# Patient Record
Sex: MALE | Race: WHITE | NOT HISPANIC OR LATINO | Employment: OTHER | URBAN - METROPOLITAN AREA
[De-identification: names, ages, dates, MRNs, and addresses within clinical notes are randomized per-mention and may not be internally consistent; named-entity substitution may affect disease eponyms.]

---

## 2018-01-17 ENCOUNTER — OFFICE VISIT (OUTPATIENT)
Dept: FAMILY MEDICINE CLINIC | Facility: CLINIC | Age: 83
End: 2018-01-17

## 2018-01-17 VITALS
RESPIRATION RATE: 14 BRPM | OXYGEN SATURATION: 99 % | HEART RATE: 78 BPM | TEMPERATURE: 98.3 F | WEIGHT: 168.2 LBS | SYSTOLIC BLOOD PRESSURE: 130 MMHG | BODY MASS INDEX: 25.49 KG/M2 | HEIGHT: 68 IN | DIASTOLIC BLOOD PRESSURE: 78 MMHG

## 2018-01-17 DIAGNOSIS — I48.21 PERMANENT ATRIAL FIBRILLATION (HCC): Primary | ICD-10-CM

## 2018-01-17 DIAGNOSIS — F51.01 PRIMARY INSOMNIA: ICD-10-CM

## 2018-01-17 DIAGNOSIS — I10 HYPERTENSION, ESSENTIAL: ICD-10-CM

## 2018-01-17 DIAGNOSIS — E78.01 FAMILIAL HYPERCHOLESTEROLEMIA: ICD-10-CM

## 2018-01-17 PROCEDURE — 99204 OFFICE O/P NEW MOD 45 MIN: CPT | Performed by: FAMILY MEDICINE

## 2018-01-17 RX ORDER — ATORVASTATIN CALCIUM 20 MG/1
20 TABLET, FILM COATED ORAL DAILY
COMMUNITY
End: 2018-05-17 | Stop reason: SDUPTHER

## 2018-01-17 RX ORDER — RAMIPRIL 10 MG/1
10 CAPSULE ORAL DAILY
COMMUNITY
End: 2018-03-02 | Stop reason: SDUPTHER

## 2018-01-17 RX ORDER — CELECOXIB 200 MG/1
200 CAPSULE ORAL DAILY
COMMUNITY
End: 2018-03-14 | Stop reason: SDUPTHER

## 2018-01-17 RX ORDER — ZOLPIDEM TARTRATE 5 MG/1
2.5 TABLET ORAL NIGHTLY PRN
COMMUNITY
End: 2018-05-17 | Stop reason: SDUPTHER

## 2018-03-02 RX ORDER — RAMIPRIL 10 MG/1
10 CAPSULE ORAL DAILY
Qty: 90 CAPSULE | Refills: 3 | Status: SHIPPED | OUTPATIENT
Start: 2018-03-02 | End: 2019-02-26 | Stop reason: SDUPTHER

## 2018-03-13 ENCOUNTER — TELEPHONE (OUTPATIENT)
Dept: FAMILY MEDICINE CLINIC | Facility: CLINIC | Age: 83
End: 2018-03-13

## 2018-03-13 NOTE — TELEPHONE ENCOUNTER
Patient needs a refill on Celecoxib 200mg take one cap by mouth daily sent to [facility] at [facility].

## 2018-03-14 RX ORDER — CELECOXIB 200 MG/1
200 CAPSULE ORAL DAILY
Qty: 90 CAPSULE | Refills: 3 | Status: SHIPPED | OUTPATIENT
Start: 2018-03-14 | End: 2019-03-07 | Stop reason: SDUPTHER

## 2018-05-16 NOTE — TELEPHONE ENCOUNTER
Patient needs a RX for Atorvastatin 40mg take one tablet by mouth daily sent to [facility] in [place]     Also patient needs a RX for Zolpidem 5g take one tablet by mouth at bedtime sent to [facility] pharmacy.

## 2018-05-17 RX ORDER — ATORVASTATIN CALCIUM 20 MG/1
20 TABLET, FILM COATED ORAL DAILY
Qty: 90 TABLET | Refills: 3 | Status: SHIPPED | OUTPATIENT
Start: 2018-05-17 | End: 2019-05-20 | Stop reason: SDUPTHER

## 2018-05-17 RX ORDER — ZOLPIDEM TARTRATE 5 MG/1
5 TABLET ORAL NIGHTLY PRN
Qty: 30 TABLET | Refills: 0 | OUTPATIENT
Start: 2018-05-17 | End: 2018-07-10 | Stop reason: SDUPTHER

## 2018-07-06 RX ORDER — ZOLPIDEM TARTRATE 5 MG/1
TABLET ORAL
Qty: 30 TABLET | Refills: 0 | OUTPATIENT
Start: 2018-07-06

## 2018-07-10 ENCOUNTER — OFFICE VISIT (OUTPATIENT)
Dept: FAMILY MEDICINE CLINIC | Facility: CLINIC | Age: 83
End: 2018-07-10

## 2018-07-10 VITALS
DIASTOLIC BLOOD PRESSURE: 70 MMHG | HEIGHT: 68 IN | HEART RATE: 87 BPM | BODY MASS INDEX: 25.76 KG/M2 | SYSTOLIC BLOOD PRESSURE: 122 MMHG | RESPIRATION RATE: 14 BRPM | WEIGHT: 170 LBS | TEMPERATURE: 98.3 F | OXYGEN SATURATION: 98 %

## 2018-07-10 DIAGNOSIS — R53.83 FATIGUE, UNSPECIFIED TYPE: ICD-10-CM

## 2018-07-10 DIAGNOSIS — F51.01 PRIMARY INSOMNIA: Primary | ICD-10-CM

## 2018-07-10 PROCEDURE — 99214 OFFICE O/P EST MOD 30 MIN: CPT | Performed by: FAMILY MEDICINE

## 2018-07-10 RX ORDER — ZOLPIDEM TARTRATE 5 MG/1
5 TABLET ORAL NIGHTLY PRN
Qty: 90 TABLET | Refills: 0 | Status: SHIPPED | OUTPATIENT
Start: 2018-07-10 | End: 2018-12-27 | Stop reason: SDUPTHER

## 2018-07-10 NOTE — PROGRESS NOTES
Jasson Gonzalez is a 83 y.o. male.     Chief Complaint   Patient presents with   • Insomnia     Patient needs a refill on ambien for insomnia.        HPI     Patient presents office today to follow-up on insomnia as well as to discuss problem that is new to me.  Patient states that he's been taking and tolerating his Ambien 5 mg tablets with no issues.  Tolerating well.  No adverse side effects noted.  Patient does report that over the last 6 months or so he's felt like he is becoming more and more fatigued with physical activity.  He denies any chest pain or shortness of breath.  No lightheadedness or dizziness.  Patient states when he stands for long periods of time he feels like he gets weak.  States that he gets weakness in his low back in his hips and he has to sit down.  No known injury.    The following portions of the patient's history were reviewed and updated as appropriate: allergies, current medications, past family history, past medical history, past social history, past surgical history and problem list.    Review of Systems   Psychiatric/Behavioral: Positive for sleep disturbance.   All other systems reviewed and are negative.      Objective  Vitals:    07/10/18 0912   BP: 122/70   Pulse: 87   Resp: 14   Temp: 98.3 °F (36.8 °C)   SpO2: 98%       Physical Exam   Constitutional: He is oriented to person, place, and time. He appears well-developed and well-nourished. No distress.   HENT:   Head: Normocephalic and atraumatic.   Right Ear: External ear normal.   Left Ear: External ear normal.   Nose: Nose normal.   Mouth/Throat: Oropharynx is clear and moist.   Eyes: Conjunctivae and EOM are normal. Pupils are equal, round, and reactive to light. Right eye exhibits no discharge. Left eye exhibits no discharge. No scleral icterus.   Cardiovascular: Normal rate, regular rhythm and normal heart sounds.    Pulmonary/Chest: Effort normal and breath sounds normal. No respiratory distress. He has no  wheezes. He has no rales.   Abdominal: Soft. Bowel sounds are normal. He exhibits no distension. There is no tenderness.   Neurological: He is alert and oriented to person, place, and time.   Skin: Skin is warm and dry. He is not diaphoretic.   Nursing note and vitals reviewed.        Current Outpatient Prescriptions:   •  apixaban (ELIQUIS) 5 MG tablet tablet, Take 1 tablet by mouth Every 12 (Twelve) Hours., Disp: 180 tablet, Rfl: 3  •  celecoxib (CeleBREX) 200 MG capsule, Take 1 capsule by mouth Daily., Disp: 90 capsule, Rfl: 3  •  ramipril (ALTACE) 10 MG capsule, Take 1 capsule by mouth Daily., Disp: 90 capsule, Rfl: 3  •  zolpidem (AMBIEN) 5 MG tablet, Take 1 tablet by mouth At Night As Needed for Sleep., Disp: 90 tablet, Rfl: 0  •  atorvastatin (LIPITOR) 20 MG tablet, Take 1 tablet by mouth Daily., Disp: 90 tablet, Rfl: 3    Procedures    Lab Results (most recent)     None              Jasson was seen today for insomnia.    Diagnoses and all orders for this visit:    Primary insomnia  -     zolpidem (AMBIEN) 5 MG tablet; Take 1 tablet by mouth At Night As Needed for Sleep.    Fatigue, unspecified type  -     CBC Auto Differential  -     Comprehensive Metabolic Panel  -     Thyroid Panel With TSH      3 month supply of Ambien given to the patient.  We'll get blood work as above to evaluate the underlying cause of the patient's fatigue.  Discussed the need to try and improve conditioning with physical exercise.  Patient voiced understanding and will in increase the amount of time he spends on a stationary bike.    Return in about 3 months (around 10/10/2018) for Recheck.      Benjamin Yuen MD

## 2018-07-11 LAB
ALBUMIN SERPL-MCNC: 4.5 G/DL (ref 3.5–5.2)
ALBUMIN/GLOB SERPL: 2.3 G/DL
ALP SERPL-CCNC: 70 U/L (ref 39–117)
ALT SERPL-CCNC: 24 U/L (ref 1–41)
AST SERPL-CCNC: 23 U/L (ref 1–40)
BASOPHILS # BLD AUTO: 0.03 10*3/MM3 (ref 0–0.2)
BASOPHILS NFR BLD AUTO: 0.4 % (ref 0–1.5)
BILIRUB SERPL-MCNC: 0.6 MG/DL (ref 0.1–1.2)
BUN SERPL-MCNC: 22 MG/DL (ref 8–23)
BUN/CREAT SERPL: 18.3 (ref 7–25)
CALCIUM SERPL-MCNC: 9.4 MG/DL (ref 8.6–10.5)
CHLORIDE SERPL-SCNC: 103 MMOL/L (ref 98–107)
CO2 SERPL-SCNC: 22.7 MMOL/L (ref 22–29)
CREAT SERPL-MCNC: 1.2 MG/DL (ref 0.76–1.27)
EOSINOPHIL # BLD AUTO: 0.3 10*3/MM3 (ref 0–0.7)
EOSINOPHIL NFR BLD AUTO: 3.7 % (ref 0.3–6.2)
ERYTHROCYTE [DISTWIDTH] IN BLOOD BY AUTOMATED COUNT: 13.6 % (ref 11.5–14.5)
FT4I SERPL CALC-MCNC: 1.9 (ref 1.2–4.9)
GLOBULIN SER CALC-MCNC: 2 GM/DL
GLUCOSE SERPL-MCNC: 119 MG/DL (ref 65–99)
HCT VFR BLD AUTO: 43.1 % (ref 40.4–52.2)
HGB BLD-MCNC: 14.4 G/DL (ref 13.7–17.6)
IMM GRANULOCYTES # BLD: 0.02 10*3/MM3 (ref 0–0.03)
IMM GRANULOCYTES NFR BLD: 0.2 % (ref 0–0.5)
LYMPHOCYTES # BLD AUTO: 1.32 10*3/MM3 (ref 0.9–4.8)
LYMPHOCYTES NFR BLD AUTO: 16.5 % (ref 19.6–45.3)
MCH RBC QN AUTO: 31.8 PG (ref 27–32.7)
MCHC RBC AUTO-ENTMCNC: 33.4 G/DL (ref 32.6–36.4)
MCV RBC AUTO: 95.1 FL (ref 79.8–96.2)
MONOCYTES # BLD AUTO: 0.65 10*3/MM3 (ref 0.2–1.2)
MONOCYTES NFR BLD AUTO: 8.1 % (ref 5–12)
NEUTROPHILS # BLD AUTO: 5.72 10*3/MM3 (ref 1.9–8.1)
NEUTROPHILS NFR BLD AUTO: 71.3 % (ref 42.7–76)
PLATELET # BLD AUTO: 191 10*3/MM3 (ref 140–500)
POTASSIUM SERPL-SCNC: 4.6 MMOL/L (ref 3.5–5.2)
PROT SERPL-MCNC: 6.5 G/DL (ref 6–8.5)
RBC # BLD AUTO: 4.53 10*6/MM3 (ref 4.6–6)
SODIUM SERPL-SCNC: 142 MMOL/L (ref 136–145)
T3RU NFR SERPL: 28 % (ref 24–39)
T4 SERPL-MCNC: 6.8 UG/DL (ref 4.5–12)
TSH SERPL DL<=0.005 MIU/L-ACNC: 3.2 UIU/ML (ref 0.45–4.5)
WBC # BLD AUTO: 8.02 10*3/MM3 (ref 4.5–10.7)

## 2018-12-21 DIAGNOSIS — F51.01 PRIMARY INSOMNIA: ICD-10-CM

## 2018-12-21 RX ORDER — ZOLPIDEM TARTRATE 5 MG/1
TABLET ORAL
Qty: 90 TABLET | Refills: 0 | OUTPATIENT
Start: 2018-12-21

## 2018-12-26 ENCOUNTER — TELEPHONE (OUTPATIENT)
Dept: FAMILY MEDICINE CLINIC | Facility: CLINIC | Age: 83
End: 2018-12-26

## 2018-12-26 DIAGNOSIS — F51.01 PRIMARY INSOMNIA: ICD-10-CM

## 2018-12-26 NOTE — TELEPHONE ENCOUNTER
Pt is requesting refill for zolpidem (AMBIEN) 5 MG He made appt for 1st available on 1/10  [facility]

## 2018-12-27 RX ORDER — ZOLPIDEM TARTRATE 5 MG/1
5 TABLET ORAL NIGHTLY PRN
Qty: 90 TABLET | Refills: 0 | Status: SHIPPED | OUTPATIENT
Start: 2018-12-27 | End: 2019-06-18 | Stop reason: SDUPTHER

## 2019-01-10 ENCOUNTER — OFFICE VISIT (OUTPATIENT)
Dept: FAMILY MEDICINE CLINIC | Facility: CLINIC | Age: 84
End: 2019-01-10

## 2019-01-10 VITALS
TEMPERATURE: 98.3 F | BODY MASS INDEX: 25.91 KG/M2 | WEIGHT: 171 LBS | SYSTOLIC BLOOD PRESSURE: 132 MMHG | DIASTOLIC BLOOD PRESSURE: 78 MMHG | HEART RATE: 78 BPM | RESPIRATION RATE: 14 BRPM | HEIGHT: 68 IN | OXYGEN SATURATION: 98 %

## 2019-01-10 DIAGNOSIS — R29.898 WEAKNESS OF BOTH LOWER EXTREMITIES: Primary | ICD-10-CM

## 2019-01-10 DIAGNOSIS — F51.01 PRIMARY INSOMNIA: ICD-10-CM

## 2019-01-10 PROCEDURE — 99214 OFFICE O/P EST MOD 30 MIN: CPT | Performed by: FAMILY MEDICINE

## 2019-01-10 NOTE — PROGRESS NOTES
Jasson Gonzalez is a 83 y.o. male.     Chief Complaint   Patient presents with   • Insomnia     Patient needs refill on ambien.   • Extremity Weakness     Patient is having hard time staying standing for more than 5-6 mints.       HPI     Patient presents to the office today to discuss a couple of issues.  Patient suffers from insomnia.  Currently taking and tolerating Ambien 5 mg nightly with no adverse side effects.  Patient ran out of this medication was given a refill contingent upon the visit today.  No adverse side effects noted.    Patient states that for the last 6-8 months he's noticed a steady decline in the strength that he has in his bilateral lower extremities.  He states that he's able to walk okay without issue but if he is asked to stand still he feels like his hips get tired and they aren't going to give out.  He just sits down.  States that her last about 5 or 6 minutes standing before he has to sit down.  No known injury.  No numbness or tingling.    The following portions of the patient's history were reviewed and updated as appropriate: allergies, current medications, past family history, past medical history, past social history, past surgical history and problem list.    Review of Systems   Musculoskeletal: Positive for gait problem and joint swelling.   All other systems reviewed and are negative.      Objective  Vitals:    01/10/19 1530   BP: 132/78   Pulse: 78   Resp: 14   Temp: 98.3 °F (36.8 °C)   SpO2: 98%       Physical Exam   Constitutional: He is oriented to person, place, and time. He appears well-developed and well-nourished. No distress.   HENT:   Head: Normocephalic and atraumatic.   Right Ear: External ear normal.   Left Ear: External ear normal.   Nose: Nose normal.   Mouth/Throat: Oropharynx is clear and moist.   Eyes: Conjunctivae and EOM are normal. Pupils are equal, round, and reactive to light. Right eye exhibits no discharge. Left eye exhibits no discharge. No  scleral icterus.   Cardiovascular: Normal rate, regular rhythm and normal heart sounds.   Pulmonary/Chest: Effort normal and breath sounds normal. No respiratory distress. He has no wheezes. He has no rales.   Abdominal: Soft. Bowel sounds are normal. He exhibits no distension. There is no tenderness.   Neurological: He is alert and oriented to person, place, and time.   Skin: Skin is warm and dry. He is not diaphoretic.   Nursing note and vitals reviewed.        Current Outpatient Medications:   •  apixaban (ELIQUIS) 5 MG tablet tablet, Take 1 tablet by mouth Every 12 (Twelve) Hours., Disp: 180 tablet, Rfl: 3  •  atorvastatin (LIPITOR) 20 MG tablet, Take 1 tablet by mouth Daily., Disp: 90 tablet, Rfl: 3  •  celecoxib (CeleBREX) 200 MG capsule, Take 1 capsule by mouth Daily., Disp: 90 capsule, Rfl: 3  •  ramipril (ALTACE) 10 MG capsule, Take 1 capsule by mouth Daily., Disp: 90 capsule, Rfl: 3  •  zolpidem (AMBIEN) 5 MG tablet, Take 1 tablet by mouth At Night As Needed for Sleep., Disp: 90 tablet, Rfl: 0  Current outpatient and discharge medications have been reconciled for the patient.  Reviewed by: Benjamin Yuen MD      Procedures    Lab Results (most recent)     None                  Jasson was seen today for insomnia and extremity weakness.    Diagnoses and all orders for this visit:    Weakness of both lower extremities    Primary insomnia    Refill given previously for Ambien 5 mg.  Discussed the continued use as well as the potential adverse side effects.    Physical exam somewhat reassuring of bilateral lower extremities.  We'll try physical conditioning with a stationary bike that he has at his home 10 minutes a day for one month.  If no improvement will consider physical therapy or further investigation through imaging.      Return in about 4 weeks (around 2/7/2019) for Recheck.      Benjamin Yuen MD

## 2019-01-19 DIAGNOSIS — I48.21 PERMANENT ATRIAL FIBRILLATION (HCC): ICD-10-CM

## 2019-01-21 RX ORDER — APIXABAN 5 MG/1
TABLET, FILM COATED ORAL
Qty: 180 TABLET | Refills: 2 | Status: SHIPPED | OUTPATIENT
Start: 2019-01-21 | End: 2019-11-11 | Stop reason: SDUPTHER

## 2019-02-26 RX ORDER — RAMIPRIL 10 MG/1
CAPSULE ORAL
Qty: 90 CAPSULE | Refills: 2 | Status: SHIPPED | OUTPATIENT
Start: 2019-02-26 | End: 2019-12-10 | Stop reason: SDUPTHER

## 2019-03-07 RX ORDER — CELECOXIB 200 MG/1
CAPSULE ORAL
Qty: 90 CAPSULE | Refills: 2 | Status: SHIPPED | OUTPATIENT
Start: 2019-03-07 | End: 2019-12-12 | Stop reason: SDUPTHER

## 2019-04-25 ENCOUNTER — OFFICE VISIT (OUTPATIENT)
Dept: FAMILY MEDICINE CLINIC | Facility: CLINIC | Age: 84
End: 2019-04-25

## 2019-04-25 VITALS
WEIGHT: 164 LBS | HEIGHT: 68 IN | OXYGEN SATURATION: 98 % | TEMPERATURE: 98.8 F | HEART RATE: 75 BPM | RESPIRATION RATE: 14 BRPM | DIASTOLIC BLOOD PRESSURE: 66 MMHG | SYSTOLIC BLOOD PRESSURE: 128 MMHG | BODY MASS INDEX: 24.86 KG/M2

## 2019-04-25 DIAGNOSIS — T14.8XXA OPEN WOUND: ICD-10-CM

## 2019-04-25 DIAGNOSIS — L03.114 CELLULITIS OF LEFT UPPER EXTREMITY: Primary | ICD-10-CM

## 2019-04-25 PROCEDURE — 99213 OFFICE O/P EST LOW 20 MIN: CPT | Performed by: NURSE PRACTITIONER

## 2019-04-25 RX ORDER — CEPHALEXIN 500 MG/1
500 CAPSULE ORAL 3 TIMES DAILY
Qty: 21 CAPSULE | Refills: 0 | Status: SHIPPED | OUTPATIENT
Start: 2019-04-25 | End: 2019-09-19

## 2019-04-25 NOTE — PROGRESS NOTES
"Subjective   Jasson Gonzalez is a 84 y.o. male.     History of Present Illness   Patient presented to the office today with concerns over a wound on his left elbow from working on his tractor over the weekend.  The area is red and swollen he has not been using any medication on it.  The following portions of the patient's history were reviewed and updated as appropriate: allergies, current medications, past social history and problem list.    Review of Systems   Skin: Positive for wound.   All other systems reviewed and are negative.      Objective   /66   Pulse 75   Temp 98.8 °F (37.1 °C) (Oral)   Resp 14   Ht 172.7 cm (68\")   Wt 74.4 kg (164 lb)   SpO2 98%   BMI 24.94 kg/m²   Physical Exam   Constitutional: He is oriented to person, place, and time. Vital signs are normal. He appears well-developed and well-nourished. No distress.   HENT:   Head: Normocephalic.   Cardiovascular: Normal rate, regular rhythm and normal heart sounds.   Pulmonary/Chest: Effort normal and breath sounds normal.   Neurological: He is alert and oriented to person, place, and time. Gait normal.   Skin:   Left elbow swollen with a bursitis-like fluid there is an open wound on the very tip of his elbow with slight scabbing area is red and warm to the touch   Psychiatric: He has a normal mood and affect. His behavior is normal. Judgment and thought content normal.   Vitals reviewed.      Assessment/Plan      Diagnosis Plan   1. Cellulitis of left upper extremity  cephalexin (KEFLEX) 500 MG capsule   2. Open wound  cephalexin (KEFLEX) 500 MG capsule     Take antibiotic as directed if you get a fever or gets worse with antibiotic return to the office if there is no improvement with antibiotic return to the office ice the area for the swelling  Nir Pino, KIRSTEN  4/25/2019  "

## 2019-05-20 RX ORDER — ATORVASTATIN CALCIUM 20 MG/1
TABLET, FILM COATED ORAL
Qty: 90 TABLET | Refills: 2 | Status: SHIPPED | OUTPATIENT
Start: 2019-05-20 | End: 2020-02-19

## 2019-06-17 DIAGNOSIS — F51.01 PRIMARY INSOMNIA: ICD-10-CM

## 2019-06-17 RX ORDER — ZOLPIDEM TARTRATE 5 MG/1
TABLET ORAL
Qty: 90 TABLET | Refills: 0 | OUTPATIENT
Start: 2019-06-17

## 2019-06-18 DIAGNOSIS — F51.01 PRIMARY INSOMNIA: ICD-10-CM

## 2019-06-18 RX ORDER — ZOLPIDEM TARTRATE 5 MG/1
5 TABLET ORAL NIGHTLY PRN
Qty: 10 TABLET | Refills: 0 | Status: SHIPPED | OUTPATIENT
Start: 2019-06-18 | End: 2019-06-25 | Stop reason: SDUPTHER

## 2019-06-18 NOTE — TELEPHONE ENCOUNTER
Pt made an Ambien review appointment for next Tuesday 6/25. Pt is curious if a short supply can be sent to the [facility] until he is evaluated?

## 2019-06-25 ENCOUNTER — OFFICE VISIT (OUTPATIENT)
Dept: FAMILY MEDICINE CLINIC | Facility: CLINIC | Age: 84
End: 2019-06-25

## 2019-06-25 VITALS
OXYGEN SATURATION: 99 % | BODY MASS INDEX: 24.89 KG/M2 | HEART RATE: 93 BPM | TEMPERATURE: 98 F | HEIGHT: 68 IN | DIASTOLIC BLOOD PRESSURE: 66 MMHG | SYSTOLIC BLOOD PRESSURE: 118 MMHG | WEIGHT: 164.2 LBS

## 2019-06-25 DIAGNOSIS — F51.01 PRIMARY INSOMNIA: ICD-10-CM

## 2019-06-25 PROCEDURE — 99214 OFFICE O/P EST MOD 30 MIN: CPT | Performed by: FAMILY MEDICINE

## 2019-06-25 RX ORDER — ZOLPIDEM TARTRATE 5 MG/1
5 TABLET ORAL NIGHTLY PRN
Qty: 90 TABLET | Refills: 0 | Status: SHIPPED | OUTPATIENT
Start: 2019-06-25 | End: 2020-01-16

## 2019-06-25 NOTE — PROGRESS NOTES
Jasson Gonzalez is a 84 y.o. male.     Chief Complaint   Patient presents with   • Insomnia     pt here to follow up on ambien       HPI     Patient presents the office today for follow-up insomnia.  Patient takes and tolerating Ambien 5 mg on a as needed basis.  No adverse side effects noted.  Controls his symptoms of insomnia very well.    The following portions of the patient's history were reviewed and updated as appropriate: allergies, current medications, past family history, past medical history, past social history, past surgical history and problem list.    Review of Systems   Cardiovascular: Negative for chest pain, palpitations and leg swelling.   Psychiatric/Behavioral: Positive for sleep disturbance.   All other systems reviewed and are negative.      Objective  Vitals:    06/25/19 1316   BP: 118/66   Pulse: 93   Temp: 98 °F (36.7 °C)   SpO2: 99%       Physical Exam   Constitutional: He is oriented to person, place, and time. He appears well-developed and well-nourished. No distress.   HENT:   Head: Normocephalic and atraumatic.   Right Ear: External ear normal.   Left Ear: External ear normal.   Nose: Nose normal.   Mouth/Throat: Oropharynx is clear and moist.   Eyes: Conjunctivae and EOM are normal. Pupils are equal, round, and reactive to light. Right eye exhibits no discharge. Left eye exhibits no discharge. No scleral icterus.   Cardiovascular: Normal rate, regular rhythm and normal heart sounds.   Pulmonary/Chest: Effort normal and breath sounds normal. No respiratory distress. He has no wheezes. He has no rales.   Abdominal: Soft. Bowel sounds are normal. He exhibits no distension. There is no tenderness.   Neurological: He is alert and oriented to person, place, and time.   Skin: Skin is warm and dry. He is not diaphoretic.   Nursing note and vitals reviewed.        Current Outpatient Medications:   •  atorvastatin (LIPITOR) 20 MG tablet, TAKE ONE TABLET BY MOUTH DAILY, Disp: 90 tablet,  Rfl: 2  •  celecoxib (CeleBREX) 200 MG capsule, TAKE ONE CAPSULE BY MOUTH DAILY, Disp: 90 capsule, Rfl: 2  •  cephalexin (KEFLEX) 500 MG capsule, Take 1 capsule by mouth 3 (Three) Times a Day., Disp: 21 capsule, Rfl: 0  •  ELIQUIS 5 MG tablet tablet, TAKE ONE TABLET BY MOUTH EVERY 12 HOURS, Disp: 180 tablet, Rfl: 2  •  ramipril (ALTACE) 10 MG capsule, TAKE ONE CAPSULE BY MOUTH DAILY, Disp: 90 capsule, Rfl: 2  •  zolpidem (AMBIEN) 5 MG tablet, Take 1 tablet by mouth At Night As Needed for Sleep., Disp: 90 tablet, Rfl: 0  Current outpatient and discharge medications have been reconciled for the patient.  Reviewed by: Benjamin Yuen MD      Procedures    Lab Results (most recent)     None                  Jasson was seen today for insomnia.    Diagnoses and all orders for this visit:    Primary insomnia  -     zolpidem (AMBIEN) 5 MG tablet; Take 1 tablet by mouth At Night As Needed for Sleep.    90-day supply given as above.      Return in about 3 months (around 9/25/2019) for Recheck.      Benjamin Yuen MD

## 2019-09-19 ENCOUNTER — OFFICE VISIT (OUTPATIENT)
Dept: FAMILY MEDICINE CLINIC | Facility: CLINIC | Age: 84
End: 2019-09-19

## 2019-09-19 VITALS
SYSTOLIC BLOOD PRESSURE: 126 MMHG | TEMPERATURE: 98.3 F | OXYGEN SATURATION: 98 % | RESPIRATION RATE: 14 BRPM | DIASTOLIC BLOOD PRESSURE: 66 MMHG | HEIGHT: 68 IN | HEART RATE: 63 BPM | BODY MASS INDEX: 24.71 KG/M2 | WEIGHT: 163 LBS

## 2019-09-19 DIAGNOSIS — Z00.00 MEDICARE ANNUAL WELLNESS VISIT, SUBSEQUENT: Primary | ICD-10-CM

## 2019-09-19 PROCEDURE — G0439 PPPS, SUBSEQ VISIT: HCPCS | Performed by: FAMILY MEDICINE

## 2019-09-19 NOTE — PATIENT INSTRUCTIONS
Medicare Wellness  Personal Prevention Plan of Service     Date of Office Visit:  2019  Encounter Provider:  Benjamin Yuen MD  Place of Service:  [facility]  Patient Name: Jasson Gonzalez  :  1935    As part of the Medicare Wellness portion of your visit today, we are providing you with this personalized preventive plan of services (PPPS). This plan is based upon recommendations of the [place] Preventive Services Task Force (USPSTF) and the Advisory Committee on Immunization Practices (ACIP).    This lists the preventive care services that should be considered, and provides dates of when you are due. Items listed as completed are up-to-date and do not require any further intervention.    Health Maintenance   Topic Date Due   • TDAP/TD VACCINES (1 - Tdap) 1955   • PNEUMOCOCCAL VACCINES (65+ LOW/MEDIUM RISK) (1 of 2 - PCV13) 2001   • LIPID PANEL  2018   • INFLUENZA VACCINE  2019   • MEDICARE ANNUAL WELLNESS  2020   • ZOSTER VACCINE  Completed       No orders of the defined types were placed in this encounter.      Return for As needed.

## 2019-09-19 NOTE — PROGRESS NOTES
The ABCs of the Annual Wellness Visit  Subsequent Medicare Wellness Visit    Chief Complaint   Patient presents with   • Medicare Wellness-subsequent       Subjective   History of Present Illness:  Jasson Gonzalez is a 84 y.o. male who presents for a Subsequent Medicare Wellness Visit.    HEALTH RISK ASSESSMENT    Recent Hospitalizations:  No hospitalization(s) within the last year.    Current Medical Providers:  Patient Care Team:  Benjamin Yuen MD as PCP - General (Family Medicine)  Benjamin Yuen MD as PCP - Claims Attributed    Smoking Status:  Social History     Tobacco Use   Smoking Status Never Smoker       Alcohol Consumption:  Social History     Substance and Sexual Activity   Alcohol Use No       Depression Screen:   PHQ-2/PHQ-9 Depression Screening 9/19/2019   Little interest or pleasure in doing things 0   Feeling down, depressed, or hopeless 0   Trouble falling or staying asleep, or sleeping too much 0   Feeling tired or having little energy 0   Poor appetite or overeating 0   Feeling bad about yourself - or that you are a failure or have let yourself or your family down 0   Trouble concentrating on things, such as reading the newspaper or watching television 0   Moving or speaking so slowly that other people could have noticed. Or the opposite - being so fidgety or restless that you have been moving around a lot more than usual 0   Thoughts that you would be better off dead, or of hurting yourself in some way 0   Total Score 0   If you checked off any problems, how difficult have these problems made it for you to do your work, take care of things at home, or get along with other people? Not difficult at all       Fall Risk Screen:  STEADI Fall Risk Assessment was completed, and patient is at LOW risk for falls.Assessment completed on:9/19/2019    Health Habits and Functional and Cognitive Screening:  Functional & Cognitive Status 9/19/2019   Do you have difficulty preparing food and eating? No    Do you have difficulty bathing yourself, getting dressed or grooming yourself? No   Do you have difficulty using the toilet? No   Do you have difficulty moving around from place to place? No   Do you have trouble with steps or getting out of a bed or a chair? No   Current Diet Well Balanced Diet   Dental Exam Up to date   Eye Exam Up to date   Exercise (times per week) 4 times per week   Do you need help using the phone?  No   Are you deaf or do you have serious difficulty hearing?  No   Do you need help with transportation? No   Do you need help shopping? No   Do you need help preparing meals?  No   Do you need help with housework?  No   Do you need help with laundry? No   Do you need help taking your medications? No   Do you need help managing money? No   Do you ever drive or ride in a car without wearing a seat belt? No   Have you felt unusual stress, anger or loneliness in the last month? No   Who do you live with? Spouse   If you need help, do you have trouble finding someone available to you? No   Have you been bothered in the last four weeks by sexual problems? No   Do you have difficulty concentrating, remembering or making decisions? No         Does the patient have evidence of cognitive impairment? No    Asprin use counseling:Does not need ASA (and currently is not on it)    Age-appropriate Screening Schedule:  Refer to the list below for future screening recommendations based on patient's age, sex and/or medical conditions. Orders for these recommended tests are listed in the plan section. The patient has been provided with a written plan.    Health Maintenance   Topic Date Due   • TDAP/TD VACCINES (1 - Tdap) 03/28/1955   • PNEUMOCOCCAL VACCINES (65+ LOW/MEDIUM RISK) (1 of 2 - PCV13) 03/28/2001   • LIPID PANEL  01/17/2018   • INFLUENZA VACCINE  08/01/2019   • ZOSTER VACCINE  Completed          The following portions of the patient's history were reviewed and updated as appropriate: allergies, current  "medications, past family history, past medical history, past social history, past surgical history and problem list.    Outpatient Medications Prior to Visit   Medication Sig Dispense Refill   • atorvastatin (LIPITOR) 20 MG tablet TAKE ONE TABLET BY MOUTH DAILY 90 tablet 2   • celecoxib (CeleBREX) 200 MG capsule TAKE ONE CAPSULE BY MOUTH DAILY 90 capsule 2   • ELIQUIS 5 MG tablet tablet TAKE ONE TABLET BY MOUTH EVERY 12 HOURS 180 tablet 2   • ramipril (ALTACE) 10 MG capsule TAKE ONE CAPSULE BY MOUTH DAILY 90 capsule 2   • zolpidem (AMBIEN) 5 MG tablet Take 1 tablet by mouth At Night As Needed for Sleep. 90 tablet 0   • cephalexin (KEFLEX) 500 MG capsule Take 1 capsule by mouth 3 (Three) Times a Day. 21 capsule 0     No facility-administered medications prior to visit.        Patient Active Problem List   Diagnosis   • Cellulitis of left upper extremity   • Open wound       Advanced Care Planning:  Patient has an advance directive - a copy has been provided and is visible in patient header    Review of Systems   Constitutional: Negative for activity change.   HENT: Negative for congestion.    Eyes: Negative for discharge.   Respiratory: Negative for apnea.    Cardiovascular: Negative for chest pain.   Gastrointestinal: Negative for abdominal distention.   Musculoskeletal: Negative for arthralgias and back pain.   All other systems reviewed and are negative.      Compared to one year ago, the patient feels his physical health is the same.  Compared to one year ago, the patient feels his mental health is the same.    Reviewed chart for potential of high risk medication in the elderly: yes  Reviewed chart for potential of harmful drug interactions in the elderly:yes    Objective         Vitals:    09/19/19 1009   BP: 126/66   Pulse: 63   Resp: 14   Temp: 98.3 °F (36.8 °C)   TempSrc: Oral   SpO2: 98%   Weight: 73.9 kg (163 lb)   Height: 172.7 cm (68\")   PainSc: 0-No pain       Body mass index is 24.78 kg/m².  Discussed " the patient's BMI with him. The BMI is in the acceptable range.    Physical Exam          Assessment/Plan   Medicare Risks and Personalized Health Plan  CMS Preventative Services Quick Reference  Depression/Dysphoria  Immunizations Discussed/Encouraged (specific immunizations; Gets vaccines from pharmacy. )    The above risks/problems have been discussed with the patient.  Pertinent information has been shared with the patient in the After Visit Summary.  Follow up plans and orders are seen below in the Assessment/Plan Section.    Diagnoses and all orders for this visit:    1. Medicare annual wellness visit, subsequent (Primary)      Follow Up:  Return for As needed.     An After Visit Summary and PPPS were given to the patient.

## 2019-11-11 DIAGNOSIS — I48.21 PERMANENT ATRIAL FIBRILLATION (HCC): ICD-10-CM

## 2019-11-11 RX ORDER — APIXABAN 5 MG/1
TABLET, FILM COATED ORAL
Qty: 180 TABLET | Refills: 1 | Status: SHIPPED | OUTPATIENT
Start: 2019-11-11 | End: 2020-06-04

## 2019-12-10 RX ORDER — RAMIPRIL 10 MG/1
10 CAPSULE ORAL DAILY
Qty: 90 CAPSULE | Refills: 3 | Status: SHIPPED | OUTPATIENT
Start: 2019-12-10 | End: 2020-07-13 | Stop reason: SDUPTHER

## 2019-12-12 RX ORDER — CELECOXIB 200 MG/1
CAPSULE ORAL
Qty: 90 CAPSULE | Refills: 1 | Status: SHIPPED | OUTPATIENT
Start: 2019-12-12 | End: 2020-06-23

## 2020-01-13 DIAGNOSIS — F51.01 PRIMARY INSOMNIA: ICD-10-CM

## 2020-01-16 RX ORDER — ZOLPIDEM TARTRATE 5 MG/1
TABLET ORAL
Qty: 90 TABLET | Refills: 0 | Status: SHIPPED | OUTPATIENT
Start: 2020-01-16 | End: 2020-07-08

## 2020-02-19 RX ORDER — ATORVASTATIN CALCIUM 20 MG/1
TABLET, FILM COATED ORAL
Qty: 90 TABLET | Refills: 1 | Status: SHIPPED | OUTPATIENT
Start: 2020-02-19 | End: 2020-07-13 | Stop reason: SDUPTHER

## 2020-06-04 DIAGNOSIS — I48.21 PERMANENT ATRIAL FIBRILLATION (HCC): ICD-10-CM

## 2020-06-04 RX ORDER — APIXABAN 5 MG/1
TABLET, FILM COATED ORAL
Qty: 180 TABLET | Refills: 0 | Status: SHIPPED | OUTPATIENT
Start: 2020-06-04 | End: 2020-07-13 | Stop reason: SDUPTHER

## 2020-06-23 RX ORDER — CELECOXIB 200 MG/1
CAPSULE ORAL
Qty: 90 CAPSULE | Refills: 0 | Status: SHIPPED | OUTPATIENT
Start: 2020-06-23 | End: 2020-11-16

## 2020-07-06 DIAGNOSIS — F51.01 PRIMARY INSOMNIA: ICD-10-CM

## 2020-07-07 NOTE — TELEPHONE ENCOUNTER
Pt called to check on status of this. I made appt for him as he hasn't been seen since 9/2019. His appt is Mon but states he will be out of medication and needs it sent in before then. Please advise

## 2020-07-08 RX ORDER — ZOLPIDEM TARTRATE 5 MG/1
TABLET ORAL
Qty: 90 TABLET | Refills: 0 | Status: SHIPPED | OUTPATIENT
Start: 2020-07-08 | End: 2020-07-13 | Stop reason: SDUPTHER

## 2020-07-13 ENCOUNTER — OFFICE VISIT (OUTPATIENT)
Dept: FAMILY MEDICINE CLINIC | Facility: CLINIC | Age: 85
End: 2020-07-13

## 2020-07-13 VITALS
WEIGHT: 165 LBS | HEART RATE: 99 BPM | OXYGEN SATURATION: 100 % | TEMPERATURE: 98.2 F | HEIGHT: 68 IN | DIASTOLIC BLOOD PRESSURE: 74 MMHG | BODY MASS INDEX: 25.01 KG/M2 | SYSTOLIC BLOOD PRESSURE: 138 MMHG

## 2020-07-13 DIAGNOSIS — E78.5 HYPERLIPIDEMIA, UNSPECIFIED HYPERLIPIDEMIA TYPE: Primary | ICD-10-CM

## 2020-07-13 DIAGNOSIS — F51.01 PRIMARY INSOMNIA: ICD-10-CM

## 2020-07-13 DIAGNOSIS — I10 HYPERTENSION, ESSENTIAL: ICD-10-CM

## 2020-07-13 DIAGNOSIS — I48.21 PERMANENT ATRIAL FIBRILLATION (HCC): ICD-10-CM

## 2020-07-13 PROCEDURE — 99214 OFFICE O/P EST MOD 30 MIN: CPT | Performed by: FAMILY MEDICINE

## 2020-07-13 RX ORDER — RAMIPRIL 10 MG/1
10 CAPSULE ORAL DAILY
Qty: 90 CAPSULE | Refills: 3 | Status: SHIPPED | OUTPATIENT
Start: 2020-07-13 | End: 2020-11-16 | Stop reason: SDUPTHER

## 2020-07-13 RX ORDER — ATORVASTATIN CALCIUM 20 MG/1
20 TABLET, FILM COATED ORAL DAILY
Qty: 90 TABLET | Refills: 3 | Status: SHIPPED | OUTPATIENT
Start: 2020-07-13 | End: 2020-11-16 | Stop reason: SDUPTHER

## 2020-07-13 RX ORDER — ZOLPIDEM TARTRATE 5 MG/1
5 TABLET ORAL NIGHTLY PRN
Qty: 90 TABLET | Refills: 0 | Status: SHIPPED | OUTPATIENT
Start: 2020-07-13 | End: 2021-03-03 | Stop reason: SDUPTHER

## 2020-07-13 NOTE — PROGRESS NOTES
Jasson Gonzalez is a 85 y.o. male.     Chief Complaint   Patient presents with   • Insomnia     pt is here for ambien review        HPI     Patient presents the office today to discuss a number of issues.  Patient suffers from insomnia, atrial fibrillation, hyperlipidemia, and hypertension.  Currently taking Eliquis, atorvastatin, ramipril, and Ambien.  In need of refills.    The following portions of the patient's history were reviewed and updated as appropriate: allergies, current medications, past family history, past medical history, past social history, past surgical history and problem list.    Review of Systems   Constitutional: Negative.    HENT: Negative.    Eyes: Negative.    Respiratory: Negative.    Cardiovascular: Negative.  Negative for chest pain, palpitations and leg swelling.   Gastrointestinal: Negative.    Endocrine: Negative.    Genitourinary: Negative.    Musculoskeletal: Positive for gait problem.   Skin: Negative.    Allergic/Immunologic: Negative.    Hematological: Negative.    Psychiatric/Behavioral: Positive for sleep disturbance.   All other systems reviewed and are negative.    I have reviewed the ROS as documented by the MA. Benjamin Yuen MD    Objective  Vitals:    07/13/20 1533   BP: 138/74   Pulse: 99   Temp: 98.2 °F (36.8 °C)   SpO2: 100%     Body mass index is 25.09 kg/m².    Physical Exam   Constitutional: He is oriented to person, place, and time. He appears well-developed and well-nourished. No distress.   Neurological: He is alert and oriented to person, place, and time.   Skin: He is not diaphoretic.   Psychiatric: He has a normal mood and affect. His behavior is normal.   Nursing note and vitals reviewed.        Current Outpatient Medications:   •  apixaban (Eliquis) 5 MG tablet tablet, Take 1 tablet by mouth Every 12 (Twelve) Hours., Disp: 180 tablet, Rfl: 3  •  atorvastatin (LIPITOR) 20 MG tablet, Take 1 tablet by mouth Daily., Disp: 90 tablet, Rfl: 3  •  celecoxib  (CeleBREX) 200 MG capsule, TAKE ONE CAPSULE BY MOUTH DAILY, Disp: 90 capsule, Rfl: 0  •  ramipril (ALTACE) 10 MG capsule, Take 1 capsule by mouth Daily., Disp: 90 capsule, Rfl: 3  •  zolpidem (AMBIEN) 5 MG tablet, Take 1 tablet by mouth At Night As Needed for Sleep., Disp: 90 tablet, Rfl: 0  Current outpatient and discharge medications have been reconciled for the patient.  Reviewed by: Benjamin Yuen MD      Procedures    Lab Results (most recent)     None                  Jasson was seen today for insomnia.    Diagnoses and all orders for this visit:    Hyperlipidemia, unspecified hyperlipidemia type  -     atorvastatin (LIPITOR) 20 MG tablet; Take 1 tablet by mouth Daily.    Primary insomnia  -     zolpidem (AMBIEN) 5 MG tablet; Take 1 tablet by mouth At Night As Needed for Sleep.    Permanent atrial fibrillation (CMS/HCC)  -     apixaban (Eliquis) 5 MG tablet tablet; Take 1 tablet by mouth Every 12 (Twelve) Hours.    Hypertension, essential  -     ramipril (ALTACE) 10 MG capsule; Take 1 capsule by mouth Daily.    Refills as above.      Return for As needed.      Benjamin Yuen MD

## 2020-11-16 ENCOUNTER — OFFICE VISIT (OUTPATIENT)
Dept: FAMILY MEDICINE CLINIC | Facility: CLINIC | Age: 85
End: 2020-11-16

## 2020-11-16 VITALS
HEIGHT: 70 IN | WEIGHT: 163 LBS | DIASTOLIC BLOOD PRESSURE: 72 MMHG | BODY MASS INDEX: 23.34 KG/M2 | TEMPERATURE: 98.1 F | OXYGEN SATURATION: 98 % | HEART RATE: 96 BPM | SYSTOLIC BLOOD PRESSURE: 126 MMHG

## 2020-11-16 DIAGNOSIS — E55.9 HYPOVITAMINOSIS D: ICD-10-CM

## 2020-11-16 DIAGNOSIS — E78.5 HYPERLIPIDEMIA, UNSPECIFIED HYPERLIPIDEMIA TYPE: ICD-10-CM

## 2020-11-16 DIAGNOSIS — I10 HYPERTENSION, ESSENTIAL: ICD-10-CM

## 2020-11-16 DIAGNOSIS — I48.21 PERMANENT ATRIAL FIBRILLATION (HCC): Primary | ICD-10-CM

## 2020-11-16 PROCEDURE — 99214 OFFICE O/P EST MOD 30 MIN: CPT | Performed by: FAMILY MEDICINE

## 2020-11-16 PROCEDURE — 93000 ELECTROCARDIOGRAM COMPLETE: CPT | Performed by: FAMILY MEDICINE

## 2020-11-16 RX ORDER — ATENOLOL 25 MG/1
25 TABLET ORAL DAILY
Qty: 90 TABLET | Refills: 1 | Status: SHIPPED | OUTPATIENT
Start: 2020-11-16 | End: 2021-03-03 | Stop reason: SDUPTHER

## 2020-11-16 RX ORDER — CELECOXIB 200 MG/1
200 CAPSULE ORAL DAILY
Qty: 90 CAPSULE | Refills: 1 | Status: CANCELLED | OUTPATIENT
Start: 2020-11-16

## 2020-11-16 RX ORDER — ATORVASTATIN CALCIUM 20 MG/1
20 TABLET, FILM COATED ORAL DAILY
Qty: 90 TABLET | Refills: 1 | Status: SHIPPED | OUTPATIENT
Start: 2020-11-16 | End: 2021-03-03 | Stop reason: SDUPTHER

## 2020-11-16 RX ORDER — RAMIPRIL 10 MG/1
10 CAPSULE ORAL DAILY
Qty: 90 CAPSULE | Refills: 1 | Status: SHIPPED | OUTPATIENT
Start: 2020-11-16 | End: 2021-03-03 | Stop reason: SDUPTHER

## 2020-11-16 NOTE — PROGRESS NOTES
Jasson Gonzalez is a 85 y.o. male.     Chief Complaint   Patient presents with   • Establish Care     new pt establishing with dr roberts    • Muscle Pain     needs a refill on rossi;ebrex pt is been taking this forver but doenst have ant pain    • Hyperlipidemia     no complains    • Insomnia     follow up no complains        HPI     Pt is a pleasant 85 y.o. YO male here for hyperlipidemia that is well controlled on atorvastatin 20 mg, arthritis that is well controlled on Celebrex 200 mg daily, atrial fibrillation that is well controlled with rate, not needing a beta-blocker, on Eliquis 5 mg twice daily without adverse effect, hypertension well controlled on ramipril.  Patient without bleeding despite being on Celebrex and Eliquis.  He has been on this for years.  Also with insomnia that is well controlled on Ambien 5 mg nightly as needed.      The following portions of the patient's history were reviewed and updated as appropriate: allergies, current medications, past family history, past medical history, past social history, past surgical history and problem list.    Review of Systems   Constitutional: Negative for chills, fatigue, fever and unexpected weight change.   HENT: Negative for ear pain, hearing loss, sinus pressure, sore throat and tinnitus.    Eyes: Negative for pain, discharge and redness.   Respiratory: Negative for cough, shortness of breath and wheezing.    Cardiovascular: Negative for chest pain, palpitations and leg swelling.   Gastrointestinal: Negative for abdominal pain, constipation, diarrhea and nausea.   Endocrine: Negative for cold intolerance and heat intolerance.   Genitourinary: Negative for difficulty urinating, flank pain and urgency.   Musculoskeletal: Negative for back pain, joint swelling and myalgias.   Skin: Negative for rash and wound.   Allergic/Immunologic: Negative for environmental allergies and food allergies.   Neurological: Negative for dizziness, seizures, numbness  and headaches.   Hematological: Negative for adenopathy. Does not bruise/bleed easily.   Psychiatric/Behavioral: Negative for decreased concentration, dysphoric mood and sleep disturbance. The patient is not nervous/anxious.    All other systems reviewed and are negative.      Objective  Vitals:    11/16/20 1336   BP: 126/72   Pulse: 96   Temp: 98.1 °F (36.7 °C)   SpO2: 98%        Physical Exam  Vitals signs and nursing note reviewed.   Constitutional:       General: He is not in acute distress.     Appearance: He is well-developed.   HENT:      Head: Normocephalic.      Nose: Nose normal.   Cardiovascular:      Rate and Rhythm: Normal rate and regular rhythm.      Heart sounds: Normal heart sounds. No murmur.   Pulmonary:      Effort: Pulmonary effort is normal. No respiratory distress.      Breath sounds: Normal breath sounds.   Musculoskeletal: Normal range of motion.   Skin:     General: Skin is warm and dry.      Findings: No rash.   Neurological:      Mental Status: He is alert and oriented to person, place, and time.   Psychiatric:         Behavior: Behavior normal.         Thought Content: Thought content normal.         Judgment: Judgment normal.           Current Outpatient Medications:   •  apixaban (Eliquis) 5 MG tablet tablet, Take 1 tablet by mouth Every 12 (Twelve) Hours., Disp: 180 tablet, Rfl: 1  •  atorvastatin (LIPITOR) 20 MG tablet, Take 1 tablet by mouth Daily., Disp: 90 tablet, Rfl: 1  •  ramipril (ALTACE) 10 MG capsule, Take 1 capsule by mouth Daily., Disp: 90 capsule, Rfl: 1  •  zolpidem (AMBIEN) 5 MG tablet, Take 1 tablet by mouth At Night As Needed for Sleep., Disp: 90 tablet, Rfl: 0  •  atenolol (Tenormin) 25 MG tablet, Take 1 tablet by mouth Daily., Disp: 90 tablet, Rfl: 1      ECG 12 Lead    Date/Time: 11/16/2020 5:04 PM  Performed by: Holly Kincaid MD  Authorized by: Holly Kincaid MD   Comparison: not compared with previous ECG   Previous ECG: no previous ECG available  Rhythm: atrial  fibrillation  Rate: normal  Conduction: conduction normal  ST Segments: ST segments normal  T Waves: T waves normal  QRS axis: normal  Other: no other findings  Other findings: left ventricular hypertrophy    Clinical impression: abnormal EKG  Comments: Atrial fibrillation with widened amplitude in the lateral leads.  RSR in V1.            Lab Results (most recent)     None              Diagnoses and all orders for this visit:    1. Permanent atrial fibrillation (CMS/HCC) (Primary)  -     atenolol (Tenormin) 25 MG tablet; Take 1 tablet by mouth Daily.  Dispense: 90 tablet; Refill: 1  -     apixaban (Eliquis) 5 MG tablet tablet; Take 1 tablet by mouth Every 12 (Twelve) Hours.  Dispense: 180 tablet; Refill: 1  -     Comprehensive Metabolic Panel  -     Vitamin D 25 Hydroxy  -     Lipid Panel  -     CBC Auto Differential  -     TSH  -     Adult Transthoracic Echo Complete W/ Cont if Necessary Per Protocol; Future  -     ECG 12 Lead    2. Hyperlipidemia, unspecified hyperlipidemia type  -     atorvastatin (LIPITOR) 20 MG tablet; Take 1 tablet by mouth Daily.  Dispense: 90 tablet; Refill: 1  -     Comprehensive Metabolic Panel  -     Vitamin D 25 Hydroxy  -     Lipid Panel  -     CBC Auto Differential  -     TSH    3. Hypertension, essential  -     ramipril (ALTACE) 10 MG capsule; Take 1 capsule by mouth Daily.  Dispense: 90 capsule; Refill: 1  -     Comprehensive Metabolic Panel  -     Vitamin D 25 Hydroxy  -     Lipid Panel  -     CBC Auto Differential  -     TSH    4. Hypovitaminosis D  -     Vitamin D 25 Hydroxy    Other orders  -     Cancel: celecoxib (CeleBREX) 200 MG capsule; Take 1 capsule by mouth Daily.  Dispense: 90 capsule; Refill: 1      Pleasant 85-year-old male here as a new patient to me.  We spent extensive time talking through his history.  He does have a longstanding history of persistent atrial fibrillation.  From what I can gather he has not been on a beta-blocker despite having heart rates in the  high 90s to low 100s.  He is otherwise asymptomatic.  Start atenolol as above, continue with statin and ACE inhibitor.  EKG performed showing increased amplitude in the lateral leads.  He has not had an EKG on file previously.  Echo ordered for further evaluation.  I recommended that he follow-up with cardiology, the patient declined at this time.  Will discuss further once echo results have returned.    Also patient with history of osteoarthritis but he has been off Celebrex for 3 weeks now with no change to his pain and he actually is feeling quite good.  As there is no increased bleeding risk on Eliquis, plan to stop Celebrex.  Discontinued.  If echo is otherwise normal, follow-up in 3 months to follow-up heart rate and atrial fibrillation.      Return in about 3 months (around 2/16/2021), or if symptoms worsen or fail to improve, for Recheck atrial fibrillation .      Holly Kincaid MD    Mask and protective eyewear worn by myself and medical assistant during entire patient encounter.

## 2020-11-17 ENCOUNTER — TELEPHONE (OUTPATIENT)
Dept: FAMILY MEDICINE CLINIC | Facility: CLINIC | Age: 85
End: 2020-11-17

## 2020-11-17 NOTE — TELEPHONE ENCOUNTER
I spoke with daughter explained about  Note from yesterday she understood dr roberts changes on medications and wanted to let us know about pt having issues with memory loss is getting worse and not sure if anything medication can help for this, also wanted to let us know about zolpidem refill I explained the protocols to follow up and he will need apt for this but with pt memory issues dr roberts probably consider another treatment for insomnia ,she states he had tried a few meds already without success he already brake 5mg in half he will do 0.25 if need it the patient had echo done just right before become dr evans pt I will try to request this records cardiologist dr peter agrawal

## 2020-11-17 NOTE — TELEPHONE ENCOUNTER
Thank you, that is very helpful.  He said he had never seen a cardiologist before but declined evaluation.  I also explained to him that he does need an appointment for initial prescriptions for controlled substances and would recommend other medications as Ambien can make dementia worse.  I do think it would be helpful to get a memory assessment.  Please recommend the patient make an appointment, I think it would be helpful to have a family member present.  Thank you

## 2020-11-17 NOTE — TELEPHONE ENCOUNTER
PATIENTS DAUGHTER NITA IS CALLING IN SHE STATES THAT PATIENT WAS IN OFFICE YESTERDAY AND HE IS HAVING TROUBLE REMEMBERING ALL THAT HAPPENED IN APPT.     SHE HAS SOME QUESTIONS ABOUT MEDICATION AND WHAT WAS DISCUSSED ABOUT BP AND THINGS.    SHE IS ON THE  VERBAL      PLEASE ADVISE    CALLBACK NUMBER IS:  [phone]

## 2020-11-18 NOTE — TELEPHONE ENCOUNTER
Left message to Tia pt daughter    74 yo F, poor historian, Hx of COPD BIBEMS after found down in her apartment for approximately 6 hours. Pt cannot recall events leading up to fall. Will check labs including cardiac enzymes, EKG, UA, CXR, XR pelvis, CT head/c-spine. will swab for COVID-19. will reassess. g

## 2020-12-18 ENCOUNTER — HOSPITAL ENCOUNTER (OUTPATIENT)
Dept: CARDIOLOGY | Facility: HOSPITAL | Age: 85
Discharge: HOME | End: 2020-12-18

## 2020-12-18 ENCOUNTER — LAB (OUTPATIENT)
Dept: LAB | Facility: HOSPITAL | Age: 85
End: 2020-12-18

## 2020-12-18 VITALS
SYSTOLIC BLOOD PRESSURE: 136 MMHG | HEIGHT: 70 IN | BODY MASS INDEX: 23.04 KG/M2 | WEIGHT: 160.94 LBS | DIASTOLIC BLOOD PRESSURE: 69 MMHG

## 2020-12-18 DIAGNOSIS — I48.21 PERMANENT ATRIAL FIBRILLATION (HCC): ICD-10-CM

## 2020-12-18 LAB
25(OH)D3 SERPL-MCNC: 38.3 NG/ML (ref 30–100)
ALBUMIN SERPL-MCNC: 4.1 G/DL (ref 3.5–5.2)
ALBUMIN/GLOB SERPL: 1.6 G/DL
ALP SERPL-CCNC: 100 U/L (ref 39–117)
ALT SERPL W P-5'-P-CCNC: 24 U/L (ref 1–41)
ANION GAP SERPL CALCULATED.3IONS-SCNC: 8.5 MMOL/L (ref 5–15)
AORTIC DIMENSIONLESS INDEX: 0.7 (DI)
AST SERPL-CCNC: 24 U/L (ref 1–40)
BASOPHILS # BLD AUTO: 0.03 10*3/MM3 (ref 0–0.2)
BASOPHILS NFR BLD AUTO: 0.3 % (ref 0–1.5)
BH CV ECHO MEAS - ACS: 2.1 CM
BH CV ECHO MEAS - AO MAX PG (FULL): 1.2 MMHG
BH CV ECHO MEAS - AO MAX PG: 4.1 MMHG
BH CV ECHO MEAS - AO MEAN PG (FULL): 1 MMHG
BH CV ECHO MEAS - AO MEAN PG: 2 MMHG
BH CV ECHO MEAS - AO ROOT AREA (BSA CORRECTED): 1.9
BH CV ECHO MEAS - AO ROOT AREA: 10.2 CM^2
BH CV ECHO MEAS - AO ROOT DIAM: 3.6 CM
BH CV ECHO MEAS - AO V2 MAX: 101 CM/SEC
BH CV ECHO MEAS - AO V2 MEAN: 70.8 CM/SEC
BH CV ECHO MEAS - AO V2 VTI: 20.4 CM
BH CV ECHO MEAS - ASC AORTA: 3.5 CM
BH CV ECHO MEAS - AVA(I,A): 1.7 CM^2
BH CV ECHO MEAS - AVA(I,D): 1.7 CM^2
BH CV ECHO MEAS - AVA(V,A): 1.9 CM^2
BH CV ECHO MEAS - AVA(V,D): 1.9 CM^2
BH CV ECHO MEAS - BSA(HAYCOCK): 1.9 M^2
BH CV ECHO MEAS - BSA: 1.9 M^2
BH CV ECHO MEAS - BZI_BMI: 23.3 KILOGRAMS/M^2
BH CV ECHO MEAS - BZI_METRIC_HEIGHT: 177 CM
BH CV ECHO MEAS - BZI_METRIC_WEIGHT: 73 KG
BH CV ECHO MEAS - EDV(CUBED): 68.9 ML
BH CV ECHO MEAS - EDV(MOD-SP2): 57 ML
BH CV ECHO MEAS - EDV(MOD-SP4): 73 ML
BH CV ECHO MEAS - EDV(TEICH): 74.2 ML
BH CV ECHO MEAS - EF(CUBED): 60.8 %
BH CV ECHO MEAS - EF(MOD-BP): 51.7 %
BH CV ECHO MEAS - EF(MOD-SP2): 50.9 %
BH CV ECHO MEAS - EF(MOD-SP4): 53.4 %
BH CV ECHO MEAS - EF(TEICH): 52.8 %
BH CV ECHO MEAS - ESV(CUBED): 27 ML
BH CV ECHO MEAS - ESV(MOD-SP2): 28 ML
BH CV ECHO MEAS - ESV(MOD-SP4): 34 ML
BH CV ECHO MEAS - ESV(TEICH): 35 ML
BH CV ECHO MEAS - FS: 26.8 %
BH CV ECHO MEAS - IVS/LVPW: 1.3
BH CV ECHO MEAS - IVSD: 0.8 CM
BH CV ECHO MEAS - LV DIASTOLIC VOL/BSA (35-75): 38.5 ML/M^2
BH CV ECHO MEAS - LV IVRT: 0.1 SEC
BH CV ECHO MEAS - LV MASS(C)D: 81.7 GRAMS
BH CV ECHO MEAS - LV MASS(C)DI: 43.1 GRAMS/M^2
BH CV ECHO MEAS - LV MAX PG: 2.9 MMHG
BH CV ECHO MEAS - LV MEAN PG: 1 MMHG
BH CV ECHO MEAS - LV SYSTOLIC VOL/BSA (12-30): 17.9 ML/M^2
BH CV ECHO MEAS - LV V1 MAX: 85 CM/SEC
BH CV ECHO MEAS - LV V1 MEAN: 54.5 CM/SEC
BH CV ECHO MEAS - LV V1 VTI: 15.2 CM
BH CV ECHO MEAS - LVIDD: 4.1 CM
BH CV ECHO MEAS - LVIDS: 3 CM
BH CV ECHO MEAS - LVLD AP2: 7.6 CM
BH CV ECHO MEAS - LVLD AP4: 7.1 CM
BH CV ECHO MEAS - LVLS AP2: 6.9 CM
BH CV ECHO MEAS - LVLS AP4: 6.3 CM
BH CV ECHO MEAS - LVOT AREA (M): 2.3 CM^2
BH CV ECHO MEAS - LVOT AREA: 2.3 CM^2
BH CV ECHO MEAS - LVOT DIAM: 1.7 CM
BH CV ECHO MEAS - LVPWD: 0.6 CM
BH CV ECHO MEAS - MR MAX PG: 78.9 MMHG
BH CV ECHO MEAS - MR MAX VEL: 444 CM/SEC
BH CV ECHO MEAS - MR MEAN PG: 56 MMHG
BH CV ECHO MEAS - MR MEAN VEL: 359 CM/SEC
BH CV ECHO MEAS - MR VTI: 140 CM
BH CV ECHO MEAS - MV DEC SLOPE: 716 CM/SEC^2
BH CV ECHO MEAS - MV E MAX VEL: 91.9 CM/SEC
BH CV ECHO MEAS - MV MAX PG: 3.3 MMHG
BH CV ECHO MEAS - MV MEAN PG: 1 MMHG
BH CV ECHO MEAS - MV P1/2T MAX VEL: 96 CM/SEC
BH CV ECHO MEAS - MV P1/2T: 39.3 MSEC
BH CV ECHO MEAS - MV V2 MAX: 90.5 CM/SEC
BH CV ECHO MEAS - MV V2 MEAN: 46.3 CM/SEC
BH CV ECHO MEAS - MV V2 VTI: 20.5 CM
BH CV ECHO MEAS - MVA P1/2T LCG: 2.3 CM^2
BH CV ECHO MEAS - MVA(P1/2T): 5.6 CM^2
BH CV ECHO MEAS - MVA(VTI): 1.7 CM^2
BH CV ECHO MEAS - PA ACC TIME: 0.12 SEC
BH CV ECHO MEAS - PA MAX PG (FULL): 5.4 MMHG
BH CV ECHO MEAS - PA MAX PG: 6.3 MMHG
BH CV ECHO MEAS - PA PR(ACCEL): 25.5 MMHG
BH CV ECHO MEAS - PA V2 MAX: 125 CM/SEC
BH CV ECHO MEAS - PVA(V,A): 1.3 CM^2
BH CV ECHO MEAS - PVA(V,D): 1.3 CM^2
BH CV ECHO MEAS - QP/QS: 1
BH CV ECHO MEAS - RAP SYSTOLE: 3 MMHG
BH CV ECHO MEAS - RV MAX PG: 0.84 MMHG
BH CV ECHO MEAS - RV MEAN PG: 1 MMHG
BH CV ECHO MEAS - RV V1 MAX: 45.7 CM/SEC
BH CV ECHO MEAS - RV V1 MEAN: 33.9 CM/SEC
BH CV ECHO MEAS - RV V1 VTI: 10.4 CM
BH CV ECHO MEAS - RVOT AREA: 3.5 CM^2
BH CV ECHO MEAS - RVOT DIAM: 2.1 CM
BH CV ECHO MEAS - RVSP: 33 MMHG
BH CV ECHO MEAS - SI(AO): 109.5 ML/M^2
BH CV ECHO MEAS - SI(CUBED): 22.1 ML/M^2
BH CV ECHO MEAS - SI(LVOT): 18.2 ML/M^2
BH CV ECHO MEAS - SI(MOD-SP2): 15.3 ML/M^2
BH CV ECHO MEAS - SI(MOD-SP4): 20.6 ML/M^2
BH CV ECHO MEAS - SI(TEICH): 20.7 ML/M^2
BH CV ECHO MEAS - SV(AO): 207.6 ML
BH CV ECHO MEAS - SV(CUBED): 41.9 ML
BH CV ECHO MEAS - SV(LVOT): 34.5 ML
BH CV ECHO MEAS - SV(MOD-SP2): 29 ML
BH CV ECHO MEAS - SV(MOD-SP4): 39 ML
BH CV ECHO MEAS - SV(RVOT): 36 ML
BH CV ECHO MEAS - SV(TEICH): 39.2 ML
BH CV ECHO MEAS - TR MAX PG: 30 MMHG
BH CV ECHO MEAS - TR MAX VEL: 273 CM/SEC
BH CV XLRA - RV BASE: 3.6 CM
BH CV XLRA - RV LENGTH: 5.5 CM
BH CV XLRA - TDI S': 13.1 CM/SEC
BILIRUB SERPL-MCNC: 0.6 MG/DL (ref 0–1.2)
BUN SERPL-MCNC: 17 MG/DL (ref 8–23)
BUN/CREAT SERPL: 17.7 (ref 7–25)
CALCIUM SPEC-SCNC: 9.2 MG/DL (ref 8.6–10.5)
CHLORIDE SERPL-SCNC: 105 MMOL/L (ref 98–107)
CHOLEST SERPL-MCNC: 111 MG/DL (ref 0–200)
CO2 SERPL-SCNC: 26.5 MMOL/L (ref 22–29)
CREAT SERPL-MCNC: 0.96 MG/DL (ref 0.76–1.27)
DEPRECATED RDW RBC AUTO: 43.4 FL (ref 37–54)
EOSINOPHIL # BLD AUTO: 0.24 10*3/MM3 (ref 0–0.4)
EOSINOPHIL NFR BLD AUTO: 2.8 % (ref 0.3–6.2)
ERYTHROCYTE [DISTWIDTH] IN BLOOD BY AUTOMATED COUNT: 13 % (ref 12.3–15.4)
GFR SERPL CREATININE-BSD FRML MDRD: 75 ML/MIN/1.73
GLOBULIN UR ELPH-MCNC: 2.6 GM/DL
GLUCOSE SERPL-MCNC: 89 MG/DL (ref 65–99)
HCT VFR BLD AUTO: 38 % (ref 37.5–51)
HDLC SERPL-MCNC: 32 MG/DL (ref 40–60)
HGB BLD-MCNC: 12.7 G/DL (ref 13–17.7)
IMM GRANULOCYTES # BLD AUTO: 0.03 10*3/MM3 (ref 0–0.05)
IMM GRANULOCYTES NFR BLD AUTO: 0.3 % (ref 0–0.5)
IVRT: 98 MSEC
LDLC SERPL CALC-MCNC: 59 MG/DL (ref 0–100)
LDLC/HDLC SERPL: 1.81 {RATIO}
LEFT ATRIUM VOLUME INDEX: 39.6 ML/M2
LYMPHOCYTES # BLD AUTO: 0.92 10*3/MM3 (ref 0.7–3.1)
LYMPHOCYTES NFR BLD AUTO: 10.6 % (ref 19.6–45.3)
MAXIMAL PREDICTED HEART RATE: 136 BPM
MCH RBC QN AUTO: 30.8 PG (ref 26.6–33)
MCHC RBC AUTO-ENTMCNC: 33.4 G/DL (ref 31.5–35.7)
MCV RBC AUTO: 92 FL (ref 79–97)
MONOCYTES # BLD AUTO: 0.65 10*3/MM3 (ref 0.1–0.9)
MONOCYTES NFR BLD AUTO: 7.5 % (ref 5–12)
NEUTROPHILS NFR BLD AUTO: 6.85 10*3/MM3 (ref 1.7–7)
NEUTROPHILS NFR BLD AUTO: 78.5 % (ref 42.7–76)
NRBC BLD AUTO-RTO: 0 /100 WBC (ref 0–0.2)
PLATELET # BLD AUTO: 250 10*3/MM3 (ref 140–450)
PMV BLD AUTO: 9.7 FL (ref 6–12)
POTASSIUM SERPL-SCNC: 4.3 MMOL/L (ref 3.5–5.2)
PROT SERPL-MCNC: 6.7 G/DL (ref 6–8.5)
RBC # BLD AUTO: 4.13 10*6/MM3 (ref 4.14–5.8)
SODIUM SERPL-SCNC: 140 MMOL/L (ref 136–145)
STRESS TARGET HR: 116 BPM
TRIGL SERPL-MCNC: 105 MG/DL (ref 0–150)
TSH SERPL DL<=0.05 MIU/L-ACNC: 3.66 UIU/ML (ref 0.27–4.2)
VLDLC SERPL-MCNC: 20 MG/DL (ref 5–40)
WBC # BLD AUTO: 8.72 10*3/MM3 (ref 3.4–10.8)

## 2020-12-18 PROCEDURE — 80053 COMPREHEN METABOLIC PANEL: CPT | Performed by: FAMILY MEDICINE

## 2020-12-18 PROCEDURE — 82306 VITAMIN D 25 HYDROXY: CPT | Performed by: FAMILY MEDICINE

## 2020-12-18 PROCEDURE — 85025 COMPLETE CBC W/AUTO DIFF WBC: CPT | Performed by: FAMILY MEDICINE

## 2020-12-18 PROCEDURE — 93306 TTE W/DOPPLER COMPLETE: CPT

## 2020-12-18 PROCEDURE — 80061 LIPID PANEL: CPT | Performed by: FAMILY MEDICINE

## 2020-12-18 PROCEDURE — 93306 TTE W/DOPPLER COMPLETE: CPT | Performed by: INTERNAL MEDICINE

## 2020-12-18 PROCEDURE — 36415 COLL VENOUS BLD VENIPUNCTURE: CPT | Performed by: FAMILY MEDICINE

## 2020-12-18 PROCEDURE — 84443 ASSAY THYROID STIM HORMONE: CPT | Performed by: FAMILY MEDICINE

## 2020-12-24 NOTE — PROGRESS NOTES
Please call patient back with results.  The echocardiogram has resulted as overall normal function of the heart but inconsistent atrial fibrillation.  I do think it would be helpful to have regular follow-up with your cardiologist.  I was looking through your chart and was not completely sure who you have been seeing?  Bety, do you mind getting more information about this?    Thank you

## 2021-03-03 ENCOUNTER — OFFICE VISIT (OUTPATIENT)
Dept: FAMILY MEDICINE CLINIC | Facility: CLINIC | Age: 86
End: 2021-03-03

## 2021-03-03 VITALS
SYSTOLIC BLOOD PRESSURE: 116 MMHG | BODY MASS INDEX: 22.62 KG/M2 | TEMPERATURE: 97 F | OXYGEN SATURATION: 98 % | HEIGHT: 70 IN | WEIGHT: 158 LBS | DIASTOLIC BLOOD PRESSURE: 74 MMHG | HEART RATE: 62 BPM

## 2021-03-03 DIAGNOSIS — I10 HYPERTENSION, ESSENTIAL: ICD-10-CM

## 2021-03-03 DIAGNOSIS — E78.5 HYPERLIPIDEMIA, UNSPECIFIED HYPERLIPIDEMIA TYPE: ICD-10-CM

## 2021-03-03 DIAGNOSIS — I48.21 PERMANENT ATRIAL FIBRILLATION (HCC): ICD-10-CM

## 2021-03-03 DIAGNOSIS — F51.01 PRIMARY INSOMNIA: ICD-10-CM

## 2021-03-03 PROCEDURE — 99214 OFFICE O/P EST MOD 30 MIN: CPT | Performed by: FAMILY MEDICINE

## 2021-03-03 RX ORDER — ZOLPIDEM TARTRATE 5 MG/1
5 TABLET ORAL NIGHTLY PRN
Qty: 90 TABLET | Refills: 0 | Status: SHIPPED | OUTPATIENT
Start: 2021-03-03 | End: 2021-07-01 | Stop reason: SDUPTHER

## 2021-03-03 RX ORDER — RAMIPRIL 10 MG/1
10 CAPSULE ORAL DAILY
Qty: 90 CAPSULE | Refills: 1 | Status: SHIPPED | OUTPATIENT
Start: 2021-03-03 | End: 2021-09-29

## 2021-03-03 RX ORDER — ATORVASTATIN CALCIUM 20 MG/1
20 TABLET, FILM COATED ORAL DAILY
Qty: 90 TABLET | Refills: 1 | Status: SHIPPED | OUTPATIENT
Start: 2021-03-03 | End: 2021-05-21

## 2021-03-03 RX ORDER — ATENOLOL 25 MG/1
25 TABLET ORAL DAILY
Qty: 90 TABLET | Refills: 1 | Status: SHIPPED | OUTPATIENT
Start: 2021-03-03 | End: 2021-05-21

## 2021-03-03 NOTE — PROGRESS NOTES
"Chief Complaint  Hypertension (no complains), Atrial Fibrillation (follow up  no complains ), and Insomnia (follow up pt would like refill on his zolpidem )    Subjective          Jasson Gonzalez presents to [facility]  History of Present Illness  Patient here for follow-up hypertension that is well controlled, atrial fibrillation that is doing better with addition of beta-blocker.  Rate controlled now.  Echocardiogram performed November 2020 showing an EF of 56 to 60% with normal left ventricular systolic function.  He does have some mild diastolic dysfunction, mild mitral regurgitation, mild tricuspid regurgitation but overall reassuring.       Objective   Vital Signs:   /74   Pulse 62   Temp 97 °F (36.1 °C)   Ht 177.8 cm (70\")   Wt 71.7 kg (158 lb)   SpO2 98%   BMI 22.67 kg/m²     Physical Exam  Vitals signs and nursing note reviewed.   Constitutional:       General: He is not in acute distress.     Appearance: He is well-developed.   HENT:      Head: Normocephalic.      Nose: Nose normal.   Cardiovascular:      Rate and Rhythm: Normal rate and regular rhythm.      Heart sounds: Normal heart sounds. No murmur.   Pulmonary:      Effort: Pulmonary effort is normal. No respiratory distress.      Breath sounds: Normal breath sounds.   Musculoskeletal: Normal range of motion.   Skin:     General: Skin is warm and dry.      Findings: No rash.   Neurological:      Mental Status: He is alert and oriented to person, place, and time.   Psychiatric:         Behavior: Behavior normal.         Thought Content: Thought content normal.         Judgment: Judgment normal.        Result Review :                 Assessment and Plan    Diagnoses and all orders for this visit:    1. Hyperlipidemia, unspecified hyperlipidemia type  -     atorvastatin (LIPITOR) 20 MG tablet; Take 1 tablet by mouth Daily.  Dispense: 90 tablet; Refill: 1    2. Hypertension, essential  -     ramipril (ALTACE) " 10 MG capsule; Take 1 capsule by mouth Daily.  Dispense: 90 capsule; Refill: 1    3. Permanent atrial fibrillation (CMS/HCC)  -     atenolol (Tenormin) 25 MG tablet; Take 1 tablet by mouth Daily.  Dispense: 90 tablet; Refill: 1  -     apixaban (Eliquis) 5 MG tablet tablet; Take 1 tablet by mouth Every 12 (Twelve) Hours.  Dispense: 180 tablet; Refill: 1    4. Primary insomnia  -     zolpidem (AMBIEN) 5 MG tablet; Take 1 tablet by mouth At Night As Needed for Sleep.  Dispense: 90 tablet; Refill: 0    Pleasant 85-year-old male here to follow-up atrial fibrillation, now well controlled.  Heart rate has improved on atenolol.  Blood pressure also well controlled.  He is having no adverse effects, good energy.  No adverse effects of bleeding with Eliquis.  no chest pain palpitations or shortness of breath.  Nat recent echo results with patient.  Hyperlipidemia well-controlled on atorvastatin.  We had a discussion about seeing cardiology.  I do think it would be helpful but he is wanting to treat with medications but would not want to have any interventions or procedures.  As he is feeling good he would prefer not to see a cardiologist.  He has had many stress tests, echoes and other cardiac interventions in the past.    Hyperlipidemia well-controlled as above.    Insomnia well controlled on Ambien.  He has been on the same dose for years now.  No adverse effects.  He does feel like it improves his quality of life significantly.  Tutu up-to-date, signed controlled substance abuse agreement and contract.  Transferred this medication to me from his last PCP.  UDS performed.      Follow Up   Return in about 3 months (around 6/3/2021), or if symptoms worsen or fail to improve, for Recheck Insomnia.  Patient was given instructions and counseling regarding his condition or for health maintenance advice. Please see specific information pulled into the AVS if appropriate. Medical assistant and I wore mask and eyewear protection  during entire encounter.  Patient wore mask.    Holly Kincaid MD

## 2021-05-20 DIAGNOSIS — I48.21 PERMANENT ATRIAL FIBRILLATION (HCC): ICD-10-CM

## 2021-05-20 DIAGNOSIS — E78.5 HYPERLIPIDEMIA, UNSPECIFIED HYPERLIPIDEMIA TYPE: ICD-10-CM

## 2021-05-21 RX ORDER — ATENOLOL 25 MG/1
TABLET ORAL
Qty: 90 TABLET | Refills: 1 | Status: SHIPPED | OUTPATIENT
Start: 2021-05-21 | End: 2021-11-19

## 2021-05-21 RX ORDER — ATORVASTATIN CALCIUM 20 MG/1
TABLET, FILM COATED ORAL
Qty: 90 TABLET | Refills: 1 | Status: SHIPPED | OUTPATIENT
Start: 2021-05-21 | End: 2022-01-12

## 2021-07-01 ENCOUNTER — TELEPHONE (OUTPATIENT)
Dept: FAMILY MEDICINE CLINIC | Facility: CLINIC | Age: 86
End: 2021-07-01

## 2021-07-01 DIAGNOSIS — F51.01 PRIMARY INSOMNIA: ICD-10-CM

## 2021-07-01 RX ORDER — ZOLPIDEM TARTRATE 5 MG/1
5 TABLET ORAL NIGHTLY PRN
Qty: 30 TABLET | Refills: 0 | Status: SHIPPED | OUTPATIENT
Start: 2021-07-01 | End: 2021-09-02 | Stop reason: SDUPTHER

## 2021-07-01 NOTE — TELEPHONE ENCOUNTER
Caller: Christiane Matthew    Relationship: Emergency Contact    Best call back number: [phone]     Medication needed:   Requested Prescriptions     Pending Prescriptions Disp Refills   • zolpidem (AMBIEN) 5 MG tablet 90 tablet 0     Sig: Take 1 tablet by mouth At Night As Needed for Sleep.       When do you need the refill by: ASAP    What additional details did the patient provide when requesting the medication: ONLY HAS ONE DAY. SAID THIS IS THIRD REQUEST    Does the patient have less than a 3 day supply:  [x] Yes  [] No    What is the patient's preferred pharmacy: [id number] - [place], [place] - [address] - [phone]  - [phone] FX

## 2021-08-29 DIAGNOSIS — F51.01 PRIMARY INSOMNIA: ICD-10-CM

## 2021-08-30 RX ORDER — ZOLPIDEM TARTRATE 5 MG/1
TABLET ORAL
Qty: 30 TABLET | Refills: 0 | OUTPATIENT
Start: 2021-08-30

## 2021-09-02 RX ORDER — ZOLPIDEM TARTRATE 5 MG/1
5 TABLET ORAL NIGHTLY PRN
Qty: 30 TABLET | Refills: 0 | Status: SHIPPED | OUTPATIENT
Start: 2021-09-02 | End: 2022-11-17

## 2021-09-20 ENCOUNTER — TELEPHONE (OUTPATIENT)
Dept: FAMILY MEDICINE CLINIC | Facility: CLINIC | Age: 86
End: 2021-09-20

## 2021-09-20 NOTE — TELEPHONE ENCOUNTER
PATIENT DAUGHTER NITA HAIR REQUESTED TO GO OVER SOME THINGS AT PATIENT APPT TOMORROW:    PATIENT HAS BEEN WALKING HORRIBLY, PATIENTS DAUGHTER WANTS TO SEE IF ITS HIS HIPS OR KNEES .  PATIENT FELL SEVERAL TIMES BUT SOME ONE HAS BEEN THERE TO CATCH HIM BEFORE PATIENT GETS HURT.    POSSIBLE PHYSICAL THERAPY TO HELP WITH THIS OR AN ANTI INFLAMMATORY.     PATIENT ALSO HAVING MEMORY ISSUES, AND REQUESTING IF THERE IS ANYTHING TO HELP WITH THIS.    PATIENT IS TAKING A 1/4 OF A 5 MG TABLET OF AMBIEN AT BEDTIME AND IS WORKING FINE.      PATIENTS WIFE WILL BE ACCOMPANYING PATIENT IN TOMORROW.  PATIENTS DAUGHTER MOE STATED IF DR YANG NEEDS TO TALK WITH HER TO PLEASE REACH OUT.

## 2021-09-20 NOTE — TELEPHONE ENCOUNTER
Please let her know that I did receive her message, we will follow things up at the follow-up appointment.

## 2021-09-21 ENCOUNTER — OFFICE VISIT (OUTPATIENT)
Dept: FAMILY MEDICINE CLINIC | Facility: CLINIC | Age: 86
End: 2021-09-21

## 2021-09-21 VITALS
HEIGHT: 70 IN | TEMPERATURE: 97.6 F | SYSTOLIC BLOOD PRESSURE: 128 MMHG | DIASTOLIC BLOOD PRESSURE: 72 MMHG | BODY MASS INDEX: 21.62 KG/M2 | OXYGEN SATURATION: 99 % | HEART RATE: 81 BPM | WEIGHT: 151 LBS

## 2021-09-21 DIAGNOSIS — I10 HYPERTENSION, ESSENTIAL: ICD-10-CM

## 2021-09-21 DIAGNOSIS — I48.21 PERMANENT ATRIAL FIBRILLATION (HCC): Primary | ICD-10-CM

## 2021-09-21 DIAGNOSIS — R29.6 MULTIPLE FALLS: ICD-10-CM

## 2021-09-21 DIAGNOSIS — R41.3 MEMORY LOSS: ICD-10-CM

## 2021-09-21 DIAGNOSIS — E78.41 ELEVATED LIPOPROTEIN(A): ICD-10-CM

## 2021-09-21 DIAGNOSIS — F03.90 DEMENTIA WITHOUT BEHAVIORAL DISTURBANCE, UNSPECIFIED DEMENTIA TYPE: ICD-10-CM

## 2021-09-21 PROCEDURE — 99214 OFFICE O/P EST MOD 30 MIN: CPT | Performed by: FAMILY MEDICINE

## 2021-09-21 NOTE — PROGRESS NOTES
"Chief Complaint  Insomnia (follow up no complains )    Subjective          Jasson Gonzalez presents to [facility]  History of Present Illness  Pleasant 86-year-old male here to follow-up insomnia, dementia, hypertension and following.    HTN well controlled     Falling at home, 2 falls this week. He has been getting progressively weaker, especially with standing from a seated position. Thankfully he has been around others and has not injured himself during any of these falls.    Patient with memory loss that is progressively worsening. Some widened gait as well. No acute causes for changes to memory but has been ongoing for some time now.    Insomnia well controlled on Ambien, he does use it rarely, he reports he takes 1 tablet every few days. Per family report it does not seem that the Ambien has been related to the falls but I will discuss this with his daughter Tia tomorrow. There may be other options to discuss for insomnia that might be safe for him to have a lower fall risk. She is a pharmacist and will discuss other medication to start in the past. He and his wife are not completely sure right now.    Objective   Vital Signs:   /72   Pulse 81   Temp 97.6 °F (36.4 °C)   Ht 177.8 cm (70\")   Wt 68.5 kg (151 lb)   SpO2 99%   BMI 21.67 kg/m²     Physical Exam  Vitals and nursing note reviewed.   Constitutional:       General: He is not in acute distress.     Appearance: He is well-developed.   HENT:      Head: Normocephalic.      Nose: Nose normal.   Cardiovascular:      Rate and Rhythm: Normal rate and regular rhythm.      Heart sounds: Normal heart sounds. No murmur heard.     Pulmonary:      Effort: Pulmonary effort is normal. No respiratory distress.      Breath sounds: Normal breath sounds.   Musculoskeletal:         General: Normal range of motion.   Skin:     General: Skin is warm and dry.      Findings: No rash.   Neurological:      Mental Status: He is alert " and oriented to person, place, and time.   Psychiatric:         Behavior: Behavior normal.         Thought Content: Thought content normal.         Judgment: Judgment normal.        Result Review :                 Assessment and Plan    Diagnoses and all orders for this visit:    1. Permanent atrial fibrillation (CMS/HCC) (Primary)    2. Elevated lipoprotein(a)  -     Lipid Panel    3. Hypertension, essential  -     Comprehensive Metabolic Panel  -     CBC & Differential  -     TSH Rfx On Abnormal To Free T4    4. Memory loss  -     MRI Brain Without Contrast; Future  -     TSH Rfx On Abnormal To Free T4  -     Ambulatory Referral to Physical Therapy Evaluate and treat    5. Dementia without behavioral disturbance, unspecified dementia type (CMS/HCC)  -     MRI Brain Without Contrast; Future  -     Comprehensive Metabolic Panel  -     Vitamin B12  -     Folate    6. Multiple falls  -     Ambulatory Referral to Physical Therapy Evaluate and treat    Atrial fibrillation and hypertension well-controlled, due for labs as above. Performed today.    Patient with insomnia, okay to continue with Ambien for now, I would like to consider the medications like mirtazapine as this may help with some of the decreased appetite which could be related to some of the muscle loss as well. I will discuss this with his daughter tomorrow. I am not sure which medications he tried in the past.    Dementia that is worsening. Patient did not remember 2 falls that were earlier this week and could not recall events from short-term memory. MMSE performed today that was 28/30 but has a high baseline from a cognitive standpoint. He failed a clock drawing, scanned to chart. MRI for further evaluation as above.    Patient with overall increased weakness and falling. I think this is a combination of decreased mobility and nutrition. I do think physical therapy is helpful and ordered as above, however he will need to maintain good protein intake in  order to rebuild muscle. Discussed adding a protein supplement to his daily routine.      Follow Up   Return in about 4 weeks (around 10/19/2021), or if symptoms worsen or fail to improve, for Recheck MRI brain/ dementia .  Patient was given instructions and counseling regarding his condition or for health maintenance advice. Please see specific information pulled into the AVS if appropriate. Medical assistant and I wore mask and eyewear protection during entire encounter.  Patient wore mask.    Holly Kincaid MD

## 2021-09-22 LAB
ALBUMIN SERPL-MCNC: 4.3 G/DL (ref 3.5–5.2)
ALBUMIN/GLOB SERPL: 2.4 G/DL
ALP SERPL-CCNC: 90 U/L (ref 39–117)
ALT SERPL-CCNC: 18 U/L (ref 1–41)
AST SERPL-CCNC: 25 U/L (ref 1–40)
BASOPHILS # BLD AUTO: 0.04 10*3/MM3 (ref 0–0.2)
BASOPHILS NFR BLD AUTO: 0.4 % (ref 0–1.5)
BILIRUB SERPL-MCNC: 0.4 MG/DL (ref 0–1.2)
BUN SERPL-MCNC: 19 MG/DL (ref 8–23)
BUN/CREAT SERPL: 17.6 (ref 7–25)
CALCIUM SERPL-MCNC: 9.1 MG/DL (ref 8.6–10.5)
CHLORIDE SERPL-SCNC: 102 MMOL/L (ref 98–107)
CHOLEST SERPL-MCNC: 118 MG/DL (ref 0–200)
CO2 SERPL-SCNC: 25.5 MMOL/L (ref 22–29)
CREAT SERPL-MCNC: 1.08 MG/DL (ref 0.76–1.27)
EOSINOPHIL # BLD AUTO: 0.18 10*3/MM3 (ref 0–0.4)
EOSINOPHIL NFR BLD AUTO: 1.8 % (ref 0.3–6.2)
ERYTHROCYTE [DISTWIDTH] IN BLOOD BY AUTOMATED COUNT: 12.6 % (ref 12.3–15.4)
FOLATE SERPL-MCNC: >20 NG/ML (ref 4.78–24.2)
GLOBULIN SER CALC-MCNC: 1.8 GM/DL
GLUCOSE SERPL-MCNC: 103 MG/DL (ref 65–99)
HCT VFR BLD AUTO: 37.4 % (ref 37.5–51)
HDLC SERPL-MCNC: 29 MG/DL (ref 40–60)
HGB BLD-MCNC: 12.7 G/DL (ref 13–17.7)
IMM GRANULOCYTES # BLD AUTO: 0.04 10*3/MM3 (ref 0–0.05)
IMM GRANULOCYTES NFR BLD AUTO: 0.4 % (ref 0–0.5)
LDLC SERPL CALC-MCNC: 61 MG/DL (ref 0–100)
LYMPHOCYTES # BLD AUTO: 1.22 10*3/MM3 (ref 0.7–3.1)
LYMPHOCYTES NFR BLD AUTO: 11.9 % (ref 19.6–45.3)
MCH RBC QN AUTO: 31.6 PG (ref 26.6–33)
MCHC RBC AUTO-ENTMCNC: 34 G/DL (ref 31.5–35.7)
MCV RBC AUTO: 93 FL (ref 79–97)
MONOCYTES # BLD AUTO: 0.85 10*3/MM3 (ref 0.1–0.9)
MONOCYTES NFR BLD AUTO: 8.3 % (ref 5–12)
NEUTROPHILS # BLD AUTO: 7.9 10*3/MM3 (ref 1.7–7)
NEUTROPHILS NFR BLD AUTO: 77.2 % (ref 42.7–76)
NRBC BLD AUTO-RTO: 0 /100 WBC (ref 0–0.2)
PLATELET # BLD AUTO: 222 10*3/MM3 (ref 140–450)
POTASSIUM SERPL-SCNC: 4.4 MMOL/L (ref 3.5–5.2)
PROT SERPL-MCNC: 6.1 G/DL (ref 6–8.5)
RBC # BLD AUTO: 4.02 10*6/MM3 (ref 4.14–5.8)
SODIUM SERPL-SCNC: 139 MMOL/L (ref 136–145)
TRIGL SERPL-MCNC: 161 MG/DL (ref 0–150)
TSH SERPL DL<=0.005 MIU/L-ACNC: 2.45 UIU/ML (ref 0.27–4.2)
VIT B12 SERPL-MCNC: 429 PG/ML (ref 211–946)
VLDLC SERPL CALC-MCNC: 28 MG/DL (ref 5–40)
WBC # BLD AUTO: 10.23 10*3/MM3 (ref 3.4–10.8)

## 2021-09-26 DIAGNOSIS — I48.21 PERMANENT ATRIAL FIBRILLATION (HCC): ICD-10-CM

## 2021-09-27 RX ORDER — APIXABAN 5 MG/1
TABLET, FILM COATED ORAL
Qty: 180 TABLET | Refills: 1 | Status: SHIPPED | OUTPATIENT
Start: 2021-09-27 | End: 2022-05-02

## 2021-09-27 NOTE — TELEPHONE ENCOUNTER
Rx Refill Note  Requested Prescriptions     Pending Prescriptions Disp Refills   • Eliquis 5 MG tablet tablet [Pharmacy Med Name: ELIQUIS 5 MG TABLET] 180 tablet 1     Sig: TAKE ONE TABLET BY MOUTH EVERY 12 HOURS      Last office visit with prescribing clinician: 9/21/2021      Next office visit with prescribing clinician: 10/19/2021            Christine Dos Santos MA  09/27/21, 15:44 EDT

## 2021-09-28 DIAGNOSIS — I10 HYPERTENSION, ESSENTIAL: ICD-10-CM

## 2021-09-29 RX ORDER — RAMIPRIL 10 MG/1
CAPSULE ORAL
Qty: 90 CAPSULE | Refills: 1 | Status: SHIPPED | OUTPATIENT
Start: 2021-09-29 | End: 2022-02-23

## 2021-10-19 ENCOUNTER — OFFICE VISIT (OUTPATIENT)
Dept: FAMILY MEDICINE CLINIC | Facility: CLINIC | Age: 86
End: 2021-10-19

## 2021-10-19 VITALS
DIASTOLIC BLOOD PRESSURE: 64 MMHG | OXYGEN SATURATION: 98 % | TEMPERATURE: 97.8 F | WEIGHT: 150 LBS | HEART RATE: 74 BPM | HEIGHT: 70 IN | SYSTOLIC BLOOD PRESSURE: 126 MMHG | BODY MASS INDEX: 21.47 KG/M2

## 2021-10-19 DIAGNOSIS — G30.1 LATE ONSET ALZHEIMER'S DEMENTIA WITHOUT BEHAVIORAL DISTURBANCE (HCC): Primary | ICD-10-CM

## 2021-10-19 DIAGNOSIS — F02.80 LATE ONSET ALZHEIMER'S DEMENTIA WITHOUT BEHAVIORAL DISTURBANCE (HCC): Primary | ICD-10-CM

## 2021-10-19 PROCEDURE — 99213 OFFICE O/P EST LOW 20 MIN: CPT | Performed by: FAMILY MEDICINE

## 2021-10-19 RX ORDER — DONEPEZIL HYDROCHLORIDE 5 MG/1
5 TABLET, FILM COATED ORAL NIGHTLY
Qty: 30 TABLET | Refills: 2 | Status: SHIPPED | OUTPATIENT
Start: 2021-10-19 | End: 2021-12-28 | Stop reason: SDUPTHER

## 2021-10-19 RX ORDER — DONEPEZIL HYDROCHLORIDE 5 MG/1
5 TABLET, FILM COATED ORAL NIGHTLY
Qty: 30 TABLET | Refills: 2 | Status: SHIPPED | OUTPATIENT
Start: 2021-10-19 | End: 2021-10-19 | Stop reason: SDUPTHER

## 2021-10-19 NOTE — PROGRESS NOTES
"Chief Complaint  Insomnia (follow up on ambient  doing well no complains )    Subjective          Jasson Gonzalez presents to [facility] PRIMARY [facility]  History of Present Illness  Pleasant 86-year-old male here to follow-up insomnia which is doing well on Ambien.    Of note, he has been having worsening dementia.  He is having more short-term recall deficits, he has done well on an MMSE (28/30) performed in the office but did fail clock drawing.  We performed an MRI at the last appointment to discuss today.  Unfortunately due to some delays with scheduling has not been able to have the test performed and has not started physical therapy yet but plans to do physical therapy starting tomorrow and the MRI later on this month.      Objective   Vital Signs:   /64   Pulse 74   Temp 97.8 °F (36.6 °C)   Ht 177.8 cm (70\")   Wt 68 kg (150 lb)   SpO2 98%   BMI 21.52 kg/m²     Physical Exam  Vitals and nursing note reviewed.   Constitutional:       General: He is not in acute distress.     Appearance: He is well-developed.   HENT:      Head: Normocephalic.      Nose: Nose normal.   Cardiovascular:      Heart sounds: No murmur heard.      Pulmonary:      Effort: Pulmonary effort is normal. No respiratory distress.   Musculoskeletal:         General: Normal range of motion.   Skin:     General: Skin is warm and dry.      Findings: No rash.   Neurological:      Mental Status: He is alert and oriented to person, place, and time.   Psychiatric:         Behavior: Behavior normal.         Thought Content: Thought content normal.         Judgment: Judgment normal.        Result Review :                 Assessment and Plan    Diagnoses and all orders for this visit:    1. 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  Dispense: 30 tablet; Refill: 2  -     donepezil (ARICEPT) 5 MG " tablet; Take 1 tablet by mouth Every Night.  Dispense: 30 tablet; Refill: 2    Pleasant 86-year-old male here to follow-up memory loss.  There has been a dramatic decline in his memory over the last year, I do think some of this is related to the COVID-19 pandemic.  He also has grown in weakness and had more falls.  At the last appointment he was prescribed physical therapy and had an MRI of the brain ordered, unfortunately due to delays with the pandemic neither have been performed.  He is starting therapy tomorrow and an MRI later on this month.  As I do not have either of these to review before making a decision, I think it would best to go ahead and start him on Aricept 5 mg nightly.  Discussed adverse effects of diarrhea.  I am hopeful that he will be able to tolerate the 5 mg dose.  Follow-up with me in 8 weeks to discuss MRI and FU falls after 8 weeks of physical therapy.      Follow Up   Return in about 8 weeks (around 12/14/2021), or if symptoms worsen or fail to improve, for Recheck memory loss and balance issues.  Patient was given instructions and counseling regarding his condition or for health maintenance advice. Please see specific information pulled into the AVS if appropriate. Medical assistant and I wore mask and eyewear protection during entire encounter.  Patient and his wife wore mask.    Holly Kincaid MD

## 2021-10-20 ENCOUNTER — TREATMENT (OUTPATIENT)
Dept: PHYSICAL THERAPY | Facility: CLINIC | Age: 86
End: 2021-10-20

## 2021-10-20 DIAGNOSIS — R29.6 MULTIPLE FALLS: Primary | ICD-10-CM

## 2021-10-20 DIAGNOSIS — R26.89 BALANCE PROBLEM: ICD-10-CM

## 2021-10-20 PROCEDURE — 97110 THERAPEUTIC EXERCISES: CPT | Performed by: PHYSICAL THERAPIST

## 2021-10-20 PROCEDURE — 97162 PT EVAL MOD COMPLEX 30 MIN: CPT | Performed by: PHYSICAL THERAPIST

## 2021-10-20 NOTE — PROGRESS NOTES
Physical Therapy Initial Evaluation and Plan of Care      Patient: Jasson Gonzalez   : 1935  Diagnosis/ICD-10 Code:  Multiple falls [R29.6]  Referring practitioner: Holly Kincaid MD  Date of Initial Visit: 10/20/2021  Today's Date: 10/20/2021  Patient seen for 1 sessions           Subjective Evaluation    History of Present Illness  Mechanism of injury: Patient referred to PT for multiple falls and a balance problem. His most recent fall was about a month ago but no injuries to report (2-3 falls over the past 6 months). He believes the LOB is related to lack of attention. Patient does not rely on any sort of AD. Patient has never participated in physical therapy. Patient does have stairs at home but he does hold onto the railing for safety. Patient reports no specific incident that has affected his balance, just a slow decline in stability over the years    PMH: heart murmur, Previous B TKAs  (L) and  (R)    Patient lives with his wife, used to do yardwork but now only mows lawn with seated powermower      Patient Occupation: Retired Quality of life: fair    Pain  Location: no reports of pain  Aggravating factors: ambulation and stairs  Progression: worsening    Social Support  Lives in: multiple-level home  Lives with: spouse    Patient Goals  Patient goals for therapy: increased strength, improved balance and independence with ADLs/IADLs             Objective          Neurological Testing     Sensation     Lumbar   Left   Intact: light touch    Right   Intact: light touch    Strength/Myotome Testing     Left Hip   Planes of Motion   Flexion: 4+    Right Hip   Planes of Motion   Flexion: 4-    Left Knee   Flexion: 4  Extension: 4    Right Knee   Flexion: 4  Extension: 3-    Left Ankle/Foot   Dorsiflexion: 3    Right Ankle/Foot   Dorsiflexion: 3-    Ambulation     Comments   Wide MARGO, steppage gait due to lack of DF AROM for foot clearance, forward trunk lean, B foot eversion    Functional  Assessment     Comments  SEE ROSE    Relies on hands for sit to stand transfer        See Exercise, Manual, and Modality Logs for complete treatment.       Functional Outcome Score: ROSE = 23; 5xSTS 13.1 sec w/ hands on arm rests    EXERCISES:  -Ankle AROM DF 20x  -Mini Squats 20x  -Heel raises x20  -SLS 20 sec      Assessment & Plan     Assessment  Impairments: abnormal gait, activity intolerance, impaired balance, impaired physical strength, lacks appropriate home exercise program and pain with function  Assessment details: Jasson Gonzalez is a 86 y.o. year-old male referred to physical therapy for multiple falls. He presents with a evolving clinical presentation.  He has comorbidities Hyperlipidemia, Hypertension, and a heart murmur and personal factors of mostly relying on his wife for transportation which may affect his progress in the plan of care.  Signs and symptoms are consistent with physical therapy diagnosis of a balance problem. Patient is appropriate for skilled physical therapy in order to reduce pain and increase ease with daily mobility.   Prognosis: good  Functional Limitations: lifting, walking, uncomfortable because of pain, sitting and standing  Goals  Plan Goals: Short Term Goals for completion in 30 days:   -Patient will report compliance and independence with initial HEP  -Patient will improve ability to complete 6 alternating toe taps to increase LE coordination    LTGs for completion within 90 days:  -Patient will demonstrate sit to stand without use of hands in order to increase functional strength  -Patient will increase ROSE from 23 to 30 to reduce risk of falls  -Patient will improve 5xSTS from 13.1 seconds to </=11 seconds in order to decrease risk of falls and increase functional strength  -Patient will increase LE knee extension strength to 4-/5 or greater to increase LE stability during gait    Plan  Therapy options: will be seen for skilled physical therapy services  Planned  modality interventions: TENS, traction, ultrasound and dry needling  Planned therapy interventions: postural training, stretching, therapeutic activities, transfer training, gait training, functional ROM exercises, home exercise program, strengthening, manual therapy, balance/weight-bearing training, body mechanics training, neuromuscular re-education, ADL retraining and flexibility  Other planned therapy interventions: Aquatic therapy  Frequency: 2x week (36 visits)  Plan details: therapy for core stabilization, LE strength/stability, gait training, balance, and posture        Timed:  Manual Therapy:         mins  12870;  Therapeutic Exercise:    9     mins  65635;     Neuromuscular Salvador:        mins  28837;    Therapeutic Activity:          mins  93449;     Gait Training:           mins  26922;     Ultrasound:          mins  11649;    Electrical Stimulation:         mins  69707 ( );  Iontophoresis         mins 67156  Dry Needling        mins      Untimed:  Electrical Stimulation:         mins  28060 ( );  Mechanical Traction:         mins  07159;     Timed Treatment:  9    mins   Total Treatment:     30   mins    PT SIGNATURE: Ani Mehta PT   DATE TREATMENT INITIATED: 10/20/2021    Initial Certification  Certification Period: 1/18/2022  I certify that the therapy services are furnished while this patient is under my care.  The services outlined above are required by this patient, and will be reviewed every 90 days.     PHYSICIAN: Holly Kincaid MD      DATE:     Please sign and return via fax to [phone] Thank you, [facility] Therapy.

## 2021-10-29 ENCOUNTER — HOSPITAL ENCOUNTER (OUTPATIENT)
Dept: MRI IMAGING | Facility: HOSPITAL | Age: 86
Discharge: HOME | End: 2021-10-29
Admitting: FAMILY MEDICINE

## 2021-10-29 DIAGNOSIS — F03.90 DEMENTIA WITHOUT BEHAVIORAL DISTURBANCE, UNSPECIFIED DEMENTIA TYPE: ICD-10-CM

## 2021-10-29 DIAGNOSIS — R41.3 MEMORY LOSS: ICD-10-CM

## 2021-10-29 PROCEDURE — 70551 MRI BRAIN STEM W/O DYE: CPT

## 2021-11-19 ENCOUNTER — TREATMENT (OUTPATIENT)
Dept: PHYSICAL THERAPY | Facility: CLINIC | Age: 86
End: 2021-11-19

## 2021-11-19 DIAGNOSIS — R29.6 MULTIPLE FALLS: ICD-10-CM

## 2021-11-19 DIAGNOSIS — R26.89 BALANCE PROBLEM: Primary | ICD-10-CM

## 2021-11-19 DIAGNOSIS — I48.21 PERMANENT ATRIAL FIBRILLATION (HCC): ICD-10-CM

## 2021-11-19 PROCEDURE — 97116 GAIT TRAINING THERAPY: CPT | Performed by: PHYSICAL THERAPIST

## 2021-11-19 PROCEDURE — 97530 THERAPEUTIC ACTIVITIES: CPT | Performed by: PHYSICAL THERAPIST

## 2021-11-19 PROCEDURE — 97110 THERAPEUTIC EXERCISES: CPT | Performed by: PHYSICAL THERAPIST

## 2021-11-19 RX ORDER — ATENOLOL 25 MG/1
TABLET ORAL
Qty: 90 TABLET | Refills: 1 | Status: SHIPPED | OUTPATIENT
Start: 2021-11-19 | End: 2022-06-20

## 2021-11-19 NOTE — PROGRESS NOTES
"30-Day / 10-Visit Progress Note         Patient: Jasson Gonzalez   : 1935  Diagnosis/ICD-10 Code:  Balance problem [R26.89]  Referring practitioner: Holly Kincaid MD  Date of Initial Visit: Type: THERAPY  Noted: 10/20/2021  Today's Date: 2021  Patient seen for 2 sessions      Subjective:     Clinical Progress: unchanged  Home Program Compliance: Yes  Treatment has included:  therapeutic exercise, therapeutic activity, gait training and neuro-muscular retraining.    Subjective   Patient reports he feels like his balance is about the same. No changes in the past month, no falls to report.    Objective     Strength/Myotome Testing      Left Hip   Planes of Motion   Flexion: 4+     Right Hip   Planes of Motion   Flexion: 4-     Left Knee   Flexion: 4+  Extension: 4+     Right Knee   Flexion: 4  Extension: 3-     Left Ankle/Foot   Dorsiflexion: 3     Right Ankle/Foot   Dorsiflexion: 3-     Ambulation      Comments   Wide MARGO, steppage gait due to lack of DF AROM for foot clearance, forward trunk lean, B foot eversion     Functional Assessment   -SEE ROSE  -Relies on hands for sit to stand transfer      See Exercise, Manual, and Modality Logs for complete treatment.      Functional Outcome Score: ROSE = 25; 5xSTS 10.4 sec w/ hands on arm rests     EXERCISES:  -Walk track 1 lap: emphasis on arm swing, heel toe gait, and upright posture. Multiple rest breaks in standing and sitting during lap.  -1K Leg Press, 80 lbs b LE x20, 60 lbs single leg x20 each  -6K hip abduction machine, 40 lbs, 2x15  -4\" Step ups on L, 2\" Step ups on R x20 each  -Alt toe taps x20 (4\" height)  -Ankle AROM DF 20x  -Mini Squats 20x  -Heel raises x20  -Sit to stand no hands off elevated mat table x10      Assessment/Plan  Jasson Gonzalez has been seen for 2 physical therapy sessions for balance training due to multiple falls.  Treatment has included therapeutic exercise, therapeutic activity, gait training and neuro-muscular " retraining. Progress to physical therapy goals is slow since today is his first treatment session due to difficulty with scheduling. Since he hasn't had any treatment sessions prior to today there is very little change in his objective measures which is to be expected. He has slightly improved his score on the ROSE and 5xSTS test however he continues to rely heavily on UE support with transfers.  He will benefit from continued skilled physical therapy to address remaining impairments and functional limitations.      Short Term Goals for completion in 30 days:   -Patient will report compliance and independence with initial HEP (MET)  -Patient will improve ability to complete 6 alternating toe taps to increase LE coordination (ONGOING)    LTGs for completion within 90 days:  -Patient will demonstrate sit to stand without use of hands in order to increase functional strength (ONGOING)  -Patient will increase ROSE from 23 to 30 to reduce risk of falls (ONGOING)  -Patient will improve 5xSTS from 13.1 seconds to </=11 seconds in order to decrease risk of falls and increase functional strength (PARTIALLY MET, with hands)  -Patient will increase LE knee extension strength to 4-/5 or greater to increase LE stability during gait (ONGOING)      Recommendations: Continue as planned  Timeframe: 1 month; 2x/week  Prognosis to achieve goals: good    PT Signature: Ani Mehta PT    License Number: [id number]    Electronically signed by Ani Mehta PT, 11/19/21, 8:35 AM EST      Based upon review of the patient's progress and continued therapy plan, it is my medical opinion that Jasson Gonzalez should continue physical therapy treatment at [facility]  [address] [place] [address] [address] [place] [address]  [phone].    Signature: __________________________________  Holly Kincaid MD    Timed:  Manual Therapy:         mins  29749;  Therapeutic Exercise:    20     mins  07360;      Neuromuscular Salvador:    2    mins  62705;    Therapeutic Activity:     8     mins  85863;     Gait Training:      10     mins  52098;     Ultrasound:          mins  93779;    Iontophoresis         mins 82189;  Dry Needling                   mins 20560/20561 (Self-pay)    Untimed:  Electrical Stimulation:         mins  15663 ( );  Traction:         mins  22762;     Timed Treatment:   40   mins   Total Treatment:     40   mins

## 2021-11-22 ENCOUNTER — TREATMENT (OUTPATIENT)
Dept: PHYSICAL THERAPY | Facility: CLINIC | Age: 86
End: 2021-11-22

## 2021-11-22 DIAGNOSIS — R29.6 MULTIPLE FALLS: Primary | ICD-10-CM

## 2021-11-22 DIAGNOSIS — R26.89 BALANCE PROBLEM: ICD-10-CM

## 2021-11-22 PROCEDURE — 97116 GAIT TRAINING THERAPY: CPT | Performed by: PHYSICAL THERAPIST

## 2021-11-22 PROCEDURE — 97110 THERAPEUTIC EXERCISES: CPT | Performed by: PHYSICAL THERAPIST

## 2021-11-22 PROCEDURE — 97530 THERAPEUTIC ACTIVITIES: CPT | Performed by: PHYSICAL THERAPIST

## 2021-11-22 NOTE — PROGRESS NOTES
"Physical Therapy Daily Progress Note    Patient: Jasson Gonzalez   : 1935  Diagnosis/ICD-10 Code:  Multiple falls [R29.6]  Referring practitioner: Holly Kincaid MD  Date of Initial Visit: Type: THERAPY  Noted: 10/20/2021  Today's Date: 2021  Patient seen for 3 sessions           Subjective   Patient reports he wasn't too sore after his first PT session.    Objective       EXERCISES:  -Walk track 2 laps: emphasis on arm swing, heel toe gait, and upright posture. 1 seated rest break with each lap. Use of cane in L UE with education on proper gait sequencing  -1K Leg Press, 80 lbs B LE 2x20, 60 lbs single leg x20 each  -6K hip abduction machine, 40 lbs, 2x15  -4\" Step ups on L, 2\" Step ups on R x20 each  -Alt toe taps x20 (4\" height) *defer  -Ankle AROM DF 20x  -Mini Squats 20x *Defer  -Heel raises x20 *Defer  -Cable side step with 7.5 lbs resistance, x5 each direction, min A for safety.  -Sit to stand no hands off elevated mat table x10    Assessment/Plan  Had patient walk with straight cane today which significantly reduced the extensiveness of his trunk sway and helped him walk with better endurance and posture. Still advancing his legs with steppage gait due to lack of ankle DF. Slightly better quad control on R for leg press today however there is still poor eccentric muscle control with R LE step ups.          Timed:    Manual Therapy:         mins  52954;  Therapeutic Exercise:    20     mins  27316;     Neuromuscular Salvador:        mins  01166;    Therapeutic Activity:    8      mins  19523;     Gait Trainin     mins  30442;     Ultrasound:          mins  34535;    Electrical Stimulation:         mins  80383 ( );  Iontophoresis         mins 33363;  Aquatic Therapy         mins 16995;  Dry Needling                   mins /  (Self-pay)    Untimed:  Electrical Stimulation:         mins  69294 ( );  Traction:         mins  38568;     Timed Treatment:   40   mins "   Total Treatment:     40   mins    Ani Mehta PT  Physical Therapist    [place] License:[id number]

## 2021-12-01 ENCOUNTER — TREATMENT (OUTPATIENT)
Dept: PHYSICAL THERAPY | Facility: CLINIC | Age: 86
End: 2021-12-01

## 2021-12-01 DIAGNOSIS — R29.6 MULTIPLE FALLS: Primary | ICD-10-CM

## 2021-12-01 DIAGNOSIS — R26.89 BALANCE PROBLEM: ICD-10-CM

## 2021-12-01 PROCEDURE — 97116 GAIT TRAINING THERAPY: CPT | Performed by: PHYSICAL THERAPIST

## 2021-12-01 PROCEDURE — 97110 THERAPEUTIC EXERCISES: CPT | Performed by: PHYSICAL THERAPIST

## 2021-12-01 PROCEDURE — 97530 THERAPEUTIC ACTIVITIES: CPT | Performed by: PHYSICAL THERAPIST

## 2021-12-01 NOTE — PROGRESS NOTES
"Physical Therapy Daily Progress Note    Patient: Jasson Gonzalez   : 1935  Diagnosis/ICD-10 Code:  Multiple falls [R29.6]  Referring practitioner: Holly Kincaid MD  Date of Initial Visit: Type: THERAPY  Noted: 10/20/2021  Today's Date: 2021  Patient seen for 4 sessions           Subjective   Patient reports he is using the cane at home and he can tell it helps his balance.    Objective   See Exercise, Manual, and Modality Logs for complete treatment.     EXERCISES:  -Walk track 2 laps: emphasis on arm swing, heel toe gait, and upright posture. 1 seated rest break with each lap. Use of cane in L UE with education on proper gait sequencing  -A Leg Press, 30 lbs B LE 3x15  -6K hip abduction machine, 50 lbs, 2x15  -4\" Step ups on L, 2\" Step ups on R x20 each  -Alt toe taps x20 (4\" height)   -Ankle AROM DF 20x *defer  -Mini Squats 20x   -Heel raises x20   -Cable side step with 7.5 lbs resistance, x5 each direction, min A for safety.  -Sit to stand no hands off elevated mat table x20    Assessment/Plan  Patient continues to ambulate with forward flexed posture while walking track and when side stepping with the cable resistance. Encouraged more upright posture to prevent anterior LOB. Also focused on forward weight shift prior to step ups since patient seems to be doing more of a body weight shift instead of a true step up. Still significant weakness in R quad.         Timed:    Manual Therapy:         mins  60953;  Therapeutic Exercise:    22     mins  09189;     Neuromuscular Salvador:        mins  19244;    Therapeutic Activity:     8     mins  93302;     Gait Training:      10     mins  30083;     Ultrasound:          mins  89506;    Electrical Stimulation:         mins  21427 ( );  Iontophoresis         mins 38493;  Aquatic Therapy         mins 19463;  Dry Needling                   mins /  (Self-pay)    Untimed:  Electrical Stimulation:         mins  36094 ( );  Traction:         " mins  16954;     Timed Treatment:   40   mins   Total Treatment:     40   mins    Ani Mehta PT  Physical Therapist    [place] License:[id number]

## 2021-12-06 ENCOUNTER — TREATMENT (OUTPATIENT)
Dept: PHYSICAL THERAPY | Facility: CLINIC | Age: 86
End: 2021-12-06

## 2021-12-06 DIAGNOSIS — R29.6 MULTIPLE FALLS: ICD-10-CM

## 2021-12-06 DIAGNOSIS — R26.89 BALANCE PROBLEM: Primary | ICD-10-CM

## 2021-12-06 PROCEDURE — 97116 GAIT TRAINING THERAPY: CPT | Performed by: PHYSICAL THERAPIST

## 2021-12-06 PROCEDURE — 97530 THERAPEUTIC ACTIVITIES: CPT | Performed by: PHYSICAL THERAPIST

## 2021-12-06 PROCEDURE — 97110 THERAPEUTIC EXERCISES: CPT | Performed by: PHYSICAL THERAPIST

## 2021-12-06 NOTE — PROGRESS NOTES
"Physical Therapy Daily Progress Note    Patient: Jasson Gonzalez   : 1935  Diagnosis/ICD-10 Code:  Balance problem [R26.89]  Referring practitioner: Holly Kincaid MD  Date of Initial Visit: Type: THERAPY  Noted: 10/20/2021  Today's Date: 2021  Patient seen for 5 sessions           Subjective   Jasson reports he is used to using a cane from when he had knee surgery.    Objective   See Exercise, Manual, and Modality Logs for complete treatment.     EXERCISES:  -Walk track 2 laps: emphasis on arm swing, heel toe gait, and upright posture. 1 seated rest break with each lap. Use of cane in L UE with education on proper gait sequencing  -1K Leg Press, 80 lbs B LE x20, S/L x20   -6K hip abduction machine, 50 lbs, 2x15  -4\" Step ups on L, 2\" Step ups on R x20 each  -Alt toe taps x20 (6\" height)   -Ankle AROM DF 20x   -Mini Squats 20x *defer  -Heel raises x20   -Cable side step with 7.5 lbs resistance, x5 each direction, min A for safety.  -Sit to stand no hands off elevated mat table x20       Assessment/Plan  Jasson continues to deal with fatigue when completing standing exercises or when walking. The cane significantly improves his posture and stamina but it is still limited. Focused on trying to keep toes facing forward during side steps and limiting UE support with sit to stands and toe taps.          Timed:    Manual Therapy:         mins  36009;  Therapeutic Exercise:    22     mins  84622;     Neuromuscular Salvador:        mins  26641;    Therapeutic Activity:     10     mins  34316;     Gait Trainin     mins  17449;     Ultrasound:          mins  07274;    Electrical Stimulation:         mins  56649 ( );  Iontophoresis         mins 35459;  Aquatic Therapy         mins 17153;  Dry Needling                   mins 87398/  (Self-pay)    Untimed:  Electrical Stimulation:         mins  98899 ( );  Traction:         mins  95767;     Timed Treatment:   40   mins   Total Treatment:    "  40   mins    Ani Mehta, PT  Physical Therapist    [place] License:[id number]

## 2021-12-08 ENCOUNTER — TREATMENT (OUTPATIENT)
Dept: PHYSICAL THERAPY | Facility: CLINIC | Age: 86
End: 2021-12-08

## 2021-12-08 DIAGNOSIS — R26.89 BALANCE PROBLEM: Primary | ICD-10-CM

## 2021-12-08 DIAGNOSIS — R29.6 MULTIPLE FALLS: ICD-10-CM

## 2021-12-08 PROCEDURE — 97116 GAIT TRAINING THERAPY: CPT | Performed by: PHYSICAL THERAPIST

## 2021-12-08 PROCEDURE — 97112 NEUROMUSCULAR REEDUCATION: CPT | Performed by: PHYSICAL THERAPIST

## 2021-12-08 PROCEDURE — 97110 THERAPEUTIC EXERCISES: CPT | Performed by: PHYSICAL THERAPIST

## 2021-12-08 NOTE — PROGRESS NOTES
"Physical Therapy Daily Progress Note    Patient: Jasson Gonzalez   : 1935  Diagnosis/ICD-10 Code:  Balance problem [R26.89]  Referring practitioner: Holly Kincaid MD  Date of Initial Visit: Type: THERAPY  Noted: 10/20/2021  Today's Date: 2021  Patient seen for 6 sessions           Subjective   Patient reports he is feeling about the same.    Objective   See Exercise, Manual, and Modality Logs for complete treatment.        EXERCISES:  -Walk track 2 laps: emphasis on arm swing, heel toe gait, and upright posture. 1 seated rest break with each lap. Use of cane in L UE with education on proper gait sequencing  -1K Leg Press, 80 lbs B LE x20, S/L x20   -6K hip abduction machine, 50 lbs, 3x15  -4\" Step ups on L, 2\" Step ups on R x20 each  -Alt toe taps x20 (6\" height) *Defer  -Ankle AROM DF 20x   -Mini Squats 15x  -Walking march with emphasis on SLS along rail, 20 ft x 2  -LAQ x15 on R LE  -Heel raises x20   -Cable side step with 7.5 lbs resistance, x5 each direction, min A for safety.  -Sit to stand no hands off elevated mat table x20 *defer    Assessment/Plan  Patient demonstrates a significant improvement in his R knee quad activation with seated LAQ and leg press. Still a lot of R knee compensation noted with step ups. Patient also was able to complete one full lap on track without rest break indicating increased stamina.         Timed:    Manual Therapy:         mins  93431;  Therapeutic Exercise:    21     mins  14373;     Neuromuscular Salvador:   9     mins  46605;    Therapeutic Activity:          mins  48504;     Gait Training:      10     mins  81538;     Ultrasound:          mins  71277;    Electrical Stimulation:         mins  55112 ( );  Iontophoresis         mins 59554;  Aquatic Therapy         mins 09635;  Dry Needling                   mins /  (Self-pay)    Untimed:  Electrical Stimulation:         mins  52245 ( );  Traction:         mins  20007;     Timed Treatment:   " 40   mins   Total Treatment:     40   mins    Ani Mehta PT  Physical Therapist    [place] License:[id number]

## 2021-12-13 ENCOUNTER — TREATMENT (OUTPATIENT)
Dept: PHYSICAL THERAPY | Facility: CLINIC | Age: 86
End: 2021-12-13

## 2021-12-13 DIAGNOSIS — R26.89 BALANCE PROBLEM: Primary | ICD-10-CM

## 2021-12-13 DIAGNOSIS — R29.6 MULTIPLE FALLS: ICD-10-CM

## 2021-12-13 PROCEDURE — 97110 THERAPEUTIC EXERCISES: CPT | Performed by: PHYSICAL THERAPIST

## 2021-12-13 PROCEDURE — 97530 THERAPEUTIC ACTIVITIES: CPT | Performed by: PHYSICAL THERAPIST

## 2021-12-13 NOTE — PROGRESS NOTES
"Physical Therapy Daily Progress Note    Patient: Jasson Gonzalez   : 1935  Diagnosis/ICD-10 Code:  Balance problem [R26.89]  Referring practitioner: Holly Kincaid MD  Date of Initial Visit: Type: THERAPY  Noted: 10/20/2021  Today's Date: 2021  Patient seen for 7 sessions           Subjective   Patient reports he is feeling okay, lets still are getting tired with walking    Objective   See Exercise, Manual, and Modality Logs for complete treatment.     EXERCISES:  -Walk track 2 laps: emphasis on arm swing, heel toe gait, and upright posture. 1 seated rest break with each lap. Use of cane in L UE with education on proper gait sequencing  -1K Leg Press, 80 lbs B LE x20, S/L x20   -6K hip abduction machine, 50 lbs, 3x15  -4\" Step ups on L, 2\" Step ups on R x20 each  -Alt toe taps x20 (6\" height) *Defer  -Ankle AROM DF 20x   -Mini Squats 15x  -Walking march with emphasis on SLS along rail, 20 ft x 2  -LAQ x15 on R LE  -Heel raises x20   -Cable side step with 7.5 lbs resistance, x5 each direction, min A for safety.  -Sit to stand no hands off elevated mat table x20 *defer    Assessment/Plan   Patient continues to require intermittent seated rest breaks throughout the session. He had trouble isolating R quad during LAQ exercise due to excessive activation from hip flexors. Patient also has trouble keeping his foot on the footrests when completing leg press exercise.            Timed:    Manual Therapy:         mins  70667;  Therapeutic Exercise:    20     mins  87635;     Neuromuscular Salvador:        mins  34822;    Therapeutic Activity:     10     mins  61490;     Gait Training:           mins  75295;     Ultrasound:          mins  13232;    Electrical Stimulation:         mins  40877 ( );  Iontophoresis         mins 80557;  Aquatic Therapy         mins 78045;  Dry Needling                   mins /  (Self-pay)    Untimed:  Electrical Stimulation:         mins  20250 ( );  Traction:   "       mins  94424;     Timed Treatment:   30   mins   Total Treatment:     30   mins    Ani Mehta PT  Physical Therapist    [place] License:[id number]

## 2021-12-15 ENCOUNTER — TREATMENT (OUTPATIENT)
Dept: PHYSICAL THERAPY | Facility: CLINIC | Age: 86
End: 2021-12-15

## 2021-12-15 DIAGNOSIS — R26.89 BALANCE PROBLEM: Primary | ICD-10-CM

## 2021-12-15 DIAGNOSIS — R29.6 MULTIPLE FALLS: ICD-10-CM

## 2021-12-15 PROCEDURE — 97530 THERAPEUTIC ACTIVITIES: CPT | Performed by: PHYSICAL THERAPIST

## 2021-12-15 PROCEDURE — 97116 GAIT TRAINING THERAPY: CPT | Performed by: PHYSICAL THERAPIST

## 2021-12-15 PROCEDURE — 97110 THERAPEUTIC EXERCISES: CPT | Performed by: PHYSICAL THERAPIST

## 2021-12-15 NOTE — PROGRESS NOTES
"30-Day / 10-Visit Progress Note         Patient: Jasson Gonzalez   : 1935  Diagnosis/ICD-10 Code:  Balance problem [R26.89]  Referring practitioner: Holly Kincaid MD  Date of Initial Visit: Type: THERAPY  Noted: 10/20/2021  Today's Date: 12/15/2021  Patient seen for 8 sessions      Subjective:     Clinical Progress: improved  Home Program Compliance: Yes  Treatment has included:  therapeutic exercise, therapeutic activity, gait training and neuro-muscular retraining.    Subjective   Patient reports he can tell he is a little more steady from PT and his R knee strength has improved.    Objective       Strength/Myotome Testing      Left Hip   Planes of Motion   Flexion: 4+     Right Hip   Planes of Motion   Flexion: 4-     Left Knee   Flexion: 4+  Extension: 4+     Right Knee   Flexion: 4  Extension: 3     Left Ankle/Foot   Dorsiflexion: 3     Right Ankle/Foot   Dorsiflexion: 3-     Ambulation      Comments   Wide MARGO, steppage gait due to lack of DF AROM for foot clearance, forward trunk lean, B foot eversion     Functional Assessment   -SEE ROSE  -Relies on hands for sit to stand transfer      See Exercise, Manual, and Modality Logs for complete treatment.      Functional Outcome Score: ROSE = 24; 5xSTS 15.6 sec (no hands off mat table)    EXERCISES:  -Walk track 2 laps: emphasis on arm swing, heel toe gait, and upright posture. 1 seated rest break with each lap. Use of cane in L UE with education on proper gait sequencing  -1K Leg Press, 80 lbs B LE x20, S/L x20   -6K hip abduction machine, 50 lbs, 3x15  -4\" Step ups on L, 2\" Step ups on R x20 each  -Alt toe taps x20 (6\" height) *Defer  -Ankle AROM DF 20x   -Mini Squats 15x  -Walking march with emphasis on SLS along rail, 20 ft x 2 *defer  -LAQ x15 on R LE  -Heel raises x20   -Cable side step with 7.5 lbs resistance, x5 each direction, min A for safety.  -Sit to stand no hands off elevated mat table x10    Assessment/Plan    Jasson Gonzalez has been " seen for 8 physical therapy sessions for balance training due to multiple falls.  Treatment has included therapeutic exercise, therapeutic activity, gait training and neuro-muscular retraining. Progress to physical therapy goals is good. Patient demonstrates a slight improvement in his R quad strength and walking tolerance is slowly increasing. He still is much weaker in his R LE compared to the L and he favor his L side significant with transfers. Patient is now walking with a cane which has increased his overall stability. He will benefit from continued skilled physical therapy to address remaining impairments and functional limitations.                 Short Term Goals for completion in 30 days:   -Patient will report compliance and independence with initial HEP (MET)  -Patient will improve ability to complete 6 alternating toe taps to increase LE coordination (ONGOING)    LTGs for completion within 90 days:  -Patient will demonstrate sit to stand without use of hands in order to increase functional strength (ONGOING)  -Patient will increase ROSE from 23 to 30 to reduce risk of falls (ONGOING)  -Patient will improve 5xSTS from 13.1 seconds to </=11 seconds in order to decrease risk of falls and increase functional strength (ONGOING)  -Patient will increase LE knee extension strength to 4-/5 or greater to increase LE stability during gait (ONGOING)       Recommendations: Continue as planned  Timeframe: 1 month; 2x/week  Prognosis to achieve goals: good      PT Signature: Ani Mehta PT    License Number: [id number]    Electronically signed by Ani Mehta, PT, 12/15/21, 11:06 AM EST      Based upon review of the patient's progress and continued therapy plan, it is my medical opinion that Jasson Gonzalez should continue physical therapy treatment at [facility]  [address] [place] [address] [address] [place] [address]  [phone].    Signature:  __________________________________  Holly Kincaid MD    Timed:  Manual Therapy:         mins  05916;  Therapeutic Exercise:    20     mins  94648;     Neuromuscular Salvador:        mins  91776;    Therapeutic Activity:     10     mins  26273;     Gait Training:     10      mins  20633;     Ultrasound:          mins  63467;    Iontophoresis        mins 49429;  Dry Needling                   mins [id number] (Self-pay)    Untimed:  Electrical Stimulation:         mins  73218 ( );  Traction:         mins  41470;     Timed Treatment:   40   mins   Total Treatment:     40   mins

## 2021-12-22 ENCOUNTER — TREATMENT (OUTPATIENT)
Dept: PHYSICAL THERAPY | Facility: CLINIC | Age: 86
End: 2021-12-22

## 2021-12-22 DIAGNOSIS — R29.6 MULTIPLE FALLS: ICD-10-CM

## 2021-12-22 DIAGNOSIS — R26.89 BALANCE PROBLEM: Primary | ICD-10-CM

## 2021-12-22 PROCEDURE — 97530 THERAPEUTIC ACTIVITIES: CPT | Performed by: PHYSICAL THERAPIST

## 2021-12-22 PROCEDURE — 97110 THERAPEUTIC EXERCISES: CPT | Performed by: PHYSICAL THERAPIST

## 2021-12-22 NOTE — PROGRESS NOTES
"Physical Therapy Daily Progress Note    Patient: Jasson Gonzalez   : 1935  Diagnosis/ICD-10 Code:  Balance problem [R26.89]  Referring practitioner: Holly Kincaid MD  Date of Initial Visit: Type: THERAPY  Noted: 10/20/2021  Today's Date: 2021  Patient seen for 9 sessions           Subjective   Patient reports he is feeling less SOB when walking.    Objective     See Exercise, Manual, and Modality Logs for complete treatment.     EXERCISES:  -Walk track 2 laps: emphasis on arm swing, heel toe gait, and upright posture. 1 seated rest break with each lap. Use of cane in L UE with education on proper gait sequencing  -1K Leg Press, 80 lbs B LE x20, S/L x20   -6K hip abduction machine, 50 lbs, 3x15  -4\" Step ups on L, 2\" Step ups on R x20 each  -Alt toe taps x20 (6\" height) *Defer  -Ankle AROM DF 20x   -Mini Squats 15x  -Walking march with emphasis on SLS along rail, 20 ft x 2 *defer  -LAQ x15 on R LE  -Heel raises x20 *Defer  -Cable side step with 7.5 lbs resistance, x5 each direction, min A for safety.  -Sit to stand no hands off elevated mat table x10    Assessment/Plan  Patient is still having trouble isolating his quads with LAQ exercise. He also still tends to compensate with excessive UE support for step ups on R. Working on increasing gait speed with walking while maintaining upright posture.          Timed:    Manual Therapy:         mins  94193;  Therapeutic Exercise:    24     mins  76346;     Neuromuscular Salvador:        mins  90692;    Therapeutic Activity:     14     mins  92422;     Gait Training:           mins  25513;     Ultrasound:          mins  14190;    Electrical Stimulation:         mins  60408 ( );  Iontophoresis         mins 54059;  Aquatic Therapy         mins 83562;  Dry Needling                   mins /  (Self-pay)    Untimed:  Electrical Stimulation:         mins  65290 ( );  Traction:         mins  14741;     Timed Treatment:   38   mins   Total " Treatment:     38   mins    Ani Mehta PT  Physical Therapist    [place] License:[id number]

## 2021-12-27 ENCOUNTER — TREATMENT (OUTPATIENT)
Dept: PHYSICAL THERAPY | Facility: CLINIC | Age: 86
End: 2021-12-27

## 2021-12-27 DIAGNOSIS — R29.6 MULTIPLE FALLS: ICD-10-CM

## 2021-12-27 DIAGNOSIS — R26.89 BALANCE PROBLEM: Primary | ICD-10-CM

## 2021-12-27 PROCEDURE — 97116 GAIT TRAINING THERAPY: CPT | Performed by: PHYSICAL THERAPIST

## 2021-12-27 PROCEDURE — 97110 THERAPEUTIC EXERCISES: CPT | Performed by: PHYSICAL THERAPIST

## 2021-12-27 PROCEDURE — 97112 NEUROMUSCULAR REEDUCATION: CPT | Performed by: PHYSICAL THERAPIST

## 2021-12-27 NOTE — PROGRESS NOTES
"Physical Therapy Daily Progress Note    Patient: Jasson Gonzalez   : 1935  Diagnosis/ICD-10 Code:  Balance problem [R26.89]  Referring practitioner: Holly Kincaid MD  Date of Initial Visit: Type: THERAPY  Noted: 10/20/2021  Today's Date: 2021  Patient seen for 10 sessions           Subjective   Jasson reports he hasn't really been walking much on his own.    Objective   See Exercise, Manual, and Modality Logs for complete treatment.     EXERCISES:  -Walk track 2 laps: emphasis on arm swing, heel toe gait, and upright posture. Use of cane in L UE with education on proper gait sequencing  -1K Leg Press, 80 lbs B LE x20, S/L x20   -6K hip abduction machine, 50 lbs, 3x15  -4\" Step ups on L, 2\" Step ups on R x20 each  -Alt toe taps x20 (6\" height)   -Ankle AROM DF 20x   -Mini Squats 15x  -Walking marches at rail x20  -LAQ x15 on R LE  -Heel raises x20   -Cable side step with 7.5 lbs resistance, x5 each direction, min A for safety.  -Sit to stand no hands off elevated mat table x10    Assessment/Plan  Jasson was able to complete both laps without any rest break today. Recommended he start walking more frequently at home to continue increasing his stamina and endurance. Slightly less hip flexion compensation noted with R LE LAQ today. Jasson also did a better job of performing true side stepping with the cable.          Timed:    Manual Therapy:         mins  74315;  Therapeutic Exercise:    22     mins  31020;     Neuromuscular Salvador:  8      mins  72669;    Therapeutic Activity:          mins  36459;     Gait Training:    10       mins  20027;     Ultrasound:          mins  06987;    Electrical Stimulation:         mins  31163 ( );  Iontophoresis         mins 50453;  Aquatic Therapy         mins 18065;  Dry Needling                   mins /  (Self-pay)    Untimed:  Electrical Stimulation:         mins  08380 ( );  Traction:         mins  42328;     Timed Treatment:   40   mins "   Total Treatment:     40   mins    Ani Mehta PT  Physical Therapist    [place] License:[id number]

## 2021-12-28 ENCOUNTER — OFFICE VISIT (OUTPATIENT)
Dept: FAMILY MEDICINE CLINIC | Facility: CLINIC | Age: 86
End: 2021-12-28

## 2021-12-28 VITALS
BODY MASS INDEX: 21.33 KG/M2 | HEART RATE: 59 BPM | OXYGEN SATURATION: 98 % | DIASTOLIC BLOOD PRESSURE: 66 MMHG | TEMPERATURE: 98 F | SYSTOLIC BLOOD PRESSURE: 114 MMHG | WEIGHT: 149 LBS | HEIGHT: 70 IN

## 2021-12-28 DIAGNOSIS — I63.511 CEREBROVASCULAR ACCIDENT (CVA) DUE TO OCCLUSION OF RIGHT MIDDLE CEREBRAL ARTERY (HCC): ICD-10-CM

## 2021-12-28 DIAGNOSIS — F02.80 LATE ONSET ALZHEIMER'S DEMENTIA WITHOUT BEHAVIORAL DISTURBANCE (HCC): Primary | ICD-10-CM

## 2021-12-28 DIAGNOSIS — G30.1 LATE ONSET ALZHEIMER'S DEMENTIA WITHOUT BEHAVIORAL DISTURBANCE (HCC): Primary | ICD-10-CM

## 2021-12-28 PROBLEM — Z96.1 PSEUDOPHAKIA: Status: ACTIVE | Noted: 2017-05-03

## 2021-12-28 PROBLEM — H26.40 SECONDARY CATARACT OF RIGHT EYE: Status: ACTIVE | Noted: 2017-09-26

## 2021-12-28 PROCEDURE — 99214 OFFICE O/P EST MOD 30 MIN: CPT | Performed by: FAMILY MEDICINE

## 2021-12-28 RX ORDER — DONEPEZIL HYDROCHLORIDE 10 MG/1
10 TABLET, FILM COATED ORAL NIGHTLY
Qty: 90 TABLET | Refills: 1 | Status: SHIPPED | OUTPATIENT
Start: 2021-12-28 | End: 2022-05-02 | Stop reason: SDUPTHER

## 2021-12-28 NOTE — PROGRESS NOTES
"Chief Complaint  Memory Loss (follow  doing wel with new pill ) and Balance Issues (probably will need  more orders for PT )    Subjective          Jasson Gonzalez presents to [facility]  History of Present Illness  Pleasant 86-year-old male here for late onset Alzheimer dementia, we started donepezil 5 mg daily at the last appointment.  He has been doing well with this with no adverse effects.  He has still been having balance issues, difficult to maintain his gait.  More frequent falls.    MRI of the brain performed November 1-20, 2021 showing small vessel disease, moderate findings.  And a late subacute to chronic infarct on the left parietal lobe on the right side.    Objective   Vital Signs:   /66   Pulse 59   Temp 98 °F (36.7 °C)   Ht 177.8 cm (70\")   Wt 67.6 kg (149 lb)   SpO2 98%   BMI 21.38 kg/m²     Physical Exam  Vitals and nursing note reviewed.   Constitutional:       General: He is not in acute distress.     Appearance: He is well-developed.   HENT:      Head: Normocephalic.      Nose: Nose normal.   Cardiovascular:      Rate and Rhythm: Normal rate and regular rhythm.      Heart sounds: Normal heart sounds. No murmur heard.      Pulmonary:      Effort: Pulmonary effort is normal. No respiratory distress.      Breath sounds: Normal breath sounds.   Musculoskeletal:         General: Normal range of motion.   Skin:     General: Skin is warm and dry.      Findings: No rash.   Neurological:      Mental Status: He is alert and oriented to person, place, and time.   Psychiatric:         Behavior: Behavior normal.         Thought Content: Thought content normal.         Judgment: Judgment normal.        Result Review :                 Assessment and Plan    Diagnoses and all orders for this visit:    1. Late onset Alzheimer's dementia without behavioral disturbance (HCC) (Primary)  Comments:  Stable, tolerating donepezil 5 mg without adverse effect.  Increase to " 10 mg.  Follow-up in 6 months.  Continue with nonpharmacologic lifestyle healthy habits  Orders:  -     donepezil (ARICEPT) 10 MG tablet; Take 1 tablet by mouth Every Night.  Dispense: 90 tablet; Refill: 1    2. Cerebrovascular accident (CVA) due to occlusion of right middle cerebral artery (HCC)  Comments:  Seen on recent MRI.  I do think this is contributing to his altered gait.  Continue with PT to decrease falls.  Continue same meds.        Follow Up   Return in about 6 months (around 6/28/2022), or if symptoms worsen or fail to improve, for Annual Exam with fasting labs prior.  Patient was given instructions and counseling regarding his condition or for health maintenance advice. Please see specific information pulled into the AVS if appropriate. Medical assistant and I wore mask and eyewear protection during entire encounter.  Patient wore mask.    Holly Kincaid MD

## 2021-12-29 ENCOUNTER — TREATMENT (OUTPATIENT)
Dept: PHYSICAL THERAPY | Facility: CLINIC | Age: 86
End: 2021-12-29

## 2021-12-29 DIAGNOSIS — R26.89 BALANCE PROBLEM: Primary | ICD-10-CM

## 2021-12-29 DIAGNOSIS — R29.6 MULTIPLE FALLS: ICD-10-CM

## 2021-12-29 PROCEDURE — 97530 THERAPEUTIC ACTIVITIES: CPT | Performed by: PHYSICAL THERAPIST

## 2021-12-29 PROCEDURE — 97110 THERAPEUTIC EXERCISES: CPT | Performed by: PHYSICAL THERAPIST

## 2021-12-29 NOTE — PROGRESS NOTES
"Physical Therapy Daily Progress Note    Patient: Jasson Gonzalez   : 1935  Diagnosis/ICD-10 Code:  Balance problem [R26.89]  Referring practitioner: Holly Kincaid MD  Date of Initial Visit: Type: THERAPY  Noted: 10/20/2021  Today's Date: 2021  Patient seen for 11 sessions           Subjective   Patient reports he saw his MD yesterday and she wants him to continue PT as much as possible.    Objective   See Exercise, Manual, and Modality Logs for complete treatment.     EXERCISES:  -Walk track 2 laps: emphasis on arm swing, heel toe gait, and upright posture. Use of cane in L UE with education on proper gait sequencing  -1K Leg Press, 80 lbs B LE x20, S/L x20   -6K hip abduction machine, 50 lbs, 3x15  -4\" Step ups on L, 2\" Step ups on R x20 each  -Alt toe taps x20 (6\" height)   -Ankle AROM DF 20x   -Mini Squats 15x  -Walking marches at rail x20  -LAQ x15 on R LE  -Heel raises x20   -Cable side step with 7.5 lbs resistance, x5 each direction, min A for safety.  -Sit to stand no hands off elevated mat table x10    Assessment/Plan  Jasson is walking with a faster gait speed but still has some feelings of shortness of breath. He also continues to go into some R knee valgus on the leg press due to R quad weakness. Encouraged patient to start considering gym member for eventual transition to [facility] but will plan to continue PT into February for now.          Timed:    Manual Therapy:         mins  76434;  Therapeutic Exercise:    32     mins  30219;     Neuromuscular Salvador:        mins  08829;    Therapeutic Activity:     8     mins  63472;     Gait Training:           mins  62662;     Ultrasound:          mins  35094;    Electrical Stimulation:         mins  74500 ( );  Iontophoresis         mins 53693;  Aquatic Therapy         mins 58635;  Dry Needling                   mins /  (Self-pay)    Untimed:  Electrical Stimulation:         mins  55092 ( );  Traction:         mins  26236; "     Timed Treatment:   40   mins   Total Treatment:     40   mins    Ani Mehta PT  Physical Therapist    [place] License:[id number]

## 2022-01-12 DIAGNOSIS — E78.5 HYPERLIPIDEMIA, UNSPECIFIED HYPERLIPIDEMIA TYPE: ICD-10-CM

## 2022-01-12 RX ORDER — ATORVASTATIN CALCIUM 20 MG/1
TABLET, FILM COATED ORAL
Qty: 90 TABLET | Refills: 1 | Status: SHIPPED | OUTPATIENT
Start: 2022-01-12 | End: 2022-07-25

## 2022-01-14 ENCOUNTER — TREATMENT (OUTPATIENT)
Dept: PHYSICAL THERAPY | Facility: CLINIC | Age: 87
End: 2022-01-14

## 2022-01-14 DIAGNOSIS — R29.6 MULTIPLE FALLS: ICD-10-CM

## 2022-01-14 DIAGNOSIS — R26.89 BALANCE PROBLEM: Primary | ICD-10-CM

## 2022-01-14 PROCEDURE — 97112 NEUROMUSCULAR REEDUCATION: CPT | Performed by: PHYSICAL THERAPIST

## 2022-01-14 PROCEDURE — 97116 GAIT TRAINING THERAPY: CPT | Performed by: PHYSICAL THERAPIST

## 2022-01-14 PROCEDURE — 97110 THERAPEUTIC EXERCISES: CPT | Performed by: PHYSICAL THERAPIST

## 2022-01-16 NOTE — PROGRESS NOTES
Physical Therapy Daily Progress Note    Patient: Jasson Gonzalez   : 1935  Diagnosis/ICD-10 Code:  Balance problem [R26.89]  Referring practitioner: Holly Kincaid MD  Date of Initial Visit: Type: THERAPY  Noted: 10/20/2021  Today's Date: 2022  Patient seen for 12 sessions           Subjective  Trying to walk better     Objective   See Exercise, Manual, and Modality Logs for complete treatment.       Assessment/Plan       progressed with quad / glute medius strengthening. Difficulty with (R) vmo activiation. Mild cuing with gait balance activities with use of spc.  Fatigued at conclusion of care.       Timed:    Manual Therapy:     mins  68307;  Therapeutic Exercise:   22      mins  65418;     Neuromuscular Salvador: 8       mins  33271;    Therapeutic Activity:          mins  79772;     Gait Training:      10   mins  85390;     Ultrasound:          mins  14000;    Electrical Stimulation:         mins  97198 ( );  Iontophoresis         mins 27330;  Aquatic Therapy         mins 97486;  Dry Needling                   mins 82558/  (Self-pay)    Untimed:  Electrical Stimulation:         mins  59870 ( );  Traction:         mins  68189;     Timed Treatment:  40    mins   Total Treatment:     40   mins    Horace Jensen PT  Physical Therapist    [place] License:[id number]

## 2022-01-18 ENCOUNTER — TELEPHONE (OUTPATIENT)
Dept: FAMILY MEDICINE CLINIC | Facility: CLINIC | Age: 87
End: 2022-01-18

## 2022-01-18 NOTE — TELEPHONE ENCOUNTER
I would be glad to recommend.  When I hear of acute worsening I do think of infections like urinary tract infections or other causes to contribute to rapid worsening of memory.  I do think we can evaluate for this in the office.    I also increased his memory medication donepezil from 5 mg to 10 mg.  It could be that the worsening confusion is possibly from too much medicine?  It may be helpful to decrease the dose back to 5 to see if this improves his symptoms as well.

## 2022-01-18 NOTE — TELEPHONE ENCOUNTER
Caller: Christiane Matthew    Relationship to patient: Emergency Contact    Best call back number: [phone]    Patient is needing: PATIENT'S DAUGHTER CALLED IN AND WANTED TO KNOW WHAT OPTIONS ARE AVAILABLE FOR MEMORY LOSS? SHE SAID THAT HER FATHER WILL WAKE UP ONE DAY FINE AND THE NEXT DAY HE CAN NOT REMEMBER SIMPLE TASKS OR WHERE THE FRONT DOOR IS. SHE SAID THAT HE SEEMS TO BE DECLINING QUICKLY WITH HIS MEMORY SINCE January 1ST. SHE SAID HE HAS HAD AN MRI OF HIS BRAIN AND THERE WAS PROOF OF A STROKE, BUT THE PATIENT DOES NOT REMEMBER ANY INFORMATION ABOUT THIS. SHE WANTED TO KNOW IF SHE SHOULD SCHEDULE AN APPOINTMENT OR NOT? PLEASE CALL AND ADVISE

## 2022-01-21 DIAGNOSIS — G30.1 LATE ONSET ALZHEIMER'S DEMENTIA WITHOUT BEHAVIORAL DISTURBANCE: Primary | ICD-10-CM

## 2022-01-21 DIAGNOSIS — F02.80 LATE ONSET ALZHEIMER'S DEMENTIA WITHOUT BEHAVIORAL DISTURBANCE: Primary | ICD-10-CM

## 2022-01-21 NOTE — TELEPHONE ENCOUNTER
Tia left message she don't feel is nothing acute like uti  But she want referral for neuro doctor to check for dementia or Alzheimer  He is taking just 5mg  No 10 mg will keep an eye if get worse will go er to check uti

## 2022-01-24 ENCOUNTER — TELEPHONE (OUTPATIENT)
Dept: PHYSICAL THERAPY | Facility: CLINIC | Age: 87
End: 2022-01-24

## 2022-02-07 ENCOUNTER — TREATMENT (OUTPATIENT)
Dept: PHYSICAL THERAPY | Facility: CLINIC | Age: 87
End: 2022-02-07

## 2022-02-07 DIAGNOSIS — R26.89 BALANCE PROBLEM: Primary | ICD-10-CM

## 2022-02-07 DIAGNOSIS — R29.6 MULTIPLE FALLS: ICD-10-CM

## 2022-02-07 PROCEDURE — 97112 NEUROMUSCULAR REEDUCATION: CPT | Performed by: PHYSICAL THERAPIST

## 2022-02-07 PROCEDURE — 97110 THERAPEUTIC EXERCISES: CPT | Performed by: PHYSICAL THERAPIST

## 2022-02-07 PROCEDURE — 97530 THERAPEUTIC ACTIVITIES: CPT | Performed by: PHYSICAL THERAPIST

## 2022-02-07 NOTE — PROGRESS NOTES
"Physical Therapy Recertification       Patient: Jasson Gonzalez   : 1935  Diagnosis/ICD-10 Code:  Balance problem [R26.89]  Referring practitioner: Holly Kincaid MD  Date of Initial Visit: 2022  Today's Date: 2022  Patient seen for 13 sessions           Subjective   Patient reports he feels more steady since initiating therapy    Objective     Strength/Myotome Testing      Left Hip   Planes of Motion   Flexion: 4+     Right Hip   Planes of Motion   Flexion: 4-     Left Knee   Flexion: 4+  Extension: 4+     Right Knee   Flexion: 4  Extension: 3     Left Ankle/Foot   Dorsiflexion: 3     Right Ankle/Foot   Dorsiflexion: 3-     Ambulation      Comments   Wide MARGO, steppage gait due to lack of DF AROM for foot clearance, forward trunk lean, B foot eversion     Functional Assessment   -SEE ROSE  -Relies on hands off knees for sit to stand transfer      See Exercise, Manual, and Modality Logs for complete treatment.      Functional Outcome Score: ROSE = 23; 5xSTS 12.2 sec (hands pushing off knee slightly ; off chair)    EXERCISES:  -Walk track 2 laps: emphasis on arm swing, heel toe gait, and upright posture. Use of cane in L UE with education on proper gait sequencing  -1K Leg Press, 80 lbs B LE x20, S/L x20   -6K hip abduction machine, 50 lbs, 3x15  -4\" Step ups on L, 2\" Step ups on R x20 each  -Alt toe taps x20 (6\" height)  *defer  -Ankle AROM DF 20x   -Mini Squats 15x  -Walking marches at rail x20  -LAQ x15 on R LE  -Heel raises x20   -Cable side step with 7.5 lbs resistance, x3 each direction, min A for safety.  -Sit to stand no hands off elevated mat table x10        Assessment/Plan    Jasson Gonzalez has been seen for 13 physical therapy sessions for balance training due to multiple falls.  Treatment has included therapeutic exercise, therapeutic activity, gait training and neuro-muscular retraining. Progress to physical therapy goals is good. Patient demonstrates better stability walking track " but is still limited due to poor stamina. He still is much weaker in his R LE compared to the L and he favor his L side significant with transfers. He is using the cane with community mobility but not around the house. He will benefit from continued skilled physical therapy to address remaining impairments and functional limitations.                 Short Term Goals for completion in 30 days:   -Patient will report compliance and independence with initial HEP (MET)  -Patient will improve ability to complete 6 alternating toe taps to increase LE coordination (ONGOING- needs UE support)    LTGs for completion within 90 days:  -Patient will demonstrate sit to stand without use of hands in order to increase functional strength (MET)  -Patient will increase ROSE from 23 to 30 to reduce risk of falls (ONGOING)  -Patient will improve 5xSTS from 13.1 seconds to </=11 seconds in order to decrease risk of falls and increase functional strength (ONGOING)  -Patient will increase LE knee extension strength to 4-/5 or greater to increase LE stability during gait (ONGOING)        Recommendations: Continue as planned  Timeframe: 1 month; 2x/week  Prognosis to achieve goals: good         Timed:  Manual Therapy:         mins  98202;  Therapeutic Exercise:    22     mins  40136;     Neuromuscular Salvador:    10    mins  35336;    Therapeutic Activity:     8     mins  17029;     Gait Training:           mins  61925;     Ultrasound:          mins  89417;    Electrical Stimulation:         mins  16018 ( );  Iontophoresis         mins 71976  Dry Needling        mins 20560/ 20561 (Self-pay)      Untimed:  Electrical Stimulation:         mins  50519 (MC );  Mechanical Traction:         mins  35556;     Timed Treatment:   40   mins   Total Treatment:     40   mins    PT SIGNATURE: Ani Mehta PT     License Number: [id number]    Electronically signed by Ani Mehta PT, 02/07/22, 1:17 PM EST]    DATE TREATMENT INITIATED:  2/7/2022    90 Day Recertification  Certification Period: 5/8/2022  I certify that the therapy services are furnished while this patient is under my care.  The services outlined above are required by this patient, and will be reviewed every 90 days.     PHYSICIAN: Holly Kincaid MD   NPI: [id number]                                         DATE:

## 2022-02-09 ENCOUNTER — TREATMENT (OUTPATIENT)
Dept: PHYSICAL THERAPY | Facility: CLINIC | Age: 87
End: 2022-02-09

## 2022-02-09 DIAGNOSIS — R26.89 BALANCE PROBLEM: Primary | ICD-10-CM

## 2022-02-09 DIAGNOSIS — R29.6 MULTIPLE FALLS: ICD-10-CM

## 2022-02-09 PROCEDURE — 97110 THERAPEUTIC EXERCISES: CPT | Performed by: PHYSICAL THERAPIST

## 2022-02-09 PROCEDURE — 97530 THERAPEUTIC ACTIVITIES: CPT | Performed by: PHYSICAL THERAPIST

## 2022-02-09 NOTE — PROGRESS NOTES
"Physical Therapy Daily Progress Note    Patient: Jasson Gonzalez   : 1935  Diagnosis/ICD-10 Code:  Balance problem [R26.89]  Referring practitioner: Holly Kincaid MD  Date of Initial Visit: Type: THERAPY  Noted: 10/20/2021  Today's Date: 2022  Patient seen for 14 sessions           Subjective   Patient reports he feels like his stamina is better when he comes to PT consistently    Objective   See Exercise, Manual, and Modality Logs for complete treatment.     EXERCISES:  -Walk track 2 laps: emphasis on arm swing, heel toe gait, and upright posture. Use of cane in L UE with education on proper gait sequencing  -1K Leg Press, 80 lbs B LE x20, S/L x20   -6K hip abduction machine, 50 lbs, 3x15  -4\" Step ups on L, 2\" Step ups on R x20 each  -Alt toe taps x20 (6\" height)  *defer  -Ankle AROM DF 20x   -Mini Squats 15x  -Walking marches at rail x20  -LAQ x15 on R LE  -Heel raises x20   -Cable side step with 7.5 lbs resistance, x3 each direction, min A for safety.  -Sit to stand no hands off elevated mat table x10    Assessment/Plan  Patient is still unable to perform LAQ without significant compensation from hip flexors. He does better with active DF when his heel is planted on the ground. Still a lot of UE compensation noted with step ups as well. Gait speed slowly improving and patient is relatively more steady.         Timed:    Manual Therapy:         mins  53872;  Therapeutic Exercise:    30     mins  06335;     Neuromuscular Salvador:        mins  68468;    Therapeutic Activity:     10     mins  99491;     Gait Training:           mins  15195;     Ultrasound:          mins  02177;    Electrical Stimulation:         mins  20747 ( );  Iontophoresis         mins 62183;  Aquatic Therapy         mins 75774;  Dry Needling                   mins /  (Self-pay)    Untimed:  Electrical Stimulation:         mins  78394 ( );  Traction:         mins  48844;     Timed Treatment:   40   mins "   Total Treatment:     40   mins    Ani Mehta PT  Physical Therapist    [place] License:[id number]

## 2022-02-14 ENCOUNTER — TREATMENT (OUTPATIENT)
Dept: PHYSICAL THERAPY | Facility: CLINIC | Age: 87
End: 2022-02-14

## 2022-02-14 DIAGNOSIS — R26.89 BALANCE PROBLEM: ICD-10-CM

## 2022-02-14 DIAGNOSIS — R29.6 MULTIPLE FALLS: Primary | ICD-10-CM

## 2022-02-14 PROCEDURE — 97530 THERAPEUTIC ACTIVITIES: CPT | Performed by: PHYSICAL THERAPIST

## 2022-02-14 PROCEDURE — 97110 THERAPEUTIC EXERCISES: CPT | Performed by: PHYSICAL THERAPIST

## 2022-02-14 PROCEDURE — 97112 NEUROMUSCULAR REEDUCATION: CPT | Performed by: PHYSICAL THERAPIST

## 2022-02-14 NOTE — PROGRESS NOTES
"Physical Therapy Daily Progress Note    Patient: Jasson Gonzalez   : 1935  Diagnosis/ICD-10 Code:  Multiple falls [R29.6]  Referring practitioner: Holly Kincaid MD  Date of Initial Visit: Type: THERAPY  Noted: 10/20/2021  Today's Date: 2022  Patient seen for 15 sessions           Subjective     Objective     See Exercise, Manual, and Modality Logs for complete treatment.     EXERCISES:  -Walk track 2 laps: emphasis on arm swing, heel toe gait, and upright posture. Use of cane in L UE with education on proper gait sequencing  -1K Leg Press, 80 lbs B LE x20, S/L x20   -6K hip abduction machine, 50 lbs, 3x15  -4\" Step ups on L, 2\" Step ups on R x20 each  -Alt toe taps x20 (6\" height)  *defer  -Ankle AROM DF 20x   -Mini Squats 15x  -Walking marches at rail x20  -LAQ x15 on R LE  -Heel raises x20   -Cable side step with 7.5 lbs resistance, x3 each direction, min A for safety.  -Sit to stand no hands off elevated mat table x10    Assessment/Plan  Patient still having trouble maintaining upright posture while performing side stepping. Also relying on both cane and railing during walking marches therefore trying to lessen UE support to further challenge balance.          Timed:    Manual Therapy:         mins  76717;  Therapeutic Exercise:    21     mins  43279;     Neuromuscular Salvador:  10      mins  11553;    Therapeutic Activity:     9     mins  97239;     Gait Training:           mins  04543;     Ultrasound:          mins  00532;    Electrical Stimulation:         mins  40369 ( );  Iontophoresis         mins 84006;  Aquatic Therapy         mins 95476;  Dry Needling                   mins 52858/  (Self-pay)    Untimed:  Electrical Stimulation:         mins  50087 ( );  Traction:         mins  13883;     Timed Treatment:   40   mins   Total Treatment:     40   mins    Ani Mehta PT  Physical Therapist    [place] License:[id number]  "

## 2022-02-16 ENCOUNTER — TREATMENT (OUTPATIENT)
Dept: PHYSICAL THERAPY | Facility: CLINIC | Age: 87
End: 2022-02-16

## 2022-02-16 DIAGNOSIS — R29.6 MULTIPLE FALLS: ICD-10-CM

## 2022-02-16 DIAGNOSIS — R26.89 BALANCE PROBLEM: Primary | ICD-10-CM

## 2022-02-16 PROCEDURE — 97110 THERAPEUTIC EXERCISES: CPT | Performed by: PHYSICAL THERAPIST

## 2022-02-16 PROCEDURE — 97530 THERAPEUTIC ACTIVITIES: CPT | Performed by: PHYSICAL THERAPIST

## 2022-02-16 PROCEDURE — 97116 GAIT TRAINING THERAPY: CPT | Performed by: PHYSICAL THERAPIST

## 2022-02-16 NOTE — PROGRESS NOTES
"Physical Therapy Daily Progress Note    Patient: Jasson Gonzalez   : 1935  Diagnosis/ICD-10 Code:  Balance problem [R26.89]  Referring practitioner: Holly Kinacid MD  Date of Initial Visit: Type: THERAPY  Noted: 10/20/2021  Today's Date: 2022  Patient seen for 16 sessions           Subjective   Jasson reports he feels like his R knee is more stable but he still has trouble activating the muscles.    Objective     See Exercise, Manual, and Modality Logs for complete treatment.       EXERCISES:  -Walk track 2 laps: emphasis on arm swing, heel toe gait, and upright posture. Use of cane in L UE with education on proper gait sequencing  -1K Leg Press, 80 lbs B LE x20, S/L x20   -6K hip abduction machine, 50 lbs, 3x15  -4\" Step ups on L, 2\" Step ups on R x20 each  -Alt toe taps x20 (4\" height)   -Ankle AROM DF 20x   -Mini Squats 15x  -Walking marches at rail x20  -LAQ x15 on R LE  -Heel raises x20   -Cable side step with 7.5 lbs resistance, x3 each direction, min A for safety.  -Sit to stand no hands off elevated mat table x10       Assessment/Plan  Focused on increasing hip/knee drive with walking marches. Continues to utilize tactile cueing in an attempt to isolate quad activation with seated LAQ (still excessive compensation from hip flexor activation. Knee control is better on single leg press (with L LE) however muscle fatigue is still a big limitation.          Timed:    Manual Therapy:         mins  53434;  Therapeutic Exercise:    24     mins  84181;     Neuromuscular Salvador:        mins  17245;    Therapeutic Activity:     8     mins  74599;     Gait Trainin       mins  33637;     Ultrasound:          mins  96652;    Electrical Stimulation:         mins  70932 ( );  Iontophoresis         mins 07783;  Aquatic Therapy         mins 61054;  Dry Needling                   mins /  (Self-pay)    Untimed:  Electrical Stimulation:         mins  20742 ( );  Traction:         " mins  71784;     Timed Treatment:   40   mins   Total Treatment:     40   mins    Ani Mehta PT  Physical Therapist    [place] License:[id number]

## 2022-02-21 ENCOUNTER — TREATMENT (OUTPATIENT)
Dept: PHYSICAL THERAPY | Facility: CLINIC | Age: 87
End: 2022-02-21

## 2022-02-21 DIAGNOSIS — R26.89 BALANCE PROBLEM: ICD-10-CM

## 2022-02-21 DIAGNOSIS — R29.6 MULTIPLE FALLS: Primary | ICD-10-CM

## 2022-02-21 PROCEDURE — 97530 THERAPEUTIC ACTIVITIES: CPT | Performed by: PHYSICAL THERAPIST

## 2022-02-21 PROCEDURE — 97110 THERAPEUTIC EXERCISES: CPT | Performed by: PHYSICAL THERAPIST

## 2022-02-21 NOTE — PROGRESS NOTES
"Physical Therapy Daily Progress Note    Patient: Jasson Gonzalez   : 1935  Diagnosis/ICD-10 Code:  Multiple falls [R29.6]  Referring practitioner: Holly Kincaid MD  Date of Initial Visit: Type: THERAPY  Noted: 10/20/2021  Today's Date: 2022  Patient seen for 17 sessions           Subjective   Jasson reports he can tell his stamina is a little better from therapy.    Objective   See Exercise, Manual, and Modality Logs for complete treatment.     EXERCISES:  -Walk track 2 laps: emphasis on arm swing, heel toe gait, and upright posture. Use of cane in L UE with education on proper gait sequencing  -1K Leg Press, 80 lbs B LE x20, S/L x20   -6K hip abduction machine, 50 lbs, 3x15  -4\" Step ups on L, 2\" Step ups on R x20 each  -Alt toe taps x20 (4\" height)   -Ankle AROM DF 20x   -Mini Squats 15x  -Walking marches at rail x20  -LAQ x15 on R LE  -Heel raises x20   -Cable side step with 7.5 lbs resistance, x3 each direction, min A for safety.  -Sit to stand no hands off elevated mat table x10       Assessment/Plan  Jasson is still very forward flexed with gait. Working on trying to promote more upright posture with visual cues. Also still working on quad isolation; He may benefit from NMES to target quads since his hip flexors tend to co-contract when he performs LAQs.          Timed:    Manual Therapy:         mins  81818;  Therapeutic Exercise:    32     mins  28517;     Neuromuscular Salvador:        mins  45686;    Therapeutic Activity:     8     mins  73167;     Gait Training:           mins  11846;     Ultrasound:          mins  75695;    Electrical Stimulation:         mins  74114 ( );  Iontophoresis         mins 71739;  Aquatic Therapy         mins 66017;  Dry Needling                   mins 96656/  (Self-pay)    Untimed:  Electrical Stimulation:         mins  18956 ( );  Traction:         mins  51435;     Timed Treatment:   40   mins   Total Treatment:     40   mins    Ani Mehta " PT  Physical Therapist    [place] License:[id number]

## 2022-02-23 ENCOUNTER — TREATMENT (OUTPATIENT)
Dept: PHYSICAL THERAPY | Facility: CLINIC | Age: 87
End: 2022-02-23

## 2022-02-23 DIAGNOSIS — R26.89 BALANCE PROBLEM: ICD-10-CM

## 2022-02-23 DIAGNOSIS — I10 HYPERTENSION, ESSENTIAL: ICD-10-CM

## 2022-02-23 DIAGNOSIS — R29.6 MULTIPLE FALLS: Primary | ICD-10-CM

## 2022-02-23 PROCEDURE — 97530 THERAPEUTIC ACTIVITIES: CPT | Performed by: PHYSICAL THERAPIST

## 2022-02-23 PROCEDURE — 97116 GAIT TRAINING THERAPY: CPT | Performed by: PHYSICAL THERAPIST

## 2022-02-23 PROCEDURE — 97110 THERAPEUTIC EXERCISES: CPT | Performed by: PHYSICAL THERAPIST

## 2022-02-23 RX ORDER — RAMIPRIL 10 MG/1
CAPSULE ORAL
Qty: 90 CAPSULE | Refills: 1 | Status: SHIPPED | OUTPATIENT
Start: 2022-02-23

## 2022-02-23 NOTE — PROGRESS NOTES
"Physical Therapy Daily Progress Note    Patient: Jasson Gonzalez   : 1935  Diagnosis/ICD-10 Code:  Multiple falls [R29.6]  Referring practitioner: Holly Kincaid MD  Date of Initial Visit: Type: THERAPY  Noted: 10/20/2021  Today's Date: 2022  Patient seen for 18 sessions           Subjective   Jasson reports he is feeling \"okay.\"     Objective   See Exercise, Manual, and Modality Logs for complete treatment.        EXERCISES:  -Walk track 2 laps: emphasis on arm swing, heel toe gait, and upright posture. U se of cane in L UE with education on proper gait sequencing  -1K Leg Press, 80 lbs B LE x20, S/L x20   -6K hip abduction machine, 50 lbs, 3x15  -4\" Step ups on L, 2\" Step ups on R x20 each  -Alt toe taps x20 (4\" height)   -Ankle AROM DF 20x   -Mini Squats 15x  -Walking marches at rail x20  -LAQ x15 on R LE  -Heel raises x20   -Cable side step with 7.5 lbs resistance, x3 each direction, min A for safety.  -Sit to stand no hands off elevated mat table x10       Assessment/Plan  Jasson is still limited in activity tolerance, both by feeling SOB and due to LE weakness. Also still significant compensation noted with UE support during step ups on both legs. Still focusing on increasing strength to improve functional mobility and overall balance. Also working on improving balance with frontal plane movements as he tends to compensate his foot positioning with cable side steps.         Timed:    Manual Therapy:         mins  05815;  Therapeutic Exercise:    24     mins  79212;     Neuromuscular Salvador:        mins  92456;    Therapeutic Activity:     8     mins  34236;     Gait Trainin     mins  67449;     Ultrasound:          mins  19808;    Electrical Stimulation:         mins  91132 ( );  Iontophoresis         mins 30541;  Aquatic Therapy         mins 58020;  Dry Needling                   mins /  (Self-pay)    Untimed:  Electrical Stimulation:         mins  47314 ( " );  Traction:         mins  58814;     Timed Treatment:   40   mins   Total Treatment:     40   mins    Ani Mehta PT  Physical Therapist    [place] License:[id number]

## 2022-02-28 ENCOUNTER — TREATMENT (OUTPATIENT)
Dept: PHYSICAL THERAPY | Facility: CLINIC | Age: 87
End: 2022-02-28

## 2022-02-28 DIAGNOSIS — R26.89 BALANCE PROBLEM: ICD-10-CM

## 2022-02-28 DIAGNOSIS — R29.6 MULTIPLE FALLS: Primary | ICD-10-CM

## 2022-02-28 PROCEDURE — 97110 THERAPEUTIC EXERCISES: CPT | Performed by: PHYSICAL THERAPIST

## 2022-02-28 PROCEDURE — 97116 GAIT TRAINING THERAPY: CPT | Performed by: PHYSICAL THERAPIST

## 2022-02-28 PROCEDURE — 97530 THERAPEUTIC ACTIVITIES: CPT | Performed by: PHYSICAL THERAPIST

## 2022-02-28 NOTE — PROGRESS NOTES
"Physical Therapy Daily Progress Note    Patient: Jasson Gonzalez   : 1935  Diagnosis/ICD-10 Code:  Multiple falls [R29.6]  Referring practitioner: Holly Kincaid MD  Date of Initial Visit: Type: THERAPY  Noted: 10/20/2021  Today's Date: 2022  Patient seen for 19 sessions           Subjective   Patient reports he is not sure when his R LE became weak, could be related to prior stroke but he never had rehab for it.    Objective   See Exercise, Manual, and Modality Logs for complete treatment.     EXERCISES:  -Walk track 2 laps: emphasis on arm swing, heel toe gait, and upright posture. Use of cane in L UE with education on proper gait sequencing  -1K Leg Press, 80 lbs B LE x20, S/L x20   -6K hip abduction machine, 50 lbs, 3x15  -4\" Step ups on L, 2\" Step ups on R x20 each  -Alt toe taps x20 (4\" height) *defer  -Ankle AROM DF 20x   -Mini Squats 15x  -Walking marches at rail x20  -LAQ x15 on R LE  -Heel raises x20   -Cable side step with 7.5 lbs resistance, x3 each direction, min A for safety.  -Sit to stand no hands off elevated mat table x10    Assessment/Plan  Patient needed to break up R LE leg press into 3 sets due to muscle fatigue. Still heavily leaning forward with cable side stepping. Also trying to less UE reliance on transfers and step ups. Working on disengaging hip flexor activation during LAQs.         Timed:    Manual Therapy:         mins  60290;  Therapeutic Exercise:    30     mins  23156;     Neuromuscular Salvador:        mins  46843;    Therapeutic Activity:     9     mins  94910;     Gait Trainin      mins  70392;     Ultrasound:          mins  12590;    Electrical Stimulation:         mins  00443 ( );  Iontophoresis         mins 56128;  Aquatic Therapy         mins 37434;  Dry Needling                   mins /  (Self-pay)    Untimed:  Electrical Stimulation:         mins  26363 ( );  Traction:         mins  07118;     Timed Treatment:   40   mins   Total " Treatment:     40   mins    Ani Mehta PT  Physical Therapist    [place] License:[id number]

## 2022-03-02 ENCOUNTER — TREATMENT (OUTPATIENT)
Dept: PHYSICAL THERAPY | Facility: CLINIC | Age: 87
End: 2022-03-02

## 2022-03-02 DIAGNOSIS — R26.89 BALANCE PROBLEM: Primary | ICD-10-CM

## 2022-03-02 DIAGNOSIS — R29.6 MULTIPLE FALLS: ICD-10-CM

## 2022-03-02 PROCEDURE — 97530 THERAPEUTIC ACTIVITIES: CPT | Performed by: PHYSICAL THERAPIST

## 2022-03-02 PROCEDURE — 97116 GAIT TRAINING THERAPY: CPT | Performed by: PHYSICAL THERAPIST

## 2022-03-02 PROCEDURE — 97110 THERAPEUTIC EXERCISES: CPT | Performed by: PHYSICAL THERAPIST

## 2022-03-02 NOTE — PROGRESS NOTES
"Physical Therapy Daily Progress Note    Patient: Jasson Gonzalez   : 1935  Diagnosis/ICD-10 Code:  Balance problem [R26.89]  Referring practitioner: Holly Kincaid MD  Date of Initial Visit: Type: THERAPY  Noted: 10/20/2021  Today's Date: 3/2/2022  Patient seen for 20 sessions           Subjective   Patient reports he feels like his stamina is improving.    Objective     See Exercise, Manual, and Modality Logs for complete treatment.        EXERCISES:  -Walk track 2 laps: emphasis on arm swing, heel toe gait, and upright posture. Use of cane in L UE with education on proper gait sequencing  -1K Leg Press, 80 lbs B LE x20, S/L x20   -6K hip abduction machine, 50 lbs, 3x15  -4\" Step ups on L, 2\" Step ups on R x20 each  -Alt toe taps x20 (4\" height) *defer  -Ankle AROM DF 20x   -Mini Squats 15x  -Walking marches at rail x20  -LAQ x15 on R LE  -Heel raises x20   -Cable side step with 7.5 lbs resistance, x3 each direction, min A for safety.  -Sit to stand no hands off elevated mat table x10    Assessment/Plan  Still significant UE compensation with step ups. Patient is also relying on UE support to push off mat table for sit to stand transfers. Worked on driving hips back during eccentric phase of squats and squeezing glutes during concentric phase.          Timed:    Manual Therapy:         mins  92399;  Therapeutic Exercise:    22     mins  99366;     Neuromuscular Salvador:        mins  18723;    Therapeutic Activity:     10     mins  78988;     Gait Trainin     mins  66993;     Ultrasound:          mins  42013;    Electrical Stimulation:         mins  34313 ( );  Iontophoresis         mins 98962;  Aquatic Therapy         mins 94447;  Dry Needling                   mins /  (Self-pay)    Untimed:  Electrical Stimulation:         mins  65098 ( );  Traction:         mins  04164;     Timed Treatment:   40   mins   Total Treatment:     40   mins    Ani Mehta PT  Physical " Therapist    [place] License:[id number]

## 2022-03-23 ENCOUNTER — TREATMENT (OUTPATIENT)
Dept: PHYSICAL THERAPY | Facility: CLINIC | Age: 87
End: 2022-03-23

## 2022-03-23 DIAGNOSIS — R26.89 BALANCE PROBLEM: Primary | ICD-10-CM

## 2022-03-23 DIAGNOSIS — R29.6 MULTIPLE FALLS: ICD-10-CM

## 2022-03-23 PROCEDURE — 97530 THERAPEUTIC ACTIVITIES: CPT | Performed by: PHYSICAL THERAPIST

## 2022-03-23 PROCEDURE — 97112 NEUROMUSCULAR REEDUCATION: CPT | Performed by: PHYSICAL THERAPIST

## 2022-03-23 PROCEDURE — 97110 THERAPEUTIC EXERCISES: CPT | Performed by: PHYSICAL THERAPIST

## 2022-03-23 NOTE — PROGRESS NOTES
"30-Day / 10-Visit Progress Note         Patient: Jasson Gonzalez   : 1935  Diagnosis/ICD-10 Code:  Balance problem [R26.89]  Referring practitioner: Holly Kincaid MD  Date of Initial Visit: Type: THERAPY  Noted: 10/20/2021  Today's Date: 3/23/2022  Patient seen for 21 sessions      Subjective:     Clinical Progress: improved  Home Program Compliance: Yes  Treatment has included:  therapeutic exercise, therapeutic activity, neuro-muscular retraining  and gait training    Subjective   Patient reports he has been trying to ride the bike at home but he hasn't been walking a lot.     Objective     Strength/Myotome Testing      Left Hip   Planes of Motion   Flexion: 4+     Right Hip   Planes of Motion   Flexion: 4-     Left Knee   Flexion: 4+  Extension: 4+     Right Knee   Flexion: 4-  Extension: 3-     Left Ankle/Foot   Dorsiflexion: 3     Right Ankle/Foot   Dorsiflexion: 3-     Ambulation      Comments   Wide MARGO, steppage gait due to lack of DF AROM for foot clearance, forward trunk lean, B foot eversion     Functional Assessment   -SEE ROSE  -Relies on hands off knees or chair armrests for sit to stand transfer      See Exercise, Manual, and Modality Logs for complete treatment.      Functional Outcome Score: ROSE = 25; 5xSTS 15.6sec (hands pushing off chair)    EXERCISES:  -Walk track 2 laps: emphasis on arm swing, heel toe gait, and upright posture. Use of cane in L UE with education on proper gait sequencing  -1K Leg Press, 80 lbs B LE x20, S/L x20   -6K hip abduction machine, 50 lbs, 3x15  -4\" Step ups on L, 2\" Step ups on R x20 each  -Alt toe taps x20 (4\" height) *defer  -Ankle AROM DF 20x   *defer  -Mini Squats 15x    -Walking marches at rail x20    -LAQ x15 on R LE  *defer  -Heel raises x20 *defer  -Cable side step with 7.5 lbs resistance, x3 each direction, min A for safety.  -Sit to stand no hands off elevated mat table x10      Assessment/Plan  Jasson Gonzalez has been seen for 21 physical " therapy sessions for balance training due to multiple falls.  Treatment has included therapeutic exercise, therapeutic activity, gait training and neuro-muscular retraining. Progress to physical therapy goals is good. Patient continues to struggle with stamina with walking and it is noticeably worse after he takes 1-2 weeks of therapy (due to scheduling issues). Still significantly more R LE weakness compared to L and functional strength was slightly worse with 5xSTS test today. Two point improvement noted with ROSE score with the biggest challenge on the test being tandem stance and SLS. He will benefit from continued skilled physical therapy to address remaining impairments and functional limitations.                 Short Term Goals for completion in 30 days:   -Patient will report compliance and independence with initial HEP (MET)  -Patient will improve ability to complete 6 alternating toe taps to increase LE coordination (ONGOING- needs UE support)    LTGs for completion within 90 days:  -Patient will demonstrate sit to stand without use of hands in order to increase functional strength (ONGOING)  -Patient will increase ROSE from 23 to 30 to reduce risk of falls (ONGOING)  -Patient will improve 5xSTS from 13.1 seconds to </=11 seconds in order to decrease risk of falls and increase functional strength (ONGOING)  -Patient will increase LE knee extension strength to 4-/5 or greater to increase LE stability during gait (ONGOING)        Recommendations: Continue as planned  Timeframe: 1 month; 2x/week  Prognosis to achieve goals: good    PT Signature: CHELLY Chow License Number: [id number]    Electronically signed by Ani Mehta PT, 03/23/22, 11:41 AM EDT      Based upon review of the patient's progress and continued therapy plan, it is my medical opinion that Jasson Gonzalez should continue physical therapy treatment at [facility]  [address] [place] [address]  [address] [place] [address]  [phone].    Signature: __________________________________  Holly Kincaid MD    Timed:  Manual Therapy:         mins  35301;  Therapeutic Exercise:    20     mins  27045;     Neuromuscular Salvador:    10    mins  16338;    Therapeutic Activity:     10     mins  16580;     Gait Training:           mins  41254;     Ultrasound:          mins  27122;    Iontophoresis         mins 37563;  Dry Needling                   mins [id number]/[id number] (Self-pay)    Untimed:  Electrical Stimulation:         mins  02400 ( );  Traction:         mins  90395;     Timed Treatment:   40   mins   Total Treatment:     40   mins

## 2022-03-25 ENCOUNTER — TREATMENT (OUTPATIENT)
Dept: PHYSICAL THERAPY | Facility: CLINIC | Age: 87
End: 2022-03-25

## 2022-03-25 DIAGNOSIS — R26.89 BALANCE PROBLEM: Primary | ICD-10-CM

## 2022-03-25 DIAGNOSIS — R29.6 MULTIPLE FALLS: ICD-10-CM

## 2022-03-25 PROCEDURE — 97530 THERAPEUTIC ACTIVITIES: CPT | Performed by: PHYSICAL THERAPIST

## 2022-03-25 PROCEDURE — 97112 NEUROMUSCULAR REEDUCATION: CPT | Performed by: PHYSICAL THERAPIST

## 2022-03-25 PROCEDURE — 97110 THERAPEUTIC EXERCISES: CPT | Performed by: PHYSICAL THERAPIST

## 2022-03-25 NOTE — PROGRESS NOTES
"Physical Therapy Daily Progress Note    Patient: Jasson Gonzalez   : 1935  Diagnosis/ICD-10 Code:  Balance problem [R26.89]  Referring practitioner: Holly Kincaid MD  Date of Initial Visit: Type: THERAPY  Noted: 10/20/2021  Today's Date: 3/25/2022  Patient seen for 22 sessions           Subjective   Jasson reports he still hasn't been walking at home.    Objective     See Exercise, Manual, and Modality Logs for complete treatment.     EXERCISES:  -Walk track 2 laps: emphasis on arm swing, heel toe gait, and upright posture. Use of cane in L UE with education on proper gait sequencing  -1K Leg Press, 80 lbs B LE x20, S/L x20   -6K hip abduction machine, 50 lbs, 3x15  -4\" Step ups on L, 2\" Step ups on R x20 each  -Alt toe taps x20 (4\" height) *defer  -Ankle AROM DF 20x   *defer  -Mini Squats 15x    -Walking marches at rail x20    -LAQ x15 on R LE  *defer  -Heel raises x20 *defer  -Cable side step with 7.5 lbs resistance, x3 each direction, min A for safety.  -Sit to stand no hands off elevated mat table x10       Assessment/Plan  Although he rides his recumbent bike at home discussed importance of Jasson walking daily for specificity training to improve both stamina and balance. He was able to tolerate his first walking lap with only a standing rest break but required more intermittent seated rest breaks throughout the session due to fatigue. Still working on postural awareness with standing/walking as well, especially during cable side steps.         Timed:    Manual Therapy:         mins  35757;  Therapeutic Exercise:    20     mins  31244;     Neuromuscular Salvador:    10    mins  52495;    Therapeutic Activity:     10     mins  60340;     Gait Training:           mins  01443;     Ultrasound:          mins  02499;    Electrical Stimulation:         mins  85809 ( );  Iontophoresis         mins 28120;  Aquatic Therapy         mins 28107;  Dry Needling                   mins /  " (Self-pay)    Untimed:  Electrical Stimulation:         mins  04173 ( );  Traction:         mins  75394;     Timed Treatment:   40   mins   Total Treatment:     40   mins    Ani Mehta PT  Physical Therapist    [place] License:[id number]

## 2022-04-01 ENCOUNTER — TREATMENT (OUTPATIENT)
Dept: PHYSICAL THERAPY | Facility: CLINIC | Age: 87
End: 2022-04-01

## 2022-04-01 DIAGNOSIS — R26.89 BALANCE PROBLEM: Primary | ICD-10-CM

## 2022-04-01 DIAGNOSIS — R29.6 MULTIPLE FALLS: ICD-10-CM

## 2022-04-01 PROCEDURE — 97110 THERAPEUTIC EXERCISES: CPT | Performed by: PHYSICAL THERAPIST

## 2022-04-01 PROCEDURE — 97116 GAIT TRAINING THERAPY: CPT | Performed by: PHYSICAL THERAPIST

## 2022-04-01 PROCEDURE — 97530 THERAPEUTIC ACTIVITIES: CPT | Performed by: PHYSICAL THERAPIST

## 2022-04-01 NOTE — PROGRESS NOTES
"Physical Therapy Daily Progress Note    Patient: Jasson Gonzalez   : 1935  Diagnosis/ICD-10 Code:  Balance problem [R26.89]  Referring practitioner: Holly Kincaid MD  Date of Initial Visit: Type: THERAPY  Noted: 10/20/2021  Today's Date: 2022  Patient seen for 23 sessions           Subjective   Jasson reports he still hasn't been walking to his mailbox at home, the weather has been too cold    Objective     See Exercise, Manual, and Modality Logs for complete treatment.     EXERCISES:  -Walk track 2 laps: emphasis on arm swing, heel toe gait, and upright posture. Use of cane in L UE with education on proper gait sequencing  -1K Leg Press, 80 lbs B LE x20, S/L x20   -6K hip abduction machine, 50 lbs, 3x15  -4\" Step ups on L, 2\" Step ups on R x20 each  -Alt toe taps x20 (4\" height) *defer  -Ankle AROM DF 20x    -Mini Squats 15x    -Walking marches at rail x20    -LAQ x15 on R LE  *defer  -Heel raises x20   -Cable side step with 7.5 lbs resistance, x3 each direction, min A for safety.  -Sit to stand no hands off elevated mat table x10       Assessment/Plan  Once again educated Jasson on importance of him consistently walking more regularly at home to see better results from therapy. Still significant fatigue noted while walking track with foot drag, forward flexed posture, and reliance on wall for additional support when available. Also one episode of LOB noted while trying to get on leg press.          Timed:    Manual Therapy:         mins  36529;  Therapeutic Exercise:    22     mins  94067;     Neuromuscular Salvador:        mins  71834;    Therapeutic Activity:     8     mins  93954;     Gait Training:      10     mins  04658;     Ultrasound:          mins  68766;    Electrical Stimulation:         mins  73132 ( );  Iontophoresis         mins 86236;  Aquatic Therapy         mins 93120;  Dry Needling                   mins /  (Self-pay)    Untimed:  Electrical Stimulation:         mins  " 69348 ( );  Traction:         mins  13358;     Timed Treatment:   40   mins   Total Treatment:     40   mins    Ani Mehta PT  Physical Therapist    [place] License:[id number]

## 2022-04-04 ENCOUNTER — TREATMENT (OUTPATIENT)
Dept: PHYSICAL THERAPY | Facility: CLINIC | Age: 87
End: 2022-04-04

## 2022-04-04 DIAGNOSIS — R26.89 BALANCE PROBLEM: Primary | ICD-10-CM

## 2022-04-04 DIAGNOSIS — R29.6 MULTIPLE FALLS: ICD-10-CM

## 2022-04-04 PROCEDURE — 97530 THERAPEUTIC ACTIVITIES: CPT | Performed by: PHYSICAL THERAPIST

## 2022-04-04 PROCEDURE — 97110 THERAPEUTIC EXERCISES: CPT | Performed by: PHYSICAL THERAPIST

## 2022-04-04 PROCEDURE — 97116 GAIT TRAINING THERAPY: CPT | Performed by: PHYSICAL THERAPIST

## 2022-04-04 NOTE — PROGRESS NOTES
"Physical Therapy Daily Progress Note    Patient: Jasson Gonzalez   : 1935  Diagnosis/ICD-10 Code:  Balance problem [R26.89]  Referring practitioner: Holly Kincaid MD  Date of Initial Visit: Type: THERAPY  Noted: 10/20/2021  Today's Date: 2022  Patient seen for 24 sessions           Subjective   Jasson reports he still didn't walk this weekend as it was too cold but he rode his bike a bit.    Objective     See Exercise, Manual, and Modality Logs for complete treatment.       EXERCISES:  -Walk track 2 laps: emphasis on arm swing, heel toe gait, and upright posture. Use of cane in L UE with education on proper gait sequencing  -1K Leg Press, 80 lbs B LE x20, S/L x20   -6K hip abduction machine, 50 lbs, 3x15  - 2\" Step ups x20 each  -Alt toe taps x20 (4\" height) *defer  -Ankle AROM DF 20x  *defer  -Mini Squats 15x    -Walking marches at rail x20    -LAQ x15 on R LE  *defer  -Heel raises x20   -Cable side step with 7.5 lbs resistance, x4 each direction, min A for safety.  -Sit to stand no hands off elevated mat table x10 *defer    Assessment/Plan  Jasson is still needing seated rest breaks throughout the session due to fatigue. There is also still a lot of R quad weakness and S/L R leg press was broken up into 4 sets of 5 reps (versus 20 consecutive). Better postural awareness with walking marches but he is still getting forward flexed at his trunk when side stepping with the cable resistance.         Timed:    Manual Therapy:         mins  55226;  Therapeutic Exercise:    20     mins  46608;     Neuromuscular Salvador:        mins  24000;    Therapeutic Activity:     10     mins  08731;     Gait Training:      10     mins  36563;     Ultrasound:          mins  99355;    Electrical Stimulation:         mins  85151 ( );  Iontophoresis         mins 20382;  Aquatic Therapy         mins 02808;  Dry Needling                   mins /  (Self-pay)    Untimed:  Electrical Stimulation:         mins  " 37461 ( );  Traction:         mins  49469;     Timed Treatment:   40   mins   Total Treatment:     40   mins    Ani Mehta PT  Physical Therapist    [place] License:[id number]

## 2022-04-08 ENCOUNTER — TREATMENT (OUTPATIENT)
Dept: PHYSICAL THERAPY | Facility: CLINIC | Age: 87
End: 2022-04-08

## 2022-04-08 DIAGNOSIS — R26.89 BALANCE PROBLEM: ICD-10-CM

## 2022-04-08 DIAGNOSIS — R29.6 MULTIPLE FALLS: Primary | ICD-10-CM

## 2022-04-08 PROCEDURE — 97112 NEUROMUSCULAR REEDUCATION: CPT | Performed by: PHYSICAL THERAPIST

## 2022-04-08 PROCEDURE — 97110 THERAPEUTIC EXERCISES: CPT | Performed by: PHYSICAL THERAPIST

## 2022-04-08 PROCEDURE — 97530 THERAPEUTIC ACTIVITIES: CPT | Performed by: PHYSICAL THERAPIST

## 2022-04-08 NOTE — PROGRESS NOTES
"Physical Therapy Daily Progress Note    Patient: Jasson Gonzalez   : 1935  Diagnosis/ICD-10 Code:  Multiple falls [R29.6]  Referring practitioner: Holly Kincaid MD  Date of Initial Visit: Type: THERAPY  Noted: 10/20/2021  Today's Date: 2022  Patient seen for 25 sessions           Subjective   Jasson reports he has been walking around the house since the weather has been bad.    Objective   See Exercise, Manual, and Modality Logs for complete treatment.       EXERCISES:  -Walk track 2 laps: emphasis on arm swing, heel toe gait, and upright posture. Use of cane in L UE with education on proper gait sequencing  -1K Leg Press, 80 lbs B LE x20, S/L x20   -6K hip abduction machine, 50 lbs, 3x15  - 2\" Step ups x20 each  -Alt toe taps x20 (4\" height) *defer  -Ankle AROM DF 20x  *defer  -Mini Squats 15x    -Walking marches at rail x20    -LAQ x15 on R LE  *defer  -Heel raises x20   -Cable side step with 7.5 lbs resistance, x4 each direction, min A for safety.  -Side step along rail (limiting UE support) 20 ft x 2  -Sit to stand no hands off elevated mat table x10 *defer    Assessment/Plan  Jasson was able to completed 2x10 reps of single leg press using R LE today indicating some improvement in R quad strength. Still working on progressing stamina with standing/walking. Added side stepping along rail to focus more on frontal plane stability today.          Timed:    Manual Therapy:         mins  47442;  Therapeutic Exercise:    22     mins  70323;     Neuromuscular Salvador:    10    mins  50909;    Therapeutic Activity:     8     mins  39311;     Gait Training:           mins  26462;     Ultrasound:          mins  63561;    Electrical Stimulation:         mins  63016 ( );  Iontophoresis         mins 69705;  Aquatic Therapy         mins 02410;  Dry Needling                   mins /  (Self-pay)    Untimed:  Electrical Stimulation:         mins  61526 ( );  Traction:         mins  73296; "     Timed Treatment:   40   mins   Total Treatment:     40   mins    Ani Mehta PT  Physical Therapist    [place] License:[id number]

## 2022-04-11 ENCOUNTER — TREATMENT (OUTPATIENT)
Dept: PHYSICAL THERAPY | Facility: CLINIC | Age: 87
End: 2022-04-11

## 2022-04-11 DIAGNOSIS — R29.6 MULTIPLE FALLS: Primary | ICD-10-CM

## 2022-04-11 DIAGNOSIS — R26.89 BALANCE PROBLEM: ICD-10-CM

## 2022-04-11 PROCEDURE — 97110 THERAPEUTIC EXERCISES: CPT | Performed by: PHYSICAL THERAPIST

## 2022-04-11 PROCEDURE — 97116 GAIT TRAINING THERAPY: CPT | Performed by: PHYSICAL THERAPIST

## 2022-04-11 PROCEDURE — 97530 THERAPEUTIC ACTIVITIES: CPT | Performed by: PHYSICAL THERAPIST

## 2022-04-11 NOTE — PROGRESS NOTES
"Physical Therapy Daily Progress Note    Patient: Jasson Gonzalez   : 1935  Diagnosis/ICD-10 Code:  Multiple falls [R29.6]  Referring practitioner: Holly Kincaid MD  Date of Initial Visit: Type: THERAPY  Noted: 10/20/2021  Today's Date: 2022  Patient seen for 26 sessions           Subjective   Jasson reports he walked a little in the house this weekend but still hasn't walked outside. He tends to ride his elayne deer to take out trash or get the mail    Objective     See Exercise, Manual, and Modality Logs for complete treatment.       EXERCISES:  -Walk track 2 laps: emphasis on arm swing, heel toe gait, and upright posture. Use of cane in L UE with education on proper gait sequencing  -1K Leg Press, 80 lbs B LE x20, S/L x20   -6K hip abduction machine, 50 lbs, 3x15  - 2\" Step ups x20 each  -Alt toe taps x20 (4\" height) *defer  -Ankle AROM DF 20x  *defer  -Mini Squats 15x    -Walking marches at rail x20    -LAQ x15 on R LE  *defer  -Heel raises x20   -Cable side step with 7.5 lbs resistance, x4 each direction, min A for safety.  -Side step along rail (limiting UE support) 20 ft x 2  -Sit to stand no hands off elevated mat table x10 *defer    Assessment/Plan  Jasson continues to require cueing for upright posture with side stepping. He also is still needing intermittent standing and seated rest breaks throughout the session due to fatigue and poor stamina. Encouraged more equal WB with squats and focused on even step length with gait training.          Timed:    Manual Therapy:         mins  54367;  Therapeutic Exercise:    22     mins  79977;     Neuromuscular Salvador:        mins  98209;    Therapeutic Activity:     8     mins  46569;     Gait Training:      10     mins  25579;     Ultrasound:          mins  61817;    Electrical Stimulation:         mins  73306 ( );  Iontophoresis         mins 65903;  Aquatic Therapy         mins 25570;  Dry Needling                   mins /  " (Self-pay)    Untimed:  Electrical Stimulation:         mins  55117 ( );  Traction:         mins  37069;     Timed Treatment:   40   mins   Total Treatment:     40   mins    Ani Mehta PT  Physical Therapist    [place] License:[id number]

## 2022-04-15 ENCOUNTER — TREATMENT (OUTPATIENT)
Dept: PHYSICAL THERAPY | Facility: CLINIC | Age: 87
End: 2022-04-15

## 2022-04-15 DIAGNOSIS — R29.6 MULTIPLE FALLS: Primary | ICD-10-CM

## 2022-04-15 DIAGNOSIS — R26.89 BALANCE PROBLEM: ICD-10-CM

## 2022-04-15 PROCEDURE — 97116 GAIT TRAINING THERAPY: CPT | Performed by: PHYSICAL THERAPIST

## 2022-04-15 PROCEDURE — 97110 THERAPEUTIC EXERCISES: CPT | Performed by: PHYSICAL THERAPIST

## 2022-04-15 PROCEDURE — 97530 THERAPEUTIC ACTIVITIES: CPT | Performed by: PHYSICAL THERAPIST

## 2022-04-15 NOTE — PROGRESS NOTES
"Physical Therapy Daily Progress Note    Patient: Jasson Gonzalez   : 1935  Diagnosis/ICD-10 Code:  Multiple falls [R29.6]  Referring practitioner: Holly Kincaid MD  Date of Initial Visit: Type: THERAPY  Noted: 10/20/2021  Today's Date: 4/15/2022  Patient seen for 27 sessions           Subjective   Jasson reports he walked to and from the mailbox yesterday.      Objective   See Exercise, Manual, and Modality Logs for complete treatment.       EXERCISES:  -Walk track 2 laps: emphasis on arm swing, heel toe gait, and upright posture. Use of cane in L UE with education on proper gait sequencing  -1K Leg Press, 80 lbs B LE x20, S/L x20   -6K hip abduction machine, 50 lbs, 3x15  - 2\" Step ups x20 each  -Alt toe taps x20 (4\" height) *defer  -Ankle AROM DF 20x  *defer  -Mini Squats 15x    -Walking marches at rail x20    -LAQ x15 on R LE  *defer  -Heel raises x20   -Cable side step with 7.5 lbs resistance, x4 each direction, min A for safety.  -Side step along rail (limiting UE support) 20 ft x 2  -Sit to stand no hands off elevated mat table x10 *defer    Assessment/Plan  Jasson is still struggling with stamina throughout the therapy session. He tends to go into more exaggerated forward trunk flexion the more fatigued he gets. Also still lacking quad control in R LE which is more notable with step ups.          Timed:    Manual Therapy:         mins  31531;  Therapeutic Exercise:    22     mins  57543;     Neuromuscular Salvador:        mins  01782;    Therapeutic Activity:     8     mins  07049;     Gait Training:      10     mins  25043;     Ultrasound:          mins  51299;    Electrical Stimulation:         mins  62375 ( );  Iontophoresis         mins 14121;  Aquatic Therapy         mins 38963;  Dry Needling                   mins /  (Self-pay)    Untimed:  Electrical Stimulation:         mins  48422 ( );  Traction:         mins  19149;     Timed Treatment:   40   mins   Total Treatment:   "   40   mins    Ani Mehta, PT  Physical Therapist    [place] License:[id number]

## 2022-04-18 ENCOUNTER — TREATMENT (OUTPATIENT)
Dept: PHYSICAL THERAPY | Facility: CLINIC | Age: 87
End: 2022-04-18

## 2022-04-18 DIAGNOSIS — R29.6 MULTIPLE FALLS: Primary | ICD-10-CM

## 2022-04-18 DIAGNOSIS — R26.89 BALANCE PROBLEM: ICD-10-CM

## 2022-04-18 PROCEDURE — 97116 GAIT TRAINING THERAPY: CPT | Performed by: PHYSICAL THERAPIST

## 2022-04-18 PROCEDURE — 97530 THERAPEUTIC ACTIVITIES: CPT | Performed by: PHYSICAL THERAPIST

## 2022-04-18 PROCEDURE — 97110 THERAPEUTIC EXERCISES: CPT | Performed by: PHYSICAL THERAPIST

## 2022-04-18 NOTE — PROGRESS NOTES
"Physical Therapy Daily Progress Note    Patient: Jasson Gonzalez   : 1935  Diagnosis/ICD-10 Code:  Multiple falls [R29.6]  Referring practitioner: Holly Kincaid MD  Date of Initial Visit: Type: THERAPY  Noted: 10/20/2021  Today's Date: 2022  Patient seen for 28 sessions           Subjective   Jasson reports he tried to get out and walk a little this past weekend.    Objective     See Exercise, Manual, and Modality Logs for complete treatment.       EXERCISES:  -Walk track 2 laps: emphasis on arm swing, heel toe gait, and upright posture. Use of cane in L UE with education on proper gait sequencing  -1K Leg Press, 80 lbs B LE x20, S/L x20   -6K hip abduction machine, 50 lbs, 3x15  - 2\" Step ups x20 each  -Alt toe taps x20 (4\" height) *defer  -Mini Squats 15x    -Walking marches at rail x20    -LAQ x15 on R LE    -Seated heel-toe raises x20  -Heel raises x20   -Cable side step with 7.5 lbs resistance, x4 each direction, min A for safety.  -Side step along rail (limiting UE support) 20 ft x 2  -Sit to stand no hands off elevated mat table x10 *defer    Assessment/Plan   Attempted heel/toe raises in sitting but patient had trouble with LE coordination. Still focusing on LE stamina, posture, and quality of gait/transfers. Trying to encourage more standing rest breaks instead of recovering in sitting.           Timed:    Manual Therapy:         mins  82811;  Therapeutic Exercise:    22     mins  27747;     Neuromuscular Salvador:        mins  41420;    Therapeutic Activity:     10     mins  95444;     Gait Trainin     mins  82563;     Ultrasound:          mins  88853;    Electrical Stimulation:         mins  63804 ( );  Iontophoresis         mins 12994;  Aquatic Therapy         mins 44311;  Dry Needling                   mins /  (Self-pay)    Untimed:  Electrical Stimulation:         mins  76666 ( );  Traction:         mins  54300;     Timed Treatment:   40   mins   Total " Treatment:     40   mins    Ani Mehta PT  Physical Therapist    [place] License:[id number]

## 2022-04-22 ENCOUNTER — TREATMENT (OUTPATIENT)
Dept: PHYSICAL THERAPY | Facility: CLINIC | Age: 87
End: 2022-04-22

## 2022-04-22 DIAGNOSIS — R29.6 MULTIPLE FALLS: Primary | ICD-10-CM

## 2022-04-22 DIAGNOSIS — R26.89 BALANCE PROBLEM: ICD-10-CM

## 2022-04-22 PROCEDURE — 97116 GAIT TRAINING THERAPY: CPT | Performed by: PHYSICAL THERAPIST

## 2022-04-22 PROCEDURE — 97530 THERAPEUTIC ACTIVITIES: CPT | Performed by: PHYSICAL THERAPIST

## 2022-04-22 PROCEDURE — 97110 THERAPEUTIC EXERCISES: CPT | Performed by: PHYSICAL THERAPIST

## 2022-04-22 NOTE — PROGRESS NOTES
"Physical Therapy Recertification       Patient: Jasson Gonzalez   : 1935  Diagnosis/ICD-10 Code:  Multiple falls [R29.6]  Referring practitioner: Holly Kincaid MD  Date of Initial Visit: 2022  Today's Date: 2022  Patient seen for 29 sessions         Clinical Progress: improved  Home Program Compliance: Yes  Treatment has included:  therapeutic exercise, therapeutic activity, neuro-muscular retraining  and gait training    Subjective   Jasson reports his legs feel tired today. He has been trying to walk more at home but mostly inside the house (2-3x/day).    Objective     Strength/Myotome Testing      Left Hip   Planes of Motion   Flexion: 4+     Right Hip   Planes of Motion   Flexion: 4-     Left Knee   Flexion: 4+  Extension: 4+     Right Knee   Flexion: 4-  Extension: 3-     Left Ankle/Foot   Dorsiflexion: 3     Right Ankle/Foot   Dorsiflexion: 3-     Ambulation      Comments   Wide MARGO, steppage gait due to lack of DF AROM for foot clearance, forward trunk lean, B foot eversion     Functional Assessment   -SEE ROSE  -Relies on hands off knees or chair armrests for sit to stand transfer      See Exercise, Manual, and Modality Logs for complete treatment.      Functional Outcome Score: ROSE = 29; 5xSTS 13.4 sec (hands pushing off chair)         EXERCISES:  -Walk track 2 laps: emphasis on arm swing, heel toe gait, and upright posture. Use of cane in L UE with education on proper gait sequencing  -1K Leg Press, 80 lbs B LE x20, S/L x20   -6K hip abduction machine, 50 lbs, 3x15  - 2\" Step ups x20 each *defer  -NuStep L5, 5 min  -Alt toe taps x20 (4\" height) *defer  -Mini Squats 15x    -Walking marches at rail x20    -LAQ x15 on R LE    -Seated heel-toe raises x20  -Heel raises x20   -Cable side step with 7.5 lbs resistance, x4 each direction, min A for safety.  -Side step along rail (limiting UE support) 20 ft x 2  -Sit to stand no hands off elevated mat table x10         Assessment/Plan  Jasson DEL VALLE" Lisa has been seen for 29 physical therapy sessions for balance training due to multiple falls.  Treatment has included therapeutic exercise, therapeutic activity, gait training and neuro-muscular retraining. Progress to physical therapy goals is good. Patient continues to struggle with stamina when walking. He has been better about walking at home on the days he doesn't have therapy but distance is still limited due to muscle fatigue and patient feeling SOB. R LE quad weakness is still severe and B ankle DF weakness makes him a big fall risk due to occasional foot drag. Majority of goals still ongoing and appropriate. He will benefit from continued skilled physical therapy to address remaining impairments and functional limitations.                 Short Term Goals for completion in 30 days:   -Patient will report compliance and independence with initial HEP (MET)  -Patient will improve ability to complete 6 alternating toe taps to increase LE coordination (ONGOING- needs UE support)    LTGs for completion within 90 days:  -Patient will demonstrate sit to stand without use of hands in order to increase functional strength (ONGOING)  -Patient will increase ROSE from 23 to 30 to reduce risk of falls (ONGOING)  -Patient will improve 5xSTS from 13.1 seconds to </=11 seconds in order to decrease risk of falls and increase functional strength (ONGOING)  -Patient will increase LE knee extension strength to 4-/5 or greater to increase LE stability during gait (ONGOING)        Recommendations: Continue as planned  Timeframe: 1 month; 2x/week  Prognosis to achieve goals: good    Timed:  Manual Therapy:         mins  80416;  Therapeutic Exercise:    22     mins  61556;     Neuromuscular Salvador:        mins  33110;    Therapeutic Activity:     10     mins  92981;     Gait Trainin     mins  97455;     Ultrasound:          mins  01784;    Electrical Stimulation:         mins  42113 ( );  Iontophoresis          mins 13690  Dry Needling        mins 20560/ 20561 (Self-pay)      Untimed:  Electrical Stimulation:         mins  18157 ( );  Mechanical Traction:         mins  50057;     Timed Treatment:   40   mins   Total Treatment:     40   mins    PT SIGNATURE: CHELLY Chow PT License Number: [id number]    Electronically signed by Ani Mehta PT, 04/22/22, 2:20 PM EDT]    DATE TREATMENT INITIATED: 4/22/2022    90 Day Recertification  Certification Period: 7/21/2022  I certify that the therapy services are furnished while this patient is under my care.  The services outlined above are required by this patient, and will be reviewed every 90 days.     PHYSICIAN: Holly Kincaid MD   NPI: [id number]                                         DATE:

## 2022-04-25 ENCOUNTER — TREATMENT (OUTPATIENT)
Dept: PHYSICAL THERAPY | Facility: CLINIC | Age: 87
End: 2022-04-25

## 2022-04-25 DIAGNOSIS — R29.6 MULTIPLE FALLS: Primary | ICD-10-CM

## 2022-04-25 DIAGNOSIS — R26.89 BALANCE PROBLEM: ICD-10-CM

## 2022-04-25 PROCEDURE — 97110 THERAPEUTIC EXERCISES: CPT | Performed by: PHYSICAL THERAPIST

## 2022-04-25 PROCEDURE — 97116 GAIT TRAINING THERAPY: CPT | Performed by: PHYSICAL THERAPIST

## 2022-04-25 PROCEDURE — 97530 THERAPEUTIC ACTIVITIES: CPT | Performed by: PHYSICAL THERAPIST

## 2022-04-25 NOTE — PROGRESS NOTES
"Physical Therapy Daily Progress Note    Patient: Jasson Gonzalez   : 1935  Diagnosis/ICD-10 Code:  Multiple falls [R29.6]  Referring practitioner: Holly Kincaid MD  Date of Initial Visit: Type: THERAPY  Noted: 10/20/2021  Today's Date: 2022  Patient seen for 30 sessions           Subjective   Jasson reports he walked outside a little this weekend but his driveway is still too long for him to walk.    Objective     See Exercise, Manual, and Modality Logs for complete treatment.        EXERCISES:  -Walk track 2 laps: emphasis on arm swing, heel toe gait, and upright posture. Use of cane in L UE with education on proper gait sequencing  -1K Leg Press, 80 lbs B LE x20, S/L x20   -6K hip abduction machine, 50 lbs, 3x15  - 2\" Step ups x20 each *defer  -NuStep L5, 5 min  -Alt toe taps x20 (4\" height) *defer  -Mini Squats 15x    -Walking marches at rail x20    -LAQ x15 on R LE    -Seated heel-toe raises x20  -Heel raises x20   -Cable side step with 7.5 lbs resistance, x4 each direction, min A for safety.  -Side step along rail (limiting UE support) 20 ft x 2  -Sit to stand no hands off elevated mat table x10     Assessment/Plan  Discussed having Jasson switch to rollator to allow for him to walk with better stability and for longer distances. He had 1-2 episodes of near LOB anteriorly today due to muscle fatigue. Still needing intermittent seated rest breaks throughout the session as well.         Timed:    Manual Therapy:         mins  49103;  Therapeutic Exercise:    22     mins  54685;     Neuromuscular Salvador:        mins  16908;    Therapeutic Activity:     8     mins  90991;     Gait Training:      10     mins  46695;     Ultrasound:          mins  33449;    Electrical Stimulation:         mins  88687 ( );  Iontophoresis         mins 78766;  Aquatic Therapy         mins 03402;  Dry Needling                   mins /  (Self-pay)    Untimed:  Electrical Stimulation:         mins  45948 ( " );  Traction:         mins  78653;     Timed Treatment:   40   mins   Total Treatment:     40   mins    Ani Mehta PT  Physical Therapist    [place] License:[id number]

## 2022-04-28 DIAGNOSIS — G30.1 LATE ONSET ALZHEIMER'S DEMENTIA WITHOUT BEHAVIORAL DISTURBANCE: ICD-10-CM

## 2022-04-28 DIAGNOSIS — F02.80 LATE ONSET ALZHEIMER'S DEMENTIA WITHOUT BEHAVIORAL DISTURBANCE: ICD-10-CM

## 2022-04-29 ENCOUNTER — TREATMENT (OUTPATIENT)
Dept: PHYSICAL THERAPY | Facility: CLINIC | Age: 87
End: 2022-04-29

## 2022-04-29 DIAGNOSIS — R29.6 MULTIPLE FALLS: Primary | ICD-10-CM

## 2022-04-29 DIAGNOSIS — R26.89 BALANCE PROBLEM: ICD-10-CM

## 2022-04-29 PROCEDURE — 97110 THERAPEUTIC EXERCISES: CPT | Performed by: PHYSICAL THERAPIST

## 2022-04-29 PROCEDURE — 97116 GAIT TRAINING THERAPY: CPT | Performed by: PHYSICAL THERAPIST

## 2022-04-29 PROCEDURE — 97530 THERAPEUTIC ACTIVITIES: CPT | Performed by: PHYSICAL THERAPIST

## 2022-04-29 NOTE — PROGRESS NOTES
"Physical Therapy Daily Progress Note    Patient: Jasson Gonzalez   : 1935  Diagnosis/ICD-10 Code:  Multiple falls [R29.6]  Referring practitioner: Holly Kincaid MD  Date of Initial Visit: Type: THERAPY  Noted: 10/20/2021  Today's Date: 2022  Patient seen for 31 sessions           Subjective   Jasson reports he has a regular RW at home but he doesn't use it    Objective     See Exercise, Manual, and Modality Logs for complete treatment.       EXERCISES:  -Walk track 2 laps: emphasis on arm swing, heel toe gait, and upright posture. Use of cane in L UE with education on proper gait sequencing  -1K Leg Press, 80 lbs B LE x20, S/L x20   -6K hip abduction machine, 50 lbs, 3x15  - 2\" Step ups x20 each *defer  -NuStep L5, 5 min  -Alt toe taps x20 (4\" height) *defer  -Mini Squats 15x    -Walking marches at rail x20    -LAQ x15 on R LE    -Seated heel-toe raises x20  -Heel raises x20   -Cable side step with 7.5 lbs resistance, x4 each direction, min A for safety.  -Side step along rail (limiting UE support) 20 ft x 2  -Sit to stand no hands off elevated mat table x10        Assessment/Plan  Jasson had a few episodes of instability walking track and appears to be relying on wall and railing in addition to his cane more frequently. Once again encouraged him to consider rollator for faster mobility with better balance/safety. Lots of compensation from body with rocking noted with heel raises today.         Timed:    Manual Therapy:         mins  45345;  Therapeutic Exercise:    20     mins  62355;     Neuromuscular Sal[place]:        mins  61509;    Therapeutic Activity:     10     mins  42883;     Gait Training:     10      mins  76511;     Ultrasound:          mins  50043;    Electrical Stimulation:         mins  50156 ( );  Iontophoresis         mins 73002;  Aquatic Therapy         mins 56910;  Dry Needling                   mins /  (Self-pay)    Untimed:  Electrical Stimulation:         mins  " 20134 ( );  Traction:         mins  16378;     Timed Treatment:   40   mins   Total Treatment:     40   mins    Ani Mehta PT  Physical Therapist    [place] License:[id number]

## 2022-04-30 DIAGNOSIS — I48.21 PERMANENT ATRIAL FIBRILLATION: ICD-10-CM

## 2022-05-02 ENCOUNTER — OFFICE VISIT (OUTPATIENT)
Dept: NEUROLOGY | Facility: CLINIC | Age: 87
End: 2022-05-02

## 2022-05-02 VITALS
SYSTOLIC BLOOD PRESSURE: 118 MMHG | DIASTOLIC BLOOD PRESSURE: 62 MMHG | OXYGEN SATURATION: 98 % | HEART RATE: 77 BPM | WEIGHT: 143 LBS | HEIGHT: 70 IN | BODY MASS INDEX: 20.47 KG/M2

## 2022-05-02 DIAGNOSIS — G30.1 LATE ONSET ALZHEIMER'S DEMENTIA WITHOUT BEHAVIORAL DISTURBANCE: ICD-10-CM

## 2022-05-02 DIAGNOSIS — F02.80 LATE ONSET ALZHEIMER'S DEMENTIA WITHOUT BEHAVIORAL DISTURBANCE: ICD-10-CM

## 2022-05-02 DIAGNOSIS — R73.9 HYPERGLYCEMIA, UNSPECIFIED: ICD-10-CM

## 2022-05-02 DIAGNOSIS — G60.9 IDIOPATHIC PERIPHERAL NEUROPATHY: Primary | ICD-10-CM

## 2022-05-02 PROCEDURE — G2212 PROLONG OUTPT/OFFICE VIS: HCPCS | Performed by: PSYCHIATRY & NEUROLOGY

## 2022-05-02 PROCEDURE — 99205 OFFICE O/P NEW HI 60 MIN: CPT | Performed by: PSYCHIATRY & NEUROLOGY

## 2022-05-02 RX ORDER — DONEPEZIL HYDROCHLORIDE 10 MG/1
10 TABLET, FILM COATED ORAL NIGHTLY
Qty: 90 TABLET | Refills: 1 | Status: SHIPPED | OUTPATIENT
Start: 2022-05-02 | End: 2023-03-07

## 2022-05-02 RX ORDER — DONEPEZIL HYDROCHLORIDE 5 MG/1
TABLET, FILM COATED ORAL
Qty: 30 TABLET | Refills: 2 | OUTPATIENT
Start: 2022-05-02

## 2022-05-02 RX ORDER — APIXABAN 5 MG/1
TABLET, FILM COATED ORAL
Qty: 60 TABLET | Refills: 11 | Status: SHIPPED | OUTPATIENT
Start: 2022-05-02

## 2022-05-02 RX ORDER — MULTIPLE VITAMINS W/ MINERALS TAB 9MG-400MCG
1 TAB ORAL DAILY
COMMUNITY

## 2022-05-02 RX ORDER — DONEPEZIL HYDROCHLORIDE 5 MG/1
TABLET, FILM COATED ORAL
COMMUNITY
Start: 2022-04-01 | End: 2022-05-02 | Stop reason: DRUGHIGH

## 2022-05-02 NOTE — PROGRESS NOTES
CC: Alzheimer's disease    HPI:  Jasson Gonzaelz is a  87 y.o.  right-handed white male who was sent for neurological consultation by Dr. Holly Kincaid for Alzheimer's disease.  The patient is accompanied by his wife and his daughter who at G history.  The patient relates knowledge of some degree of memory trouble for about a year.  The progress has been slow and progressive.  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  I reviewed the films and agree but the area is very small and the burden of disease is small.  Other labs included normal TSH of 2.45, B12 level of 429 and a folate of greater than 20.  The patient still drives some generally by himself because his wife says she does not trust him.  She uses some examples which are a bit vague (note that the patient's wife has had a stroke previously also); however the patient is on Eliquis for atrial fibrillation and has embolic stroke risk.    From an activities of daily living the patient gets up and cleans up himself chooses N dons his own clothing and does some chores at home such as taking out the garbage and riding his lawnmower to cut the grass over about an acre or more.  The patient's wife is anxious when he is on the tractor because 1 time he got off the tractor to  a stick and fell and the neighbors came over to help him get up.  Patient insists he has no problem cutting the grass.  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  It does not appear that he has gotten into any other problems on the mower and he denies any motor vehicle accidents for many years.  The patient's wife indicates that at times he gets very confused and anxious in the evening but not only in the evening sometimes its in the morning and it may last several hours.  He may insist that they have to leave but he does not know why they have to leave.    The patient also has  troubles with balance which is actually fairly significant.  He has been undergoing physical therapy for a couple of months which has helped a little apparently.  He uses a cane.  Physical therapy apparently has recommended a Rollator.  The patient denies any bowel or bladder dysfunction.  There is no family history of memory loss but his mother had either a stroke or an MI in her 80s.  There is mention that the patient's father had some type of anxiety problem.  The patient has had bilateral knee replacements.  No one with a brain tumor brain hemorrhage or aneurysm of the brain artery.  The patient denies history of any significant head trauma meningitis seizure or syncope.  There is no event based history for TIA or stroke.    He also admits to about a 30 pound weight loss he says over 3 years but family says over 1 year.  The patient's wife indicates that it is difficult to convince him he needs a bath.  Of interest noted by physical exam he had severely disfigured toenails and on the right the great toenail had rubbed a full-thickness skin ulcer on the top of digit 2.    The patient denies being a smoker or drinker      Past Medical History:   Diagnosis Date   • Arthritis    • Detached retina    • Hyperlipidemia    • Hypertension          Past Surgical History:   Procedure Laterality Date   • CATARACT EXTRACTION Bilateral    • EYE SURGERY     • REPLACEMENT TOTAL KNEE      both           Current Outpatient Medications:   •  atenolol (TENORMIN) 25 MG tablet, TAKE ONE TABLET BY MOUTH DAILY, Disp: 90 tablet, Rfl: 1  •  atorvastatin (LIPITOR) 20 MG tablet, TAKE ONE TABLET BY MOUTH DAILY, Disp: 90 tablet, Rfl: 1  •  donepezil (ARICEPT) 5 MG tablet, , Disp: , Rfl:   •  Eliquis 5 MG tablet tablet, TAKE ONE TABLET BY MOUTH EVERY 12 HOURS, Disp: 60 tablet, Rfl: 11  •  multivitamin with minerals (PRESERVISION AREDS PO), Take 1 tablet by mouth Daily., Disp: , Rfl:   •  ramipril (ALTACE) 10 MG capsule, TAKE ONE CAPSULE BY  MOUTH DAILY, Disp: 90 capsule, Rfl: 1  •  zolpidem (AMBIEN) 5 MG tablet, Take 1 tablet by mouth At Night As Needed for Sleep., Disp: 30 tablet, Rfl: 0  •  donepezil (ARICEPT) 10 MG tablet, Take 1 tablet by mouth Every Night., Disp: 90 tablet, Rfl: 1      Family History   Problem Relation Age of Onset   • Cancer Mother         ovaries         Social History     Socioeconomic History   • Marital status:    Tobacco Use   • Smoking status: Never Smoker   • Smokeless tobacco: Never Used   Substance and Sexual Activity   • Alcohol use: No         No Known Allergies      Pain Scale: 0/10        ROS:  Review of Systems   Constitutional: Positive for activity change and appetite change. Negative for fatigue.   HENT: Positive for postnasal drip and sneezing. Negative for ear discharge, ear pain and facial swelling.    Eyes: Negative for pain, redness and itching.   Respiratory: Negative for cough, choking and shortness of breath.    Cardiovascular: Negative for chest pain and leg swelling.   Gastrointestinal: Negative for abdominal pain, nausea and vomiting.   Endocrine: Negative for cold intolerance, heat intolerance and polydipsia.   Musculoskeletal: Positive for gait problem. Negative for arthralgias and back pain.   Skin: Negative for color change, pallor and rash.   Allergic/Immunologic: Positive for environmental allergies. Negative for food allergies.   Neurological: Positive for weakness. Negative for dizziness, tremors, seizures, syncope, facial asymmetry, speech difficulty, light-headedness, numbness and headaches.   Psychiatric/Behavioral: Positive for confusion. Negative for agitation, behavioral problems, decreased concentration, dysphoric mood, hallucinations, self-injury, sleep disturbance and suicidal ideas. The patient is not nervous/anxious and is not hyperactive.          I have reviewed and agree with the above ROS completed by the medical assistant.      Physical Exam:  Vitals:    05/02/22 1326  "  BP: 118/62   Pulse: 77   SpO2: 98%   Weight: 64.9 kg (143 lb)   Height: 177.8 cm (70\")     Orthostatic BP:    Body mass index is 20.52 kg/m².    Physical Exam  General: Thin white male no acute distress  HEENT: Normocephalic no evidence of trauma.  Discs poorly seen.  Throat negative  Neck: Supple.  No thyromegaly.  No cervical bruits.  Heart: Somewhat irregular rhythm.  No murmurs appreciated  Extremities: No pedal edema.  Notable quadriceps atrophy on the right.      Neurological Exam:   Mental Status: Awake, alert, oriented 8/10 on Mini-Mental state exam.  Conversant without evidence of an affective disorder, thought disorder, delusions or hallucinations.  Attention span and concentration are normal.  HCF: No aphasia or dysarthria.  Intersecting pentagons were acceptable but his clock draw only included a [place] with 1 hand for score of 1/4.  Immediate recall 3/3.  Delayed recall 1/3.  Clock draw as noted.  Knowledge  of recent events probably intact.  Animal naming 7 animals in 1 minute  CN: I:   II: Visual fields full without left inattention   III, IV, VI: Eye movements intact without nystagmus or ptosis.  Pupils equal  round and reactive to light.   V,VII: Light touch and pinprick intact all 3 divisions of V.  Facial muscles symmetrical.   VIII: Hearing intact to finger rub   IX,X: Soft palate elevates symmetrically   XI: Sternomastoid and trapezius are strong.   XII: Tongue midline without atrophy or fasciculations  Motor: Normal tone in the upper and lower extremities right quadriceps atrophy present   Power testing: Mild weakness of the interossei and abductor pollicis brevis muscles bilaterally and perhaps a little weakness in the extensors of the fingers and thumb of the right hand.  In the lower extremities there is minor weakness in the iliopsoas but severe weakness in the quadriceps.  The adductor compartment seems okay.  There is mild weakness of tibialis anterior bilaterally as well as toe " extension.  Reflexes: Upper extremities: Hypoactive        Lower extremities: Hypoactive        Toe signs: Downgoing  Sensory: Light touch: Some reduction in the right leg more notable than left        Pinprick: Similar to light touch        Vibration: Absent at the ankles        Position: Difficult to test due to his severe overgrowth of toenails    Cerebellar: Finger-to-nose: Intact           Rapid movement: Intact           Heel-to-shin: Slow  Gait and Station: Steppage gait.  Clearly has significant weakness of dorsiflexion.  No drift.  Romberg not tested    Results:      Lab Results   Component Value Date    GLUCOSE 103 (H) 09/21/2021    BUN 19 09/21/2021    CREATININE 1.08 09/21/2021    EGFRIFNONA 65 09/21/2021    EGFRIFAFRI 79 09/21/2021    BCR 17.6 09/21/2021    CO2 25.5 09/21/2021    CALCIUM 9.1 09/21/2021    PROTENTOTREF 6.1 09/21/2021    ALBUMIN 4.30 09/21/2021    LABIL2 2.4 09/21/2021    AST 25 09/21/2021    ALT 18 09/21/2021       Lab Results   Component Value Date    WBC 10.23 09/21/2021    HGB 12.7 (L) 09/21/2021    HCT 37.4 (L) 09/21/2021    MCV 93.0 09/21/2021     09/21/2021         .No results found for: RPR      Lab Results   Component Value Date    TSH 2.450 09/21/2021    K7LGOOY 6.8 07/10/2018         Lab Results   Component Value Date    UBMZHOMK39 429 09/21/2021         Lab Results   Component Value Date    FOLATE >20.00 09/21/2021         No results found for: HGBA1C      Lab Results   Component Value Date    GLUCOSE 103 (H) 09/21/2021    BUN 19 09/21/2021    CREATININE 1.08 09/21/2021    EGFRIFNONA 65 09/21/2021    EGFRIFAFRI 79 09/21/2021    BCR 17.6 09/21/2021    K 4.4 09/21/2021    CO2 25.5 09/21/2021    CALCIUM 9.1 09/21/2021    PROTENTOTREF 6.1 09/21/2021    ALBUMIN 4.30 09/21/2021    LABIL2 2.4 09/21/2021    AST 25 09/21/2021    ALT 18 09/21/2021         Lab Results   Component Value Date    WBC 10.23 09/21/2021    HGB 12.7 (L) 09/21/2021    HCT 37.4 (L) 09/21/2021    MCV 93.0  09/21/2021     09/21/2021             Assessment:   1.  Memory loss-this seems to be a combination of memory loss with difficulty drawing the clock face but also animal naming only 7 animals in 1 minute.  I believe there are frontal lobe features to his presentation and although early Alzheimer's disease is one potential diagnosis frontotemporal degeneration is not excluded.  The patient has been on Aricept but when titrated to 10 mg daily he seemed to have an episode of confusion and the dosage was dropped to 5 mg again.  2.  Peripheral neuropathy-I suspect the patient's loss of sensation in the toes may have led to ulceration in the second toe from the very abnormal great toe nail.  He has evidence in both legs and arms of distal weakness but in addition there is right quadriceps weakness that is quite significant.  This could be due to a superimposed femoral neuropathy or L4 radiculopathy.          Plan:  1.  EMG right arm and leg  2.  MRI lumbar spine  3.  Labs including sed rate, RPR, SPE/IEP, cryoglobulins, heavy metal screen, copper level, hemoglobin A1c  4.  I have asked them to try escalating the Aricept to 7.5 mg daily on her way to 10 mg daily again.  5.  We discussed neuropsych testing.  I do not think a decision point will be made based on neuropsych testing right now but it may be helpful to determine whether or not this is Alzheimer's disease or frontotemporal degeneration.  6.  Recommend he see a podiatrist.  7.  Follow-up in about a month with LICHA Leon        Time: 90 minutes                Dictated utilizing Dragon dictation.

## 2022-05-05 ENCOUNTER — TELEPHONE (OUTPATIENT)
Dept: NEUROLOGY | Facility: CLINIC | Age: 87
End: 2022-05-05

## 2022-05-05 NOTE — TELEPHONE ENCOUNTER
Provider:   Caller: NITA  Relationship to Patient: DAUGHTER  Pharmacy: NA  Phone Number: [phone]  Reason for Call: NITA STATES  WAS SUPPOSED TO BE SENDING AN ORDER TO [facility] FOR A ROLLATOR WITH SEAT. THEY HAVE NOT RECEIVED THE ORDER YET. NITA WOULD LIKE FOR US TO SEND THE ORDER FOR [facility]. PLEASE ADVISE.     [facility] FAX: [phone]    When was the patient last seen: 5-2-22

## 2022-05-27 ENCOUNTER — TREATMENT (OUTPATIENT)
Dept: PHYSICAL THERAPY | Facility: CLINIC | Age: 87
End: 2022-05-27

## 2022-05-27 DIAGNOSIS — R29.6 MULTIPLE FALLS: Primary | ICD-10-CM

## 2022-05-27 DIAGNOSIS — R26.89 BALANCE PROBLEM: ICD-10-CM

## 2022-05-27 PROCEDURE — 97110 THERAPEUTIC EXERCISES: CPT | Performed by: PHYSICAL THERAPIST

## 2022-05-27 PROCEDURE — 97116 GAIT TRAINING THERAPY: CPT | Performed by: PHYSICAL THERAPIST

## 2022-05-27 PROCEDURE — 97112 NEUROMUSCULAR REEDUCATION: CPT | Performed by: PHYSICAL THERAPIST

## 2022-05-27 NOTE — PROGRESS NOTES
Physical Therapy Daily Progress Note    Patient: Jasson Gonzalez   : 1935  Diagnosis/ICD-10 Code:  Multiple falls [R29.6]  Referring practitioner: Holly Kincaid MD  Date of Initial Visit: Type: THERAPY  Noted: 10/20/2021  Today's Date: 2022  Patient seen for 32 sessions           Subjective trying to get my (R) leg stronger    Objective   See Exercise, Manual, and Modality Logs for complete treatment.       Assessment/Plan    Progressed with gait/ balance training/ LE strengthening per pt tolerance.  sba x 1 with gait.  LOB in frontal plane.  Progressed with quad/ glute medius strengthening per pt tolerance.  sba x 1 with sit-stand transfer. Timed sit-stand x 5 23s. Fatigued at conclusion of care       Timed:    Manual Therapy:         mins  68770;  Therapeutic Exercise:   24      mins  37912;     Neuromuscular Salvador:    8    mins  58784;    Therapeutic Activity:          mins  30218;     Gait Trainin      mins  62203;     Ultrasound:          mins  62822;    Electrical Stimulation:         mins  23863 ( );  Iontophoresis         mins 98573;  Aquatic Therapy         mins 95889;  Dry Needling                   mins /  (Self-pay)    Untimed:  Electrical Stimulation:         mins  34052 ( );  Traction:         mins  19206;     Timed Treatment:  40    mins   Total Treatment:     40   mins    Horace Jensen, PT  Physical Therapist    [place] License: [id number]

## 2022-06-01 ENCOUNTER — TREATMENT (OUTPATIENT)
Dept: PHYSICAL THERAPY | Facility: CLINIC | Age: 87
End: 2022-06-01

## 2022-06-01 DIAGNOSIS — R26.89 BALANCE PROBLEM: Primary | ICD-10-CM

## 2022-06-01 DIAGNOSIS — R29.6 MULTIPLE FALLS: ICD-10-CM

## 2022-06-01 PROCEDURE — 97110 THERAPEUTIC EXERCISES: CPT | Performed by: PHYSICAL THERAPIST

## 2022-06-01 PROCEDURE — 97530 THERAPEUTIC ACTIVITIES: CPT | Performed by: PHYSICAL THERAPIST

## 2022-06-01 PROCEDURE — 97116 GAIT TRAINING THERAPY: CPT | Performed by: PHYSICAL THERAPIST

## 2022-06-01 NOTE — PROGRESS NOTES
"30-Day / 10-Visit Progress Note         Patient: Jasson Gonzalez   : 1935  Diagnosis/ICD-10 Code:  Balance problem [R26.89]  Referring practitioner: Holly Kincaid MD  Date of Initial Visit: Type: THERAPY  Noted: 10/20/2021  Today's Date: 2022  Patient seen for 33 sessions      Subjective:     Clinical Progress: improved  Home Program Compliance: Yes  Treatment has included:  therapeutic exercise, therapeutic activity, neuro-muscular retraining  and gait training    Subjective   Patient recalls one fall in the past month. He has been trying to walk with his walker out to the mailbox     Objective     Myotome Testing      Left Hip   Planes of Motion   Flexion: 4+     Right Hip   Planes of Motion   Flexion: 4-     Left Knee   Flexion: 4+  Extension: 4+     Right Knee   Flexion: 4-  Extension: 3-     Left Ankle/Foot   Dorsiflexion: 3     Right Ankle/Foot   Dorsiflexion: 3-     Ambulation      Comments   Wide MARGO, steppage gait due to lack of DF AROM for foot clearance, forward trunk lean, B foot eversion     Functional Assessment   -SEE ROSE  -Relies on hands off knees or chair armrests for sit to stand transfer      See Exercise, Manual, and Modality Logs for complete treatment.      Functional Outcome Score: ROSE = 29; 5xSTS 12.5 (hands pushing off chair)      EXERCISES:  -Walk track 2 laps: emphasis on arm swing, heel toe gait, and upright posture. Use of cane in L UE with education on proper gait sequencing  -1K Leg Press, 80 lbs B LE x20, S/L x20   -6K hip abduction machine, 50 lbs, 3x15 *defer  - 2\" Step ups x20 each *defer  -NuStep L5, 5 min  -Alt toe taps x20 (4\" height) *defer  -Mini Squats 10x    -Walking marches at rail x20    -LAQ x15 on R LE  *defer  -Seated heel-toe raises x20 *defer  -Heel raises x20   -Cable side step with 7.5 lbs resistance, x4 each direction, min A for safety.  -Side step along rail (limiting UE support) 20 ft x 2  -Sit to stand no hands off elevated mat table " x10       Assessment/Plan  Jasson Gonzalez has been seen for 33 physical therapy sessions for balance training due to multiple falls.  Treatment has included therapeutic exercise, therapeutic activity, gait training and neuro-muscular retraining. Progress to physical therapy goals is good. Patient reports he is walking daily at home and uses his walker occasionally which allows him to tolerate slightly longer distances. Not many PT sessions completed in past month due to scheduling difficulties. R LE continues to present with significant weakness. Patient improved 5xSTS time but continues to rely on UE support on armrests and places most of his weight through his L LE. No changes in ROSE. Majority of goals still ongoing and appropriate. He will benefit from continued skilled physical therapy to address remaining impairments and functional limitations.                 Short Term Goals for completion in 30 days:   -Patient will report compliance and independence with initial HEP (MET)  -Patient will improve ability to complete 6 alternating toe taps to increase LE coordination (ONGOING- needs UE support)    LTGs for completion within 90 days:  -Patient will demonstrate sit to stand without use of hands in order to increase functional strength (ONGOING)  -Patient will increase ROSE from 23 to 30 to reduce risk of falls (ONGOING)  -Patient will improve 5xSTS from 13.1 seconds to </=11 seconds in order to decrease risk of falls and increase functional strength (ONGOING)  -Patient will increase LE knee extension strength to 4-/5 or greater to increase LE stability during gait (ONGOING)        Recommendations: Continue as planned  Timeframe: 1 month; 2x/week  Prognosis to achieve goals: good    PT Signature: CHELLY Chow License Number: [id number]    Electronically signed by Ani Mehta PT, 06/01/22, 12:17 PM EDT      Based upon review of the patient's progress and continued therapy plan, it is my medical opinion  that Jasson Gonzalez should continue physical therapy treatment at [facility]  [address]   [place] [address] [phone].    Signature: __________________________________  Holly Kincaid MD    Timed:  Manual Therapy:         mins  73162;  Therapeutic Exercise:    20     mins  53249;     Neuromuscular Salvador:        mins  39434;    Therapeutic Activity:     10     mins  98311;     Gait Training:      10     mins  07000;     Ultrasound:          mins  24856;    Iontophoresis         mins 02810;  Dry Needling                   mins [id number]/[phone] (Self-pay)    Untimed:  Electrical Stimulation:         mins  14627 ( );  Traction:         mins  88788;     Timed Treatment:   40   mins   Total Treatment:     40   mins

## 2022-06-06 ENCOUNTER — TREATMENT (OUTPATIENT)
Dept: PHYSICAL THERAPY | Facility: CLINIC | Age: 87
End: 2022-06-06

## 2022-06-06 DIAGNOSIS — R26.89 BALANCE PROBLEM: Primary | ICD-10-CM

## 2022-06-06 DIAGNOSIS — R29.6 MULTIPLE FALLS: ICD-10-CM

## 2022-06-06 PROCEDURE — 97530 THERAPEUTIC ACTIVITIES: CPT | Performed by: PHYSICAL THERAPIST

## 2022-06-06 PROCEDURE — 97116 GAIT TRAINING THERAPY: CPT | Performed by: PHYSICAL THERAPIST

## 2022-06-06 PROCEDURE — 97110 THERAPEUTIC EXERCISES: CPT | Performed by: PHYSICAL THERAPIST

## 2022-06-06 NOTE — PROGRESS NOTES
"Physical Therapy Daily Progress Note    Patient: Jasson Gonzalez   : 1935  Diagnosis/ICD-10 Code:  Balance problem [R26.89]  Referring practitioner: Holly Kincaid MD  Date of Initial Visit: Type: THERAPY  Noted: 10/20/2021  Today's Date: 2022  Patient seen for 34 sessions           Subjective   Patient reports he is feeling tired today.    Objective       EXERCISES:  -Walk track 1.5 laps: emphasis on arm swing, heel toe gait, and upright posture. Use of cane in L UE with education on proper gait sequencing  -1K Leg Press, 80 lbs B LE x20, S/L x20   -6K hip abduction machine, 50 lbs, 3x15   - 2\" Step ups x20 each  -NuStep L5, 5 min  -Mini Squats 10x    -Walking marches at rail x20 marches  -Side step along rail x 20 steps  -Heel raises x20   -Cable side step with 7.5 lbs resistance, x4 each direction, min A for safety.  -Side step along rail (limiting UE support) 20 ft x 2  -Sit to stand no hands off elevated mat table x10 *Defer    Assessment/Plan  Patient was notably more fatigued walking track today and required more frequent rest breaks. He also had trouble completing 20 reps on leg press with R LE and broke the set up into 4 smaller sets of ~5 reps. Still trying to prevent forward trunk flexion with gait.          Timed:    Manual Therapy:         mins  58627;  Therapeutic Exercise:    24     mins  26772;     Neuromuscular Salvador:        mins  30932;    Therapeutic Activity:     8     mins  68607;     Gait Trainin     mins  75586;     Ultrasound:          mins  40620;    Electrical Stimulation:         mins  14026 ( );  Iontophoresis         mins 56388;  Aquatic Therapy         mins 31617;  Dry Needling                   mins /  (Self-pay)    Untimed:  Electrical Stimulation:         mins  09416 ( );  Traction:         mins  52971;     Timed Treatment:   40   mins   Total Treatment:     40   mins    Ani Mehta PT  Physical Therapist    [place] License:[id number]  "

## 2022-06-08 ENCOUNTER — TREATMENT (OUTPATIENT)
Dept: PHYSICAL THERAPY | Facility: CLINIC | Age: 87
End: 2022-06-08

## 2022-06-08 DIAGNOSIS — R29.6 MULTIPLE FALLS: ICD-10-CM

## 2022-06-08 DIAGNOSIS — R26.89 BALANCE PROBLEM: Primary | ICD-10-CM

## 2022-06-08 PROCEDURE — 97530 THERAPEUTIC ACTIVITIES: CPT | Performed by: PHYSICAL THERAPIST

## 2022-06-08 PROCEDURE — 97116 GAIT TRAINING THERAPY: CPT | Performed by: PHYSICAL THERAPIST

## 2022-06-08 PROCEDURE — 97110 THERAPEUTIC EXERCISES: CPT | Performed by: PHYSICAL THERAPIST

## 2022-06-08 NOTE — PROGRESS NOTES
"Physical Therapy Daily Progress Note    Patient: Jasson Gonzalez   : 1935  Diagnosis/ICD-10 Code:  Balance problem [R26.89]  Referring practitioner: Holly Kincaid MD  Date of Initial Visit: Type: THERAPY  Noted: 10/20/2021  Today's Date: 2022  Patient seen for 35 sessions           Subjective   Patient reports he is feeling tired today.    Objective   See Exercise, Manual, and Modality Logs for complete treatment.     EXERCISES:  -Walk track 1.5 laps: emphasis on arm swing, heel toe gait, and upright posture. Use of cane in L UE with education on proper gait sequencing  -1K Leg Press, 80 lbs B LE x20, S/L x20   -6K hip abduction machine, 50 lbs, 3x15   - 2\" Step ups x20 each  -NuStep L5, 5 min  -Mini Squats 10x    -Walking marches at rail x20 marches  -Side step along rail x 20 steps  -Heel raises x20   -Cable side step with 7.5 lbs resistance, x4 each direction, min A for safety.  -Side step along rail (limiting UE support) 20 ft x 2  -Sit to stand no hands off elevated mat table x10 *Defer    Assessment/Plan  Patient continues to require frequent rest breaks (both standing and seated) due to fatigue. Able to perform single leg press in two sets of 10 on R LE. Still lots of compensation from UE support during step ups as patient tends to pull himself up instead of pushing through his legs.          Timed:    Manual Therapy:         mins  53098;  Therapeutic Exercise:    20     mins  12596;     Neuromuscular Salvador:        mins  19043;    Therapeutic Activity:     10     mins  91910;     Gait Training:      10     mins  96497;     Ultrasound:          mins  02138;    Electrical Stimulation:         mins  12790 ( );  Iontophoresis         mins 56370;  Aquatic Therapy         mins 61388;  Dry Needling                   mins /  (Self-pay)    Untimed:  Electrical Stimulation:         mins  31186 ( );  Traction:         mins  49688;     Timed Treatment:   40   mins   Total Treatment:  "    40   mins    Ani Mehta, PT  Physical Therapist    [place] License:[id number]

## 2022-06-15 ENCOUNTER — TREATMENT (OUTPATIENT)
Dept: PHYSICAL THERAPY | Facility: CLINIC | Age: 87
End: 2022-06-15

## 2022-06-15 DIAGNOSIS — R29.6 MULTIPLE FALLS: ICD-10-CM

## 2022-06-15 DIAGNOSIS — R26.89 BALANCE PROBLEM: Primary | ICD-10-CM

## 2022-06-15 PROCEDURE — 97530 THERAPEUTIC ACTIVITIES: CPT | Performed by: PHYSICAL THERAPIST

## 2022-06-15 PROCEDURE — 97110 THERAPEUTIC EXERCISES: CPT | Performed by: PHYSICAL THERAPIST

## 2022-06-15 PROCEDURE — 97116 GAIT TRAINING THERAPY: CPT | Performed by: PHYSICAL THERAPIST

## 2022-06-15 NOTE — PROGRESS NOTES
"Physical Therapy Daily Progress Note    Patient: Jasson Gonzalez   : 1935  Diagnosis/ICD-10 Code:  Balance problem [R26.89]  Referring practitioner: Holly Kincaid MD  Date of Initial Visit: Type: THERAPY  Noted: 10/20/2021  Today's Date: 6/15/2022  Patient seen for 36 sessions           Subjective   Patient reports he has been trying to walk inside the house because it's too hot outside.    Objective     EXERCISES:  -Walk track 1.5 laps: emphasis on arm swing, heel toe gait, and upright posture. Use of cane in L UE with education on proper gait sequencing  -1K Leg Press, 80 lbs B LE x20, S/L x20   -6K hip abduction machine, 50 lbs, 3x15   - 2\" Step ups x20 each  -NuStep L5, 5 min  -Mini Squats 10x    -Walking marches at rail x20 marches  -Side step along rail x 20 steps  -Heel raises x20   -Cable side step with 7.5 lbs resistance, x4 each direction, min A for safety.  -Side step along rail (limiting UE support) 20 ft x 2  -Sit to stand no hands off elevated mat table x10 *Defer    Assessment/Plan  Patient still heavily favoring L side with squats, tried to focus on more equal WB. Also still working on preventing L hip ER as compensation when side stepping along rail. Slowed down speed of walking marches to emphasize SLS balance.          Timed:     Manual Therapy:                 mins  20603;  Therapeutic Exercise:    20     mins  64583;     Neuromuscular Salvador:        mins  51380;    Therapeutic Activity:      10     mins  19222;     Gait Training:                 10     mins  94754;     Ultrasound:                          mins  09648;    Electrical Stimulation:         mins  44915 ( );  Iontophoresis                     mins 33169;  Aquatic Therapy                  mins 44351;  Dry Needling                    mins /  (Self-pay)     Untimed:  Electrical Stimulation:         mins  81059 ( );  Traction:                  mins  28420;      Timed Treatment:   40   mins   Total " Treatment:     40   mins     Ani Mehta PT  Physical Therapist    [place] License:[id number]

## 2022-06-17 ENCOUNTER — TREATMENT (OUTPATIENT)
Dept: PHYSICAL THERAPY | Facility: CLINIC | Age: 87
End: 2022-06-17

## 2022-06-17 DIAGNOSIS — R29.6 MULTIPLE FALLS: ICD-10-CM

## 2022-06-17 DIAGNOSIS — R26.89 BALANCE PROBLEM: Primary | ICD-10-CM

## 2022-06-17 PROCEDURE — 97110 THERAPEUTIC EXERCISES: CPT | Performed by: PHYSICAL THERAPIST

## 2022-06-17 PROCEDURE — 97530 THERAPEUTIC ACTIVITIES: CPT | Performed by: PHYSICAL THERAPIST

## 2022-06-17 PROCEDURE — 97116 GAIT TRAINING THERAPY: CPT | Performed by: PHYSICAL THERAPIST

## 2022-06-17 NOTE — PROGRESS NOTES
"Physical Therapy Daily Progress Note    Patient: Jasson Gonzalez   : 1935  Diagnosis/ICD-10 Code:  Balance problem [R26.89]  Referring practitioner: Holly Kincaid MD  Date of Initial Visit: Type: THERAPY  Noted: 10/20/2021  Today's Date: 2022  Patient seen for 37 sessions           Subjective   Patient reports he got his tractor back and has been using it a bit more to get the mail instead of walking    Objective       EXERCISES:  -Walk track 1.5 laps: emphasis on arm swing, heel toe gait, and upright posture. Use of cane in L UE with education on proper gait sequencing  -1K Leg Press, 80 lbs B LE x20, S/L x20   -6K hip abduction machine, 50 lbs, 3x15   - 2\" Step ups x20 each  -NuStep L5, 5 min  -Mini Squats 10x    -Walking marches at rail x20 marches  -Side step along rail x 20 steps  -Heel raises x20   -Cable side step with 7.5 lbs resistance, x4 each direction, min A for safety.  -Sit to stand no hands off elevated mat table x10 *Defer    Assessment/Plan  Encouraged use of rollator at home for balance and increased frequency of ambulation to improve stamina. Trying to focus more on forward weight shift to lessen UE reliance with step ups. Also cued for more upright posture with side steps. Jasson continues to require frequent standing rest breaks between his exercises.         Timed:    Manual Therapy:         mins  58832;  Therapeutic Exercise:    20     mins  14772;     Neuromuscular Salvador:        mins  43977;    Therapeutic Activity:     10     mins  30565;     Gait Training:      10     mins  56674;     Ultrasound:          mins  40139;    Electrical Stimulation:         mins  84666 ( );  Iontophoresis         mins 71480;  Aquatic Therapy         mins 92202;  Dry Needling                   mins /  (Self-pay)    Untimed:  Electrical Stimulation:         mins  95646 ( );  Traction:         mins  96206;     Timed Treatment:   40   mins   Total Treatment:     40   " shantanu Mehta, PT  Physical Therapist    [place] License:[id number]

## 2022-06-18 DIAGNOSIS — I48.21 PERMANENT ATRIAL FIBRILLATION: ICD-10-CM

## 2022-06-20 RX ORDER — ATENOLOL 25 MG/1
TABLET ORAL
Qty: 90 TABLET | Refills: 1 | Status: SHIPPED | OUTPATIENT
Start: 2022-06-20 | End: 2023-02-14

## 2022-06-22 ENCOUNTER — TREATMENT (OUTPATIENT)
Dept: PHYSICAL THERAPY | Facility: CLINIC | Age: 87
End: 2022-06-22

## 2022-06-22 DIAGNOSIS — R26.89 BALANCE PROBLEM: Primary | ICD-10-CM

## 2022-06-22 DIAGNOSIS — R29.6 MULTIPLE FALLS: ICD-10-CM

## 2022-06-22 PROCEDURE — 97530 THERAPEUTIC ACTIVITIES: CPT | Performed by: PHYSICAL THERAPIST

## 2022-06-22 PROCEDURE — 97116 GAIT TRAINING THERAPY: CPT | Performed by: PHYSICAL THERAPIST

## 2022-06-22 PROCEDURE — 97110 THERAPEUTIC EXERCISES: CPT | Performed by: PHYSICAL THERAPIST

## 2022-06-22 NOTE — PROGRESS NOTES
"Physical Therapy Daily Progress Note    Patient: Jasson Gonzalez   : 1935  Diagnosis/ICD-10 Code:  Balance problem [R26.89]  Referring practitioner: Holyl Kincaid MD  Date of Initial Visit: Type: THERAPY  Noted: 10/20/2021  Today's Date: 2022  Patient seen for 38 sessions           Subjective   Jasson reports his R thigh is feeling tight.    Objective     EXERCISES  -Walk track 1.5 laps: emphasis on arm swing, heel toe gait, and upright posture. Use of cane in L UE with education on proper gait sequencing  -1K Leg Press, 80 lbs B LE x20, S/L x20   -6K hip abduction machine, 50 lbs, 3x15   - 2\" Step ups x20 each  -NuStep L5, 5 min  -Mini Squats 10x    -Walking marches at rail x20 marches  -Side step along rail x 20 steps  -Heel raises x20   -Cable side step with 7.5 lbs resistance, x4 each direction, min A for safety.  -Sit to stand no hands off elevated mat table x10 *Defer         Assessment/Plan  Educated Jasson on using rolling pin at home to try and massage R thigh muscle. He continues to require more frequent rest breaks throughout the session and his gait speed slows down as he becomes more fatigued. Better upright posture noted when side stepping to L today compared to R. Encouraged him to consider using rollator for all community mobility instead of cane.         Timed:    Manual Therapy:         mins  75064;  Therapeutic Exercise:    24     mins  00298;     Neuromuscular Salvador:        mins  59452;    Therapeutic Activity:     8     mins  72282;     Gait Trainin     mins  15438;     Ultrasound:          mins  01468;    Electrical Stimulation:         mins  86718 ( );  Iontophoresis         mins 13711;  Aquatic Therapy         mins 61321;  Dry Needling                   mins 33772/  (Self-pay)    Untimed:  Electrical Stimulation:         mins  17329 ( );  Traction:         mins  94893;     Timed Treatment:   40   mins   Total Treatment:     40   mins    Ani Mehta" PT  Physical Therapist    [place] License:[id number]

## 2022-06-24 ENCOUNTER — TREATMENT (OUTPATIENT)
Dept: PHYSICAL THERAPY | Facility: CLINIC | Age: 87
End: 2022-06-24

## 2022-06-24 DIAGNOSIS — R29.6 MULTIPLE FALLS: ICD-10-CM

## 2022-06-24 DIAGNOSIS — R26.89 BALANCE PROBLEM: Primary | ICD-10-CM

## 2022-06-24 PROCEDURE — 97116 GAIT TRAINING THERAPY: CPT | Performed by: PHYSICAL THERAPIST

## 2022-06-24 PROCEDURE — 97110 THERAPEUTIC EXERCISES: CPT | Performed by: PHYSICAL THERAPIST

## 2022-06-24 PROCEDURE — 97530 THERAPEUTIC ACTIVITIES: CPT | Performed by: PHYSICAL THERAPIST

## 2022-06-24 NOTE — PROGRESS NOTES
"Physical Therapy Daily Treatment Note    Patient: Jasson Gonzalez   : 1935  Diagnosis/ICD-10 Code:  Balance problem [R26.89]  Referring practitioner: Holly Kincaid MD  Date of Initial Visit: Type: THERAPY  Noted: 10/20/2021  Today's Date: 2022  Patient seen for 39 sessions           Subjective   Jasson reports his legs feel tired today    Objective     EXERCISES  -Walk track 1 lap x 2: emphasis on arm swing, heel toe gait, and upright posture. Use of cane in L UE with education on proper gait sequencing  -1K Leg Press, 80 lbs B LE x20, S/L x20   -6K hip abduction machine, 50 lbs, 3x15   - 2\" Step ups x20 each  -NuStep L5, 5 min  -Mini Squats 10x    -Walking marches at rail x20 marches  -Side step along rail x 20 steps  -Heel raises x20   -Cable side step with 7.5 lbs resistance, x4 each direction, min A for safety.  -Sit to stand no hands off elevated mat table x10 *Defer    Assessment/Plan  Jasson continues to require intermittent cueing throughout the session to maintain more upright posture. Discussed having him bring his rollator to PT next visit to see if it helps his endurance with ambulation. Patient had notable fatigue in R thigh after second lap of walking and was rubbing it due to discomfort.          Timed:    Manual Therapy:         mins  58449;  Therapeutic Exercise:    22     mins  75424;     Neuromuscular Salvador:        mins  25777;    Therapeutic Activity:     10     mins  62206;     Gait Trainin     mins  19397;     Ultrasound:          mins  33445;    Electrical Stimulation:         mins  22211 ( );  Iontophoresis         mins 08237;  Aquatic Therapy         mins 80343;  Dry Needling                   mins 40067/  (Self-pay)    Untimed:  Electrical Stimulation:         mins  39291 ( );  Traction:         mins  44455;     Timed Treatment:   40   mins   Total Treatment:     40   mins    Ani Mehta PT  Physical Therapist    [place] License:[id number]  "

## 2022-06-27 ENCOUNTER — TREATMENT (OUTPATIENT)
Dept: PHYSICAL THERAPY | Facility: CLINIC | Age: 87
End: 2022-06-27

## 2022-06-27 DIAGNOSIS — R29.6 MULTIPLE FALLS: ICD-10-CM

## 2022-06-27 DIAGNOSIS — R26.89 BALANCE PROBLEM: Primary | ICD-10-CM

## 2022-06-27 PROCEDURE — 97110 THERAPEUTIC EXERCISES: CPT | Performed by: PHYSICAL THERAPIST

## 2022-06-27 PROCEDURE — 97116 GAIT TRAINING THERAPY: CPT | Performed by: PHYSICAL THERAPIST

## 2022-06-27 PROCEDURE — 97530 THERAPEUTIC ACTIVITIES: CPT | Performed by: PHYSICAL THERAPIST

## 2022-06-27 NOTE — PROGRESS NOTES
"Physical Therapy Daily Treatment Note    Patient: Jasson Gonzalez   : 1935  Diagnosis/ICD-10 Code:  Balance problem [R26.89]  Referring practitioner: Holly Kincaid MD  Date of Initial Visit: Type: THERAPY  Noted: 10/20/2021  Today's Date: 2022  Patient seen for 40 sessions           Subjective   Jasson reports he did walk down and back to the mailbox this weekend.    Objective   See Exercise, Manual, and Modality Logs for complete treatment.       EXERCISES  -Walk track 1 lap x 3: emphasis on arm swing/heel toe gait. Cueing to avoid forward trunk lean and to straighten up spine  -1K Leg Press, 80 lbs B LE x20, S/L x20   -6K hip abduction machine, 50 lbs, 3x15   - 2\" Step ups x20 each  -NuStep L5, 5 min  -Mini Squats 10x    -Walking marches at rail x20 marches  -Side step along rail x 20 steps  -Heel raises x20   -Cable side step with 7.5 lbs resistance, x4 each direction, min A for safety.  -Sit to stand no hands off elevated mat table x10 *Defer    Assessment/Plan  Patient able to demonstrate better gait speed with less shortness of breath while using rollator today. He did seem to lean forward into the rollator quite a bit therefore focused more on postural awareness. Discussed transitioning to HEP after the next visit and the importance of regular walking and exercise to help avoid further deconditioning.          Timed:    Manual Therapy:         mins  08617;  Therapeutic Exercise:    20     mins  44545;     Neuromuscular Salvador:        mins  72794;    Therapeutic Activity:     10     mins  73169;     Gait Training:      10     mins  87848;     Ultrasound:          mins  67375;    Electrical Stimulation:         mins  96931 ( );  Iontophoresis         mins 78354;  Aquatic Therapy         mins 39918;  Dry Needling                   mins /  (Self-pay)    Untimed:  Electrical Stimulation:         mins  70818 ( );  Traction:         mins  59461;     Timed Treatment:   40   mins "   Total Treatment:     40   mins    Ani Mehta PT  Physical Therapist    [place] License:[id number]

## 2022-06-29 ENCOUNTER — TREATMENT (OUTPATIENT)
Dept: PHYSICAL THERAPY | Facility: CLINIC | Age: 87
End: 2022-06-29

## 2022-06-29 DIAGNOSIS — R26.89 BALANCE PROBLEM: Primary | ICD-10-CM

## 2022-06-29 DIAGNOSIS — R29.6 MULTIPLE FALLS: ICD-10-CM

## 2022-06-29 PROCEDURE — 97110 THERAPEUTIC EXERCISES: CPT | Performed by: PHYSICAL THERAPIST

## 2022-06-29 PROCEDURE — 97116 GAIT TRAINING THERAPY: CPT | Performed by: PHYSICAL THERAPIST

## 2022-06-29 PROCEDURE — 97530 THERAPEUTIC ACTIVITIES: CPT | Performed by: PHYSICAL THERAPIST

## 2022-06-29 NOTE — PROGRESS NOTES
"Physical Therapy Daily Treatment Note/DISCHARGE    Patient: Jasson Gonzalez   : 1935  Diagnosis/ICD-10 Code:  Balance problem [R26.89]  Referring practitioner: Holly Kincaid MD  Date of Initial Visit: Type: THERAPY  Noted: 10/20/2021  Today's Date: 2022  Patient seen for 41 sessions           Subjective   Jassno reports he is going to try to stay active on his own.    Objective     EXERCISES  -Walk track 1 lap x 3: emphasis on arm swing/heel toe gait. Cueing to avoid forward trunk lean and to straighten up spine  -1K Leg Press, 80 lbs B LE x20, S/L x20   -6K hip abduction machine, 50 lbs, 3x15   - 2\" Step ups x20 each  -NuStep L5, 5 min  -Mini Squats 10x    -Walking marches at rail x20 marches  -Side step along rail x 20 steps  -Heel raises x20   -Cable side step with 7.5 lbs resistance, x4 each direction, min A for safety.  -Sit to stand no hands off elevated mat table x10 *defer       Assessment/Plan  Jasson had a little better endurance with ambulation today and once again brought his rollator. He does continue to report somewhat poor compliance with home exercise therefore continued to emphasize the importance of regular strength training and walking to prevent deconditioning. At this time plan to D/C skilled PT and will encourage patient to return in fall/winter if additional therapy is needed.     Short Term Goals for completion in 30 days:   -Patient will report compliance and independence with initial HEP (MET)  -Patient will improve ability to complete 6 alternating toe taps to increase LE coordination (NOT MET- needs UE support)    LTGs for completion within 90 days:  -Patient will demonstrate sit to stand without use of hands in order to increase functional strength (NOT MET)  -Patient will increase ROSE from 23 to 30 to reduce risk of falls (NOT MET)  -Patient will improve 5xSTS from 13.1 seconds to </=11 seconds in order to decrease risk of falls and increase functional strength (NOT " MET)  -Patient will increase LE knee extension strength to 4-/5 or greater to increase LE stability during gait (NOT MET)         Timed:    Manual Therapy:         mins  62822;  Therapeutic Exercise:    20     mins  47719;     Neuromuscular Salvador:        mins  03609;    Therapeutic Activity:     10     mins  81034;     Gait Training:      10     mins  72995;     Ultrasound:          mins  38079;    Electrical Stimulation:         mins  89814 ( );  Iontophoresis         mins 66800;  Aquatic Therapy         mins 87880;  Dry Needling                   mins 20560/ 20561 (Self-pay)    Untimed:  Electrical Stimulation:         mins  40999 ( );  Traction:         mins  87077;     Timed Treatment:   40   mins   Total Treatment:     40   mins    Ani Mehta PT  Physical Therapist    [place] License:[id number]

## 2022-07-25 DIAGNOSIS — E78.5 HYPERLIPIDEMIA, UNSPECIFIED HYPERLIPIDEMIA TYPE: ICD-10-CM

## 2022-07-25 RX ORDER — ATORVASTATIN CALCIUM 20 MG/1
TABLET, FILM COATED ORAL
Qty: 90 TABLET | Refills: 1 | Status: SHIPPED | OUTPATIENT
Start: 2022-07-25 | End: 2023-03-06

## 2022-07-26 ENCOUNTER — TELEPHONE (OUTPATIENT)
Dept: FAMILY MEDICINE CLINIC | Facility: CLINIC | Age: 87
End: 2022-07-26

## 2022-07-26 NOTE — TELEPHONE ENCOUNTER
Caller: Christiane Matthew    Relationship to patient: Emergency Contact    Best call back number: [phone]    Type of visit: SUBSEQUENT MEDICARE WELLNESS    Requested date: AS SOON AS AVAILABLE    If rescheduling, when is the original appointment: 7/14/22    Additional notes: PATIENT'S WIFE STATES THEY MISSED HIS INITIAL APPOINTMENT, AND ARE REQUESTING TO RESCHEDULE AS SOON AS AVAILABLE. CURRENTLY NO APPOINTMENTS AVAILABLE UNTIL February. REQUEST A CALL BACK TO SCHEDULE SOONER.

## 2022-08-24 ENCOUNTER — TELEPHONE (OUTPATIENT)
Dept: NEUROLOGY | Facility: CLINIC | Age: 87
End: 2022-08-24

## 2022-08-25 ENCOUNTER — TELEPHONE (OUTPATIENT)
Dept: NEUROLOGY | Facility: CLINIC | Age: 87
End: 2022-08-25

## 2022-08-25 ENCOUNTER — HOSPITAL ENCOUNTER (OUTPATIENT)
Dept: INFUSION THERAPY | Facility: HOSPITAL | Age: 87
Discharge: HOME | End: 2022-08-25
Admitting: PSYCHIATRY & NEUROLOGY

## 2022-08-25 DIAGNOSIS — G60.9 IDIOPATHIC PERIPHERAL NEUROPATHY: ICD-10-CM

## 2022-08-25 PROCEDURE — 95909 NRV CNDJ TST 5-6 STUDIES: CPT | Performed by: PSYCHIATRY & NEUROLOGY

## 2022-08-25 PROCEDURE — 95909 NRV CNDJ TST 5-6 STUDIES: CPT

## 2022-08-25 PROCEDURE — 95886 MUSC TEST DONE W/N TEST COMP: CPT

## 2022-08-25 PROCEDURE — 95886 MUSC TEST DONE W/N TEST COMP: CPT | Performed by: PSYCHIATRY & NEUROLOGY

## 2022-08-25 NOTE — PROCEDURES
EMG and Nerve Conduction Studies    I.      Instrument used: Neuromax 1002  II.     Please see data sheets for tabular summary of NCS and details on methods, temperatures and lab standards.   III.    EMG muscles tested for upper extremity studies include the deltoid, biceps, triceps, pronator teres, extensor digitorum communis, first dorsal interosseous and abductor pollicis brevis.    IV.   EMG muscles tested for lower extremity studies include the vastus lateralis, tibialis anterior, peroneus longus, medial gastrocnemius and extensor digitorum brevis.    V.    Additional muscles tested as needed.  Paraspinal muscles tested as needed.   VI.   Please see data sheets for tabular summary of EMG findings.   VII. The complete report includes the data sheets.      Indication: Numbness weakness in the legs particularly weakness of the right quads  History: 87-year-old male with numbness and weakness in the legs with the right quadriceps being severely weak.  He has had bilateral knee replacements some years ago.  He is on Eliquis      Ht: 177.8 cm  Wt: 64.9 kg  HbA1C: No results found for: HGBA1C  TSH:   Lab Results   Component Value Date    TSH 2.450 09/21/2021       Technical summary:  Nerve conduction studies were obtained in the right leg with 1 comparison on the left.  Skin temperatures were initially extremely cold and his feet were difficult to warm.  Temperature correction was not needed however.  Needle examination was obtained on selected muscles in both legs.    Results:  1.  Absent right sural sensory potential.  2.  Absent superficial peroneal sensory potentials bilaterally.  3.  Absent right peroneal motor potentials from proximal and distal stimulation sites recorded over the extensor digitorum brevis.  The study was repeated recording over the tibialis anterior showing a mildly slow velocity of 37.5 m/s with a normal amplitude and no evidence of motor conduction block proximally.  4.  Slow right tibial  motor velocity at 34.6 m/s with a normal distal latency but very low amplitude of 0.592 mV.  No evidence of conduction block proximally.  5.  Needle examination of selected muscles in both legs showed multiple abnormalities.  There were fibrillations and/or positive sharp waves in the right vastus lateralis, tibialis anterior and both peroneus longus and medial gastrocnemius muscles.  There was an increased number of large motor units in all of these muscles with increased firing rates and reduced interference patterns with severe recruitment changes in the right vastus lateralis.  The left vastus lateralis showed normal insertional activities, motor units and recruitment.  The left tibialis anterior showed normal insertional activities with an increased number of large motor units increased firing rate and reduced interference pattern.  The extensor digitorum brevis showed no insertional activity or motor units.  The right iliopsoas showed normal insertional activities with an increased number of large motor units increased firing rate and reduced interference pattern.  There were a few positive sharp waves in the right adductor longus with increased number of large motor units increased firing rate and reduced interference pattern.  Lumbar paraspinals were not studied since the patient is on Eliquis.    Impression:  Abnormal study showing severe polyneuropathy.  There are widespread needle exam changes which are consistent with the nerve conduction findings however there are proximal muscles in the right thigh which are abnormal also quite severely so with the vastus lateralis which is likely due to a superimposed femoral neuropathy although I cannot exclude a superimposed L4 radiculopathy instead.  Clinical correlation is suggested.  Study results were discussed with the patient.    Asael Sepulveda M.D.        Addendum:  I reminded the patient he needed to obtain labs which she had not obtained yet as well as his  MRI of the lumbar spine which he states no one called him on.  We will investigate.  Follow-up in the office  GNS    Dictated utilizing Dragon dictation.

## 2022-08-25 NOTE — TELEPHONE ENCOUNTER
I saw this patient for his EMG today at the hospital.  He had an MRI of the lumbar spine ordered but not done.  He states he was never contacted.    Also he did not get his lab tests and I told him to get them as soon as he could.    He will need to follow-up with me or one of the nurse practitioners in the office after his labs and MRI are done.    STUART

## 2022-08-26 NOTE — TELEPHONE ENCOUNTER
Called pt. LVM. The referral states apt scheudled how it looks like he did not show up to that apt. I gave him scheduling number for [facility]. I explained once he has all testing scheduled and has had labs completed call for an apt. I also said for him to call if he had any questions. If pt calls back. Please provide  him with scheduling phone number [phone]. He can have labs completed at any [facility] facility. Please make sure pt knows to call and have a follow up scheduled. Dr. Sepulveda is very far out for follow ups pt can see one of NP or Suri per DR. Sepulveda. Thank you.

## 2022-10-10 ENCOUNTER — TELEPHONE (OUTPATIENT)
Dept: NEUROLOGY | Facility: CLINIC | Age: 87
End: 2022-10-10

## 2022-10-10 NOTE — TELEPHONE ENCOUNTER
Provider: DR. MORALES  Caller: MOE ORELLANA  Relationship to Patient: PT'S DAUGHTER  Phone Number: [phone] LEAVE A MESSAGE    Reason for Call: PT IS GOING TO HAVE MRI LUMBAR DONE ON 10-12-22 AND ALSO IS GOING TO HAVE THE LABS DRAWN TO. PT'S DAUGHTER IS ASKING IF THE PT NEEDS TO NOT EAT BEFORE BLOOD WORK OR OK TO EAT AND HAVE BLOOD WORK DONE?  PLEASE C ALL HER BACK

## 2022-10-12 ENCOUNTER — LAB (OUTPATIENT)
Dept: LAB | Facility: HOSPITAL | Age: 87
End: 2022-10-12

## 2022-10-12 ENCOUNTER — HOSPITAL ENCOUNTER (OUTPATIENT)
Dept: MRI IMAGING | Facility: HOSPITAL | Age: 87
Discharge: HOME | End: 2022-10-12

## 2022-10-12 DIAGNOSIS — G60.9 IDIOPATHIC PERIPHERAL NEUROPATHY: ICD-10-CM

## 2022-10-12 LAB
ERYTHROCYTE [SEDIMENTATION RATE] IN BLOOD: 10 MM/HR (ref 0–20)
HBA1C MFR BLD: 5.1 % (ref 4.8–5.6)
RPR SER QL: NORMAL

## 2022-10-12 PROCEDURE — 82525 ASSAY OF COPPER: CPT

## 2022-10-12 PROCEDURE — 82595 ASSAY OF CRYOGLOBULIN: CPT

## 2022-10-12 PROCEDURE — 86334 IMMUNOFIX E-PHORESIS SERUM: CPT

## 2022-10-12 PROCEDURE — 86592 SYPHILIS TEST NON-TREP QUAL: CPT | Performed by: PSYCHIATRY & NEUROLOGY

## 2022-10-12 PROCEDURE — 83825 ASSAY OF MERCURY: CPT | Performed by: PSYCHIATRY & NEUROLOGY

## 2022-10-12 PROCEDURE — 84165 PROTEIN E-PHORESIS SERUM: CPT | Performed by: PSYCHIATRY & NEUROLOGY

## 2022-10-12 PROCEDURE — 85652 RBC SED RATE AUTOMATED: CPT | Performed by: PSYCHIATRY & NEUROLOGY

## 2022-10-12 PROCEDURE — 83655 ASSAY OF LEAD: CPT | Performed by: PSYCHIATRY & NEUROLOGY

## 2022-10-12 PROCEDURE — 72148 MRI LUMBAR SPINE W/O DYE: CPT

## 2022-10-12 PROCEDURE — 84155 ASSAY OF PROTEIN SERUM: CPT | Performed by: PSYCHIATRY & NEUROLOGY

## 2022-10-12 PROCEDURE — 36415 COLL VENOUS BLD VENIPUNCTURE: CPT

## 2022-10-12 PROCEDURE — 82175 ASSAY OF ARSENIC: CPT | Performed by: PSYCHIATRY & NEUROLOGY

## 2022-10-12 PROCEDURE — 83036 HEMOGLOBIN GLYCOSYLATED A1C: CPT | Performed by: PSYCHIATRY & NEUROLOGY

## 2022-10-12 PROCEDURE — 82784 ASSAY IGA/IGD/IGG/IGM EACH: CPT

## 2022-10-13 LAB — COPPER SERPL-MCNC: 102 UG/DL (ref 69–132)

## 2022-10-14 ENCOUNTER — OFFICE VISIT (OUTPATIENT)
Dept: FAMILY MEDICINE CLINIC | Facility: CLINIC | Age: 87
End: 2022-10-14

## 2022-10-14 VITALS
HEART RATE: 59 BPM | BODY MASS INDEX: 20.76 KG/M2 | SYSTOLIC BLOOD PRESSURE: 124 MMHG | DIASTOLIC BLOOD PRESSURE: 62 MMHG | TEMPERATURE: 97.8 F | WEIGHT: 145 LBS | OXYGEN SATURATION: 98 % | HEIGHT: 70 IN

## 2022-10-14 DIAGNOSIS — I10 HYPERTENSION, ESSENTIAL: ICD-10-CM

## 2022-10-14 DIAGNOSIS — Z00.00 MEDICARE ANNUAL WELLNESS VISIT, SUBSEQUENT: Primary | ICD-10-CM

## 2022-10-14 DIAGNOSIS — E78.41 ELEVATED LIPOPROTEIN(A): ICD-10-CM

## 2022-10-14 DIAGNOSIS — I48.21 PERMANENT ATRIAL FIBRILLATION: ICD-10-CM

## 2022-10-14 LAB
ALBUMIN SERPL ELPH-MCNC: 3.7 G/DL (ref 2.9–4.4)
ALBUMIN/GLOB SERPL: 1.3 {RATIO} (ref 0.7–1.7)
ALPHA1 GLOB SERPL ELPH-MCNC: 0.3 G/DL (ref 0–0.4)
ALPHA2 GLOB SERPL ELPH-MCNC: 0.8 G/DL (ref 0.4–1)
B-GLOBULIN SERPL ELPH-MCNC: 0.9 G/DL (ref 0.7–1.3)
GAMMA GLOB SERPL ELPH-MCNC: 0.7 G/DL (ref 0.4–1.8)
GLOBULIN SER CALC-MCNC: 2.8 G/DL (ref 2.2–3.9)
IGA SERPL-MCNC: 169 MG/DL (ref 61–437)
IGG SERPL-MCNC: 725 MG/DL (ref 603–1613)
IGM SERPL-MCNC: 26 MG/DL (ref 15–143)
LABORATORY COMMENT REPORT: NORMAL
M PROTEIN SERPL ELPH-MCNC: NORMAL G/DL
PROT PATTERN SERPL ELPH-IMP: NORMAL
PROT PATTERN SERPL IFE-IMP: NORMAL
PROT SERPL-MCNC: 6.5 G/DL (ref 6–8.5)

## 2022-10-14 PROCEDURE — 1170F FXNL STATUS ASSESSED: CPT | Performed by: FAMILY MEDICINE

## 2022-10-14 PROCEDURE — 1159F MED LIST DOCD IN RCRD: CPT | Performed by: FAMILY MEDICINE

## 2022-10-14 PROCEDURE — G0439 PPPS, SUBSEQ VISIT: HCPCS | Performed by: FAMILY MEDICINE

## 2022-10-14 PROCEDURE — 1126F AMNT PAIN NOTED NONE PRSNT: CPT | Performed by: FAMILY MEDICINE

## 2022-10-14 RX ORDER — CETIRIZINE HYDROCHLORIDE 10 MG/1
10 TABLET ORAL DAILY
COMMUNITY
End: 2022-11-17

## 2022-10-14 NOTE — PROGRESS NOTES
The ABCs of the Annual Wellness Visit  Subsequent Medicare Wellness Visit    Chief Complaint   Patient presents with   • Medicare Wellness-subsequent     No complains  doing fair       Subjective    History of Present Illness:  Jasson Gonzalez is a 87 y.o. male who presents for a Subsequent Medicare Wellness Visit.    The following portions of the patient's history were reviewed and   updated as appropriate: allergies, current medications, past family history, past medical history, past social history, past surgical history and problem list.    Compared to one year ago, the patient feels his physical   health is worse.    Compared to one year ago, the patient feels his mental   health is the same.    Recent Hospitalizations:  He was not admitted to the hospital during the last year.       Current Medical Providers:  Patient Care Team:  Holly Kincaid MD as PCP - General (Family Medicine)    Outpatient Medications Prior to Visit   Medication Sig Dispense Refill   • atenolol (TENORMIN) 25 MG tablet TAKE ONE TABLET BY MOUTH DAILY 90 tablet 1   • atorvastatin (LIPITOR) 20 MG tablet TAKE ONE TABLET BY MOUTH DAILY 90 tablet 1   • cetirizine (zyrTEC) 10 MG tablet Take 1 tablet by mouth Daily.     • donepezil (ARICEPT) 10 MG tablet Take 1 tablet by mouth Every Night. 90 tablet 1   • Eliquis 5 MG tablet tablet TAKE ONE TABLET BY MOUTH EVERY 12 HOURS 60 tablet 11   • multivitamin with minerals tablet tablet Take 1 tablet by mouth Daily.     • ramipril (ALTACE) 10 MG capsule TAKE ONE CAPSULE BY MOUTH DAILY 90 capsule 1   • zolpidem (AMBIEN) 5 MG tablet Take 1 tablet by mouth At Night As Needed for Sleep. 30 tablet 0     No facility-administered medications prior to visit.       No opioid medication identified on active medication list. I have reviewed chart for other potential  high risk medication/s and harmful drug interactions in the elderly.          Aspirin is not on active medication list.  Aspirin use is not  "indicated based on review of current medical condition/s. Risk of harm outweighs potential benefits.  .    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  ACP discussion was held with the patient during this visit. Patient has an advance directive (not in EMR), copy requested.          Objective    Vitals:    10/14/22 1317   BP: 124/62   Pulse: 59   Temp: 97.8 °F (36.6 °C)   SpO2: 98%   Weight: 65.8 kg (145 lb)   Height: 177.8 cm (70\")   PainSc: 0-No pain     Estimated body mass index is 20.81 kg/m² as calculated from the following:    Height as of this encounter: 177.8 cm (70\").    Weight as of this encounter: 65.8 kg (145 lb).    BMI is within normal parameters. No other follow-up for BMI required.      Does the patient have evidence of cognitive impairment? No    Physical Exam  Lab Results   Component Value Date    HGBA1C 5.10 10/12/2022            HEALTH RISK ASSESSMENT    Smoking Status:  Social History     Tobacco Use   Smoking Status Never   Smokeless Tobacco Never     Alcohol Consumption:  Social History     Substance and Sexual Activity   Alcohol Use No     Fall Risk Screen:    STEADI Fall Risk Assessment was completed, and patient is at HIGH risk for falls. Assessment completed on:10/14/2022    Depression Screening:  PHQ-2/PHQ-9 Depression Screening 10/14/2022   Retired Total Score -   Little Interest or Pleasure in Doing Things 0-->not at all   Feeling Down, Depressed or Hopeless 0-->not at all   PHQ-9: Brief Depression " Severity Measure Score 0       Health Habits and Functional and Cognitive Screening:  Functional & Cognitive Status 10/14/2022   Do you have difficulty preparing food and eating? No   Do you have difficulty bathing yourself, getting dressed or grooming yourself? No   Do you have difficulty using the toilet? No   Do you have difficulty moving around from place to place? No   Do you have trouble with steps or getting out of a bed or a chair? Yes   Current Diet Well Balanced Diet   Dental Exam Up to date   Eye Exam Up to date   Exercise (times per week) 3 times per week   Current Exercises Include Walking   Do you need help using the phone?  No   Are you deaf or do you have serious difficulty hearing?  No   Do you need help with transportation? No   Do you need help shopping? No   Do you need help preparing meals?  No   Do you need help with housework?  No   Do you need help with laundry? No   Do you need help taking your medications? No   Do you need help managing money? No   Do you ever drive or ride in a car without wearing a seat belt? No   Have you felt unusual stress, anger or loneliness in the last month? No   Who do you live with? Spouse   If you need help, do you have trouble finding someone available to you? No   Have you been bothered in the last four weeks by sexual problems? No   Do you have difficulty concentrating, remembering or making decisions? Yes       Age-appropriate Screening Schedule:  Refer to the list below for future screening recommendations based on patient's age, sex and/or medical conditions. Orders for these recommended tests are listed in the plan section. The patient has been provided with a written plan.    Health Maintenance   Topic Date Due   • INFLUENZA VACCINE  08/01/2022   • LIPID PANEL  09/21/2022   • TDAP/TD VACCINES (2 - Tdap) 11/08/2030   • ZOSTER VACCINE  Completed              Assessment & Plan   CMS Preventative Services Quick Reference  Risk Factors Identified During  Encounter  Cardiovascular Disease  Dementia/Memory   Fall Risk-High or Moderate  Inadequate Social Support, Isolation, Loneliness, Lack of Transportation, Financial Difficulties, or Caregiver Stress   Inactivity/Sedentary  The above risks/problems have been discussed with the patient.  Follow up actions/plans if indicated are seen below in the Assessment/Plan Section.  Pertinent information has been shared with the patient in the After Visit Summary.    Diagnoses and all orders for this visit:    1. Medicare annual wellness visit, subsequent (Primary)    2. Permanent atrial fibrillation (HCC)    3. Elevated lipoprotein(a)  -     Comprehensive Metabolic Panel  -     CBC & Differential  -     Lipid Panel  -     TSH Rfx On Abnormal To Free T4    4. Hypertension, essential  -     Comprehensive Metabolic Panel  -     CBC & Differential  -     Lipid Panel  -     TSH Rfx On Abnormal To Free T4      Here for annual exam, fasting labs ordered as above, he had recent labs performed with neurology which were reassuring, immunizations up-to-date, colon cancer screening not indicated at this time, cardiovascular screening up-to-date, counseled patient to increase vegetable intake, water and 150 minutes of exercise weekly combining weightbearing exercises and aerobic activity.    He has been having more balance issues and some memory concerns.  He did have a stroke.  He is here today with his wife and daughter Tia.  We discussed maintaining good protein intake, water, staying upright and exercising.  Avoiding activities that may be hazardous to him like mowing if he is having difficulty.  Also I recommended that they make an appointment with Dr. Sepulveda to follow-up for neurologic testing had recently.    Prior to next visit he will need CMP, CBC    Follow Up:   Return in about 6 months (around 4/14/2023), or if symptoms worsen or fail to improve, for Recheck HTN with labs prior .     An After Visit Summary and PPPS were made  available to the patient.

## 2022-10-15 LAB
ALBUMIN SERPL-MCNC: 4.4 G/DL (ref 3.5–5.2)
ALBUMIN/GLOB SERPL: 2.8 G/DL
ALP SERPL-CCNC: 96 U/L (ref 39–117)
ALT SERPL-CCNC: 18 U/L (ref 1–41)
AST SERPL-CCNC: 25 U/L (ref 1–40)
BASOPHILS # BLD AUTO: 0.05 10*3/MM3 (ref 0–0.2)
BASOPHILS NFR BLD AUTO: 0.4 % (ref 0–1.5)
BILIRUB SERPL-MCNC: 0.3 MG/DL (ref 0–1.2)
BUN SERPL-MCNC: 21 MG/DL (ref 8–23)
BUN/CREAT SERPL: 20.4 (ref 7–25)
CALCIUM SERPL-MCNC: 9.2 MG/DL (ref 8.6–10.5)
CHLORIDE SERPL-SCNC: 102 MMOL/L (ref 98–107)
CHOLEST SERPL-MCNC: 109 MG/DL (ref 0–200)
CO2 SERPL-SCNC: 23.7 MMOL/L (ref 22–29)
CREAT SERPL-MCNC: 1.03 MG/DL (ref 0.76–1.27)
EGFRCR SERPLBLD CKD-EPI 2021: 70.7 ML/MIN/1.73
EOSINOPHIL # BLD AUTO: 0.17 10*3/MM3 (ref 0–0.4)
EOSINOPHIL NFR BLD AUTO: 1.5 % (ref 0.3–6.2)
ERYTHROCYTE [DISTWIDTH] IN BLOOD BY AUTOMATED COUNT: 12.8 % (ref 12.3–15.4)
GLOBULIN SER CALC-MCNC: 1.6 GM/DL
GLUCOSE SERPL-MCNC: 99 MG/DL (ref 65–99)
HCT VFR BLD AUTO: 37.7 % (ref 37.5–51)
HDLC SERPL-MCNC: 37 MG/DL (ref 40–60)
HGB BLD-MCNC: 12.7 G/DL (ref 13–17.7)
IMM GRANULOCYTES # BLD AUTO: 0.04 10*3/MM3 (ref 0–0.05)
IMM GRANULOCYTES NFR BLD AUTO: 0.3 % (ref 0–0.5)
LDLC SERPL CALC-MCNC: 52 MG/DL (ref 0–100)
LYMPHOCYTES # BLD AUTO: 1.21 10*3/MM3 (ref 0.7–3.1)
LYMPHOCYTES NFR BLD AUTO: 10.4 % (ref 19.6–45.3)
MCH RBC QN AUTO: 31.3 PG (ref 26.6–33)
MCHC RBC AUTO-ENTMCNC: 33.7 G/DL (ref 31.5–35.7)
MCV RBC AUTO: 92.9 FL (ref 79–97)
MONOCYTES # BLD AUTO: 0.74 10*3/MM3 (ref 0.1–0.9)
MONOCYTES NFR BLD AUTO: 6.4 % (ref 5–12)
NEUTROPHILS # BLD AUTO: 9.37 10*3/MM3 (ref 1.7–7)
NEUTROPHILS NFR BLD AUTO: 81 % (ref 42.7–76)
NRBC BLD AUTO-RTO: 0 /100 WBC (ref 0–0.2)
PLATELET # BLD AUTO: 241 10*3/MM3 (ref 140–450)
POTASSIUM SERPL-SCNC: 4.4 MMOL/L (ref 3.5–5.2)
PROT SERPL-MCNC: 6 G/DL (ref 6–8.5)
RBC # BLD AUTO: 4.06 10*6/MM3 (ref 4.14–5.8)
SODIUM SERPL-SCNC: 137 MMOL/L (ref 136–145)
TRIGL SERPL-MCNC: 110 MG/DL (ref 0–150)
TSH SERPL DL<=0.005 MIU/L-ACNC: 2.41 UIU/ML (ref 0.27–4.2)
VLDLC SERPL CALC-MCNC: 20 MG/DL (ref 5–40)
WBC # BLD AUTO: 11.58 10*3/MM3 (ref 3.4–10.8)

## 2022-10-17 LAB
ARSENIC BLD-MCNC: 1 UG/L (ref 0–9)
CRYOGLOB SER QL 1D COLD INC: NORMAL
LEAD BLDV-MCNC: 2.5 UG/DL (ref 0–3.4)
MERCURY BLD-MCNC: <1 UG/L (ref 0–14.9)

## 2022-10-25 ENCOUNTER — TELEPHONE (OUTPATIENT)
Dept: NEUROLOGY | Facility: CLINIC | Age: 87
End: 2022-10-25

## 2022-10-25 NOTE — TELEPHONE ENCOUNTER
----- Message from Asael Sepulveda MD sent at 10/21/2022  6:55 PM EDT -----  MRI lumbar spine demonstrates multilevel severe spinal stenosis.    No follow-up is seen scheduled in the office.    Kera,  Patient needs a follow-up with one of the nurse practitioners    GNS

## 2022-11-17 ENCOUNTER — OFFICE VISIT (OUTPATIENT)
Dept: NEUROLOGY | Facility: CLINIC | Age: 87
End: 2022-11-17

## 2022-11-17 VITALS
HEART RATE: 64 BPM | HEIGHT: 70 IN | OXYGEN SATURATION: 98 % | SYSTOLIC BLOOD PRESSURE: 141 MMHG | BODY MASS INDEX: 20.76 KG/M2 | WEIGHT: 145 LBS | DIASTOLIC BLOOD PRESSURE: 63 MMHG

## 2022-11-17 DIAGNOSIS — M54.17 RADICULOPATHY, LUMBOSACRAL REGION: Primary | ICD-10-CM

## 2022-11-17 PROCEDURE — 99214 OFFICE O/P EST MOD 30 MIN: CPT | Performed by: PHYSICIAN ASSISTANT

## 2022-11-17 RX ORDER — AZELASTINE 1 MG/ML
2 SPRAY, METERED NASAL 2 TIMES DAILY
Qty: 1 EACH | Refills: 12 | Status: SHIPPED | OUTPATIENT
Start: 2022-11-17

## 2022-11-17 RX ORDER — MONTELUKAST SODIUM 10 MG/1
10 TABLET ORAL DAILY
Qty: 30 TABLET | Refills: 2 | Status: SHIPPED | OUTPATIENT
Start: 2022-11-17 | End: 2023-02-14

## 2022-11-17 NOTE — PROGRESS NOTES
CC: Follow-up    HPI:  Jasson Gonzalez is a  87 y.o. right-handed male with past medical history of atrial fibrillation on anticoagulation, hyperlipidemia, hypertension, macular degeneration who came to neurology clinic last spring.  Reviewed prior documentation and made revisions as follows:  He was referred by Dr. Kincaid for Alzheimer's disease.  They were describing memory trouble for about a year that has been slow and progressive.  An MRI of the brain was obtained 10/29/2021 demonstrating some atrophy, small vessel change and a subacute infarct in the right parietal area.  Other labs included TSH of 2.45, B12 level of 429, and folate greater than 20.  He still drives some generally by himself because his wife says she does not trust him.  Back then he was performing his own ADLs, getting up and cleaning himself up, doing chores such as taking the garbage and riding lawnmower.  Wife was anxious when he is on the tractor because 1 time he got off the tractor to  a stick and fell and the neighbors had to help him get up.  Then he was having trouble with balance and was doing PT and a Rollator was recommended.  He had lumbar MRI, polyneuropathy labs, and EMG and results are as follows    EMG 8/25  Impression:  Abnormal study showing severe polyneuropathy.  There are widespread needle exam changes which are consistent with the nerve conduction findings however there are proximal muscles in the right thigh which are abnormal also quite severely so with the vastus lateralis which is likely due to a superimposed femoral neuropathy although I cannot exclude a superimposed L4 radiculopathy instead.  Clinical correlation is suggested.  Study results were discussed with the patient.    TSH 2.41  cyroglobulin none detected  Copper, heavy metals nml at 102, lead/aresenic/mercury nml  Sed 10  RPR non reactive  SPEP, NILS, no monoclonality  HA1C 5.1  B12/folate 429/> 20    Lumbar spine MRI  IMPRESSION:     Multilevel  "degenerative phenomena are appreciated within the lumbar  spine and these findings result in severe central canal stenosis at the  L4-L5 and L3-L4 levels. Other lesser degrees of multilevel canal  narrowing are noted within the remaining lumbar spine as discussed in  detail above.     Additionally, there is multilevel foraminal stenosis within the lumbar  spine and this includes severe left L1-L2, moderate right L1-L2,  mild-to-moderate right L2-L3, moderate left L2-L3, moderate-to-severe  bilateral L3-L4, severe right and mild-to-moderate left L4-L5, moderate  right L5-S1, and severe left L5-S1 foraminal stenosis.     There are bilateral pars defects at the L5 level with anterior  spondylolisthesis of L5 on S1 by approximately 3 mm.    Patient denies pain.  He also denies numbness or tingling sensation.  Daughter offers that he used to take medication for arthritis but does no longer.  They do bring up that he seems confused in the morning and is better as the day goes on.  There are no issues sleeping, no vivid dreams, no change in behavior, no hallucinations or agitation.  Wife has been reminding him to get dressed.  She says that he comments \"I do not know\" and much of the conversation.  He still uses the lawnmower to go get the mail.  No reported falls.  He is on Aricept 7.5 and is tolerating just fine.         Social history:    Family history:      Pain Scale:        ROS:  Review of Systems   Constitutional: Negative for appetite change, fatigue and unexpected weight change.   HENT: Negative for ear pain, hearing loss, nosebleeds and tinnitus.    Eyes: Negative for photophobia, pain and visual disturbance.   Respiratory: Negative for choking, chest tightness, shortness of breath and wheezing.    Cardiovascular: Negative for chest pain and palpitations.   Gastrointestinal: Negative for constipation, nausea and vomiting.   Endocrine: Positive for cold intolerance. Negative for heat intolerance.   Genitourinary: " "Positive for difficulty urinating.   Musculoskeletal: Positive for gait problem (walker). Negative for back pain, neck pain and neck stiffness.   Skin: Negative for rash and wound.   Allergic/Immunologic: Negative for food allergies.   Neurological: Negative for dizziness, tremors, syncope, weakness, light-headedness, numbness and headaches.   Hematological: Bruises/bleeds easily.   Psychiatric/Behavioral: Positive for agitation and confusion. Negative for behavioral problems, decreased concentration and sleep disturbance. The patient is nervous/anxious.          Reviewed ROS conducted by MA and ghazal        Physical Exam:  Vitals:    11/17/22 1408   BP: 141/63   Pulse: 64   SpO2: 98%   Weight: 65.8 kg (145 lb)   Height: 177.8 cm (70\")     Orthostatic BP:    Body mass index is 20.81 kg/m².    Recycled BP and systolic 118/60, not orthostatic    General: pt well appearing, no distress  HEENT: Normocephalic, conjunctiva normal, external canals normal, no nasal discharge, moist mucous membranes  Neck: No lymphadenopathy, thyroid not enlarged, no JVD  CV: Regular rate and rhythm, no murmurs negative no bruits auscultated at neck, equal pulses  Pulmonary: Normal respiratory effort, clear to auscultation bilaterally  Extremities: no edema, bruising, or skin lesions  Pysch: good eye contact, cooperative, full affect, euthymic mood, good attention, good insight  Mental: alert, conversant,  provides history, 3/3 recall.  Language fluent, names, repeats, only names 3 animals and then states though I guess that is enough for now  CN: CN II-XII intact, PERRL, EOMi, no gaze palsy or nystagmus, intact facial sensation, no facial assymetry, intact hearing, symmetric palate elevation  Motor: no abnormal movements, normal tone, no cogwheeling, some atrophy of dorsum of R foot  Left  Deltoid:5  Triceps:5  Biceps:5  Wrist flexor/extensor:4+  Interossei:+4    Iliospoas:4+  Quad:5  Ham:5  Tib ant:4  Tib post:3  Gastroc:4  Abd hallucis " brevis:4    Right  Deltoid:5  Triceps:5  Biceps:5  Wrist flexor/extensor:4+  Interossei:+4    Iliospoas:4  Quad:4-  Ham:4-  Tib ant:4-  Tib post:4-  Gastroc4-  Abd hallucis brevis:4-        Sensory: Slight decrease in glove and stocking distribution of lower extremities to crude touch and pinprick, significant vibratory loss bilateral lower extremities right more than left moderate  reflexes: Trace throughout, neg babinski  Coordination: no ataxia on finger-nose, heel-shin  Gait: High steppage, forward flexion posture        Results:      Lab Results   Component Value Date    GLUCOSE 99 10/14/2022    BUN 21 10/14/2022    CREATININE 1.03 10/14/2022    EGFRIFNONA 65 09/21/2021    EGFRIFAFRI 79 09/21/2021    BCR 20.4 10/14/2022    CO2 23.7 10/14/2022    CALCIUM 9.2 10/14/2022    PROTENTOTREF 6.0 10/14/2022    ALBUMIN 4.40 10/14/2022    LABIL2 2.8 10/14/2022    AST 25 10/14/2022    ALT 18 10/14/2022       Lab Results   Component Value Date    WBC 11.58 (H) 10/14/2022    HGB 12.7 (L) 10/14/2022    HCT 37.7 10/14/2022    MCV 92.9 10/14/2022     10/14/2022         .  Lab Results   Component Value Date    RPR Non-Reactive 10/12/2022         Lab Results   Component Value Date    TSH 2.410 10/14/2022    H4QVYGP 6.8 07/10/2018         Lab Results   Component Value Date    GICDBZKX14 429 09/21/2021         Lab Results   Component Value Date    FOLATE >20.00 09/21/2021         Lab Results   Component Value Date    HGBA1C 5.10 10/12/2022         Lab Results   Component Value Date    GLUCOSE 99 10/14/2022    BUN 21 10/14/2022    CREATININE 1.03 10/14/2022    EGFRIFNONA 65 09/21/2021    EGFRIFAFRI 79 09/21/2021    BCR 20.4 10/14/2022    K 4.4 10/14/2022    CO2 23.7 10/14/2022    CALCIUM 9.2 10/14/2022    PROTENTOTREF 6.0 10/14/2022    ALBUMIN 4.40 10/14/2022    LABIL2 2.8 10/14/2022    AST 25 10/14/2022    ALT 18 10/14/2022         Diagnosis:  1.  Memory loss  2.  Lumbar stenosis      Impression: 87-year-old right-handed  male with past medical history of A. fib on Eliquis, prior silent stroke of right parietal lobe personally reviewed an MRI brain and there is periventricular white matter and atrophy disease but not significantly so.  He was referred to us for Alzheimer's dementia.  He is already on Aricept 7.5 and apparently did not tolerate full dose for episode of confusion he denies any problem now.  He was having trouble with gait neuropathy suspected and so work-up was pursued.  Laboratories did not reveal reversible etiology and MRI does show significant lumbar stenosis severe at L4/5 and L3/4 and multilevel foraminal stenosis that is severe and places.  EMG showing severe polyneuropathy and possible L4 radiculopathy on R.      Patient has memory loss and I am suspecting early dementia.  He was disinterested in a MoCA today.  In the spring he scored 8 out of 10 on MMSE and clock draw was 1 out of 4, immediate recall 3 out of 3, delayed recall 1 out of 3, names 7 animals in 1.  Today he stopped at 3 animals.  It sounds like there are some issues with ADLs but he is completing them with reminders.  He is watching more TV these days.  We will try to get him to full dose Aricept if he is not already.  I informed him that I would like to do MoCA at next visit.  He is taking Zyrtec every day and we discussed that antihistamines are not favored and this patient profile.  He is definitely disinterested in surgical options related to his lumbar stenosis.  He does have significant exam findings for me today that seem worse than previous with notable weakness and sensory loss bilaterally and worse on the right where there is suspected radiculopathy.  I think it behooves him to retry conservative treatments, physical therapy, and reassess his exam.  I discussed red flag symptoms with him and he will notify our office if these occur      Plan:  1. Physical therapy referral  2. Continue aricept  3. Try singulair and nasal spray.  Stop  antihistamine.  Monitor BP  3.  Heart healthy diet and avoid dehydration, not so many [employer], exercise program, social engagement, good sleep hygiene.  They can do self assessed driving eval and ride with patient    F/u in 4 mos    I spent at least 30 minutes interviewing, examining, and counseling patient.  I independently reviewed documentation, laboratory and diagnostic findings, external documentation where applicable, and formulated treatment plan which was discussed with the patient.

## 2023-02-14 DIAGNOSIS — I48.21 PERMANENT ATRIAL FIBRILLATION: ICD-10-CM

## 2023-02-14 RX ORDER — ATENOLOL 25 MG/1
TABLET ORAL
Qty: 90 TABLET | Refills: 1 | Status: SHIPPED | OUTPATIENT
Start: 2023-02-14

## 2023-02-14 RX ORDER — MONTELUKAST SODIUM 10 MG/1
TABLET ORAL
Qty: 90 TABLET | Refills: 3 | Status: SHIPPED | OUTPATIENT
Start: 2023-02-14 | End: 2023-05-15

## 2023-03-06 DIAGNOSIS — E78.5 HYPERLIPIDEMIA, UNSPECIFIED HYPERLIPIDEMIA TYPE: ICD-10-CM

## 2023-03-06 RX ORDER — ATORVASTATIN CALCIUM 20 MG/1
TABLET, FILM COATED ORAL
Qty: 90 TABLET | Refills: 1 | Status: SHIPPED | OUTPATIENT
Start: 2023-03-06

## 2023-03-07 DIAGNOSIS — G30.1 LATE ONSET ALZHEIMER'S DEMENTIA WITHOUT BEHAVIORAL DISTURBANCE: ICD-10-CM

## 2023-03-07 DIAGNOSIS — F02.80 LATE ONSET ALZHEIMER'S DEMENTIA WITHOUT BEHAVIORAL DISTURBANCE: ICD-10-CM

## 2023-03-07 RX ORDER — DONEPEZIL HYDROCHLORIDE 10 MG/1
TABLET, FILM COATED ORAL
Qty: 90 TABLET | Refills: 1 | Status: SHIPPED | OUTPATIENT
Start: 2023-03-07

## 2023-04-14 ENCOUNTER — OFFICE VISIT (OUTPATIENT)
Dept: FAMILY MEDICINE CLINIC | Facility: CLINIC | Age: 88
End: 2023-04-14
Payer: MEDICARE

## 2023-04-14 VITALS
DIASTOLIC BLOOD PRESSURE: 64 MMHG | HEART RATE: 81 BPM | OXYGEN SATURATION: 96 % | WEIGHT: 140 LBS | TEMPERATURE: 97.8 F | HEIGHT: 70 IN | BODY MASS INDEX: 20.04 KG/M2 | SYSTOLIC BLOOD PRESSURE: 128 MMHG

## 2023-04-14 DIAGNOSIS — R29.898 WEAKNESS OF BOTH LEGS: Primary | ICD-10-CM

## 2023-04-14 DIAGNOSIS — I48.21 PERMANENT ATRIAL FIBRILLATION: ICD-10-CM

## 2023-04-14 DIAGNOSIS — R53.1 GENERALIZED WEAKNESS: ICD-10-CM

## 2023-04-14 DIAGNOSIS — I10 HYPERTENSION, ESSENTIAL: ICD-10-CM

## 2023-04-14 PROCEDURE — 1159F MED LIST DOCD IN RCRD: CPT | Performed by: FAMILY MEDICINE

## 2023-04-14 PROCEDURE — 99214 OFFICE O/P EST MOD 30 MIN: CPT | Performed by: FAMILY MEDICINE

## 2023-04-14 PROCEDURE — 1160F RVW MEDS BY RX/DR IN RCRD: CPT | Performed by: FAMILY MEDICINE

## 2023-04-14 RX ORDER — ATENOLOL 25 MG/1
25 TABLET ORAL DAILY
Qty: 90 TABLET | Refills: 1 | Status: SHIPPED | OUTPATIENT
Start: 2023-04-14

## 2023-04-14 RX ORDER — RAMIPRIL 10 MG/1
10 CAPSULE ORAL DAILY
Qty: 90 CAPSULE | Refills: 1 | Status: SHIPPED | OUTPATIENT
Start: 2023-04-14

## 2023-04-14 NOTE — PROGRESS NOTES
"Chief Complaint  Hypertension (Follow up with labs ,no complains will need refills of the medications )    Subjective        Jasson Gonzalez presents to [facility]  History of Present Illness  Pleasant 88-year-old male here for high blood pressure which is well controlled, atrial fibrillation which is well controlled anticoagulated on Eliquis, no complications with bleeding.  Due for labs today, his last CBC was stable.    He has fallen 3 times a few weeks, he does walk with a rolator walker .  He does try to walk at home, sometimes does not have a good appetite.  He does forget to take protein snacks or supplements.  His wife tries to remember.  He does have late onset Alzheimer's dementia.    Objective   Vital Signs:  /64   Pulse 81   Temp 97.8 °F (36.6 °C)   Ht 177.8 cm (70\")   Wt 63.5 kg (140 lb)   SpO2 96%   BMI 20.09 kg/m²   Estimated body mass index is 20.09 kg/m² as calculated from the following:    Height as of this encounter: 177.8 cm (70\").    Weight as of this encounter: 63.5 kg (140 lb).       BMI is within normal parameters. No other follow-up for BMI required.      Physical Exam  Vitals and nursing note reviewed.   Constitutional:       General: He is not in acute distress.     Appearance: He is well-developed.   HENT:      Head: Normocephalic.      Nose: Nose normal.   Cardiovascular:      Rate and Rhythm: Normal rate and regular rhythm.      Heart sounds: Normal heart sounds. No murmur heard.  Pulmonary:      Effort: Pulmonary effort is normal. No respiratory distress.      Breath sounds: Normal breath sounds.   Musculoskeletal:         General: Normal range of motion.   Skin:     General: Skin is warm and dry.      Findings: No rash.   Neurological:      Mental Status: He is alert and oriented to person, place, and time.   Psychiatric:         Behavior: Behavior normal.         Thought Content: Thought content normal.         Judgment: Judgment normal. "        Result Review :                   Assessment and Plan   Diagnoses and all orders for this visit:    1. Weakness of both legs (Primary)  -     Ambulatory Referral to Physical Therapy Evaluate and treat    2. Hypertension, essential  -     ramipril (ALTACE) 10 MG capsule; Take 1 capsule by mouth Daily.  Dispense: 90 capsule; Refill: 1  -     Comprehensive Metabolic Panel  -     Lipid Panel  -     CBC & Differential    3. Permanent atrial fibrillation  -     apixaban (Eliquis) 5 MG tablet tablet; Take 1 tablet by mouth Every 12 (Twelve) Hours.  Dispense: 60 tablet; Refill: 11  -     atenolol (TENORMIN) 25 MG tablet; Take 1 tablet by mouth Daily.  Dispense: 90 tablet; Refill: 1  -     Comprehensive Metabolic Panel  -     Lipid Panel  -     CBC & Differential    4. Generalized weakness  -     Ambulatory Referral to Physical Therapy Evaluate and treat    Very pleasant 88-year-old male here to follow-up for hypertension which is well controlled, A-fib which is thankfully well controlled, having some progressive weakness, he fell 3 times in the same day 3 weeks ago.  He had no follow-up fall since then, no trauma to the head or other issues.  He was leaning forward to  a stick and lost his balance.  He also has had less of an appetite.    Plan:  - Physical therapy as above to build strength  -Discussed increasing protein intake trying to eat meals 3 times a day or protein supplement if a meal is not possible.  -Continue with same meds as above  -We discussed that if he is starting to fall more often we can consider stopping the Eliquis.  Although this will increase his risk of a clotting stroke I also do not want to increase the risk of a brain bleed.  These are very challenging decisions but there may be a day soon where we will stop his blood thinner.  I am happy to talk further about this.  But I think it would be helpful to see how he responds to PT first.         Follow Up   Return in about 6 months  (around 10/14/2023), or if symptoms worsen or fail to improve, for Medicare Wellness with fasting labs prior.  Patient was given instructions and counseling regarding his condition or for health maintenance advice. Please see specific information pulled into the AVS if appropriate.     Holly Schultz MD

## 2023-04-15 LAB
ALBUMIN SERPL-MCNC: 4.1 G/DL (ref 3.5–5.2)
ALBUMIN/GLOB SERPL: 2.6 G/DL
ALP SERPL-CCNC: 81 U/L (ref 39–117)
ALT SERPL-CCNC: 18 U/L (ref 1–41)
AST SERPL-CCNC: 20 U/L (ref 1–40)
BASOPHILS # BLD AUTO: 0.05 10*3/MM3 (ref 0–0.2)
BASOPHILS NFR BLD AUTO: 0.5 % (ref 0–1.5)
BILIRUB SERPL-MCNC: 0.4 MG/DL (ref 0–1.2)
BUN SERPL-MCNC: 22 MG/DL (ref 8–23)
BUN/CREAT SERPL: 16.4 (ref 7–25)
CALCIUM SERPL-MCNC: 9.3 MG/DL (ref 8.6–10.5)
CHLORIDE SERPL-SCNC: 107 MMOL/L (ref 98–107)
CHOLEST SERPL-MCNC: 101 MG/DL (ref 0–200)
CO2 SERPL-SCNC: 25.8 MMOL/L (ref 22–29)
CREAT SERPL-MCNC: 1.34 MG/DL (ref 0.76–1.27)
EGFRCR SERPLBLD CKD-EPI 2021: 51.3 ML/MIN/1.73
EOSINOPHIL # BLD AUTO: 0.2 10*3/MM3 (ref 0–0.4)
EOSINOPHIL NFR BLD AUTO: 1.8 % (ref 0.3–6.2)
ERYTHROCYTE [DISTWIDTH] IN BLOOD BY AUTOMATED COUNT: 12.8 % (ref 12.3–15.4)
GLOBULIN SER CALC-MCNC: 1.6 GM/DL
GLUCOSE SERPL-MCNC: 99 MG/DL (ref 65–99)
HCT VFR BLD AUTO: 34.8 % (ref 37.5–51)
HDLC SERPL-MCNC: 35 MG/DL (ref 40–60)
HGB BLD-MCNC: 11.4 G/DL (ref 13–17.7)
IMM GRANULOCYTES # BLD AUTO: 0.04 10*3/MM3 (ref 0–0.05)
IMM GRANULOCYTES NFR BLD AUTO: 0.4 % (ref 0–0.5)
LDLC SERPL CALC-MCNC: 42 MG/DL (ref 0–100)
LYMPHOCYTES # BLD AUTO: 1.15 10*3/MM3 (ref 0.7–3.1)
LYMPHOCYTES NFR BLD AUTO: 10.6 % (ref 19.6–45.3)
MCH RBC QN AUTO: 31.4 PG (ref 26.6–33)
MCHC RBC AUTO-ENTMCNC: 32.8 G/DL (ref 31.5–35.7)
MCV RBC AUTO: 95.9 FL (ref 79–97)
MONOCYTES # BLD AUTO: 0.86 10*3/MM3 (ref 0.1–0.9)
MONOCYTES NFR BLD AUTO: 7.9 % (ref 5–12)
NEUTROPHILS # BLD AUTO: 8.57 10*3/MM3 (ref 1.7–7)
NEUTROPHILS NFR BLD AUTO: 78.8 % (ref 42.7–76)
NRBC BLD AUTO-RTO: 0 /100 WBC (ref 0–0.2)
PLATELET # BLD AUTO: 203 10*3/MM3 (ref 140–450)
POTASSIUM SERPL-SCNC: 4.7 MMOL/L (ref 3.5–5.2)
PROT SERPL-MCNC: 5.7 G/DL (ref 6–8.5)
RBC # BLD AUTO: 3.63 10*6/MM3 (ref 4.14–5.8)
SODIUM SERPL-SCNC: 143 MMOL/L (ref 136–145)
TRIGL SERPL-MCNC: 138 MG/DL (ref 0–150)
VLDLC SERPL CALC-MCNC: 24 MG/DL (ref 5–40)
WBC # BLD AUTO: 10.87 10*3/MM3 (ref 3.4–10.8)

## 2023-05-25 DIAGNOSIS — I48.21 PERMANENT ATRIAL FIBRILLATION: ICD-10-CM

## 2023-05-25 NOTE — TELEPHONE ENCOUNTER
Caller: SOPHIA SETHI    Relationship: Emergency Contact    Best call back number: [phone]    Requested Prescriptions:   Requested Prescriptions     Pending Prescriptions Disp Refills   • apixaban (Eliquis) 5 MG tablet tablet 60 tablet 11     Sig: Take 1 tablet by mouth Every 12 (Twelve) Hours.        Pharmacy where request should be sent: [facility] [id number] [place] [id number] [address] AT [facility] [address] - [phone]  - [phone] FX     Last office visit with prescribing clinician: 4/14/2023   Last telemedicine visit with prescribing clinician: Visit date not found   Next office visit with prescribing clinician: 11/3/2023     Additional details provided by patient: STATES PATIENT HAS 10 TABLETS LEFT    Does the patient have less than a 3 day supply:  [] Yes  [x] No    Would you like a call back once the refill request has been completed: [] Yes [x] No    If the office needs to give you a call back, can they leave a voicemail: [] Yes [x] No    Erick Agudelo Rep   05/25/23 11:24 EDT

## 2023-06-08 NOTE — PROGRESS NOTES
REFERRING PROVIDER:    Holly Schultz MD  [address] [place] [address]    REASON FOR CONSULTATION:    Mild leukocytosis with neutrophilia  Mild normocytic anemia    HISTORY OF PRESENT ILLNESS:  Jasson Gonzalez is a 88 y.o. male who is referred today for further evaluation of mild leukocytosis with neutrophilia and mild normocytic anemia.    Recent labs have indicated a white blood cell count of 11.58 with hemoglobin 12.7 on 10/14/2022.  Differential showed 81% neutrophils and 10% lymphocytes.  On 4/14/2023 the white blood cell count was very mildly elevated at 10.87 with a hemoglobin of 11.4.  Again, there was neutrophilia with 79% neutrophils and 11% lymphocytes.  Platelets have been normal.  The MCV has been normal.  He was referred for further evaluation.        He has some mild dementia.  His wife states that he drinks a lot of Pepsi's.  No tobacco use.  He denies any chronic pain issues.  He does have a decreased appetite and has lost some weight.  No fevers chills or other infectious symptoms.  No obvious inflammation.    Past Medical History:   Diagnosis Date    Arthritis     Detached retina     Hyperlipidemia     Hypertension     Leukocytosis, unspecified type     Permanent atrial fibrillation        Past Surgical History:   Procedure Laterality Date    CATARACT EXTRACTION Bilateral     EYE SURGERY      REPLACEMENT TOTAL KNEE      both       SOCIAL HISTORY:   reports that he has never smoked. He has never used smokeless tobacco. He reports that he does not drink alcohol. No history on file for drug use.    FAMILY HISTORY:  family history includes Cancer in his mother.    ALLERGIES:  No Known Allergies    MEDICATIONS:  The medication list has been reviewed with the patient by the medical assistant, and the list has been updated in the electronic medical record, which I reviewed.  Medication dosages and frequencies were confirmed to be accurate.    Review of Systems   Constitutional:   "Positive for appetite change and unexpected weight change.   Psychiatric/Behavioral:  Positive for confusion.    All other systems reviewed and are negative.    PHYSICAL EXAMINATION:  Vitals:    06/09/23 1016   BP: 142/74   Pulse: 71   Resp: 20   Temp: 98 °F (36.7 °C)   TempSrc: Temporal   SpO2: 98%   Weight: 62.5 kg (137 lb 11.2 oz)   Height: 177.8 cm (70\")   PainSc: 0-No pain       Physical Exam  Vitals and nursing note reviewed.   Constitutional:       General: He is not in acute distress.     Appearance: He is well-developed and underweight.   HENT:      Head: Normocephalic and atraumatic. Hair is normal.   Eyes:      General: Lids are normal.      Conjunctiva/sclera: Conjunctivae normal.      Pupils: Pupils are equal.   Neck:      Thyroid: No thyroid mass or thyromegaly.      Vascular: No JVD.   Cardiovascular:      Rate and Rhythm: Normal rate and regular rhythm.      Heart sounds: No murmur heard.    No friction rub. No gallop.   Pulmonary:      Effort: Pulmonary effort is normal. No respiratory distress.      Breath sounds: Normal breath sounds. No wheezing or rales.   Abdominal:      General: There is no distension.      Palpations: Abdomen is soft. There is no hepatomegaly, splenomegaly or mass.      Tenderness: There is no abdominal tenderness. There is no guarding.   Musculoskeletal:         General: No tenderness or deformity. Normal range of motion.      Cervical back: Neck supple.   Lymphadenopathy:      Cervical: No cervical adenopathy.      Upper Body:      Right upper body: No supraclavicular adenopathy.      Left upper body: No supraclavicular adenopathy.   Skin:     General: Skin is warm and dry.      Findings: No rash.   Neurological:      Mental Status: He is alert and oriented to person, place, and time.   Psychiatric:         Behavior: Behavior normal.         Thought Content: Thought content normal.         Judgment: Judgment normal.       DIAGNOSTIC DATA:  CBC & Differential (06/09/2023 " 09:59)     IMAGING:    None reviewed    ASSESSMENT:  This is a 88 y.o. male with:    *Mild leukocytosis with neutrophilia  Recent labs have indicated a white blood cell count of 11.58 with hemoglobin 12.7 on 10/14/2022.  Differential showed 81% neutrophils and 10% lymphocytes.  On 4/14/2023 the white blood cell count was very mildly elevated at 10.87 with a hemoglobin of 11.4.  Again, there was neutrophilia with 79% neutrophils and 11% lymphocytes.  Platelets have been normal.  The MCV has been normal.  He was referred for further evaluation.      6/9/2023: Initial evaluation.  White blood cell count 12.2 with 76% neutrophils and 13% lymphocytes    *Mild normocytic anemia  6/9/2023: Hemoglobin 12.7, hematocrit 36.0%, MCV 90.9    *Mild dementia    PLAN:   His blood counts are just a little off.  I do not think they are anything to worry about at this time.  There is no evidence for leukemia or lymphoma.  Today the patient, his wife, and his daughter elected not to proceed with any further evaluation.  He can follow-up with primary care and see us back as needed.    ORDERS PLACED DURING THIS ENCOUNTER  Orders Placed This Encounter   Procedures    CBC & Differential     Standing Status:   Future     Number of Occurrences:   1     Standing Expiration Date:   6/8/2024     Order Specific Question:   Manual Differential     Answer:   No     Order Specific Question:   Release to patient     Answer:   Routine Release

## 2023-06-09 ENCOUNTER — CONSULT (OUTPATIENT)
Dept: ONCOLOGY | Facility: CLINIC | Age: 88
End: 2023-06-09
Payer: MEDICARE

## 2023-06-09 ENCOUNTER — LAB (OUTPATIENT)
Dept: OTHER | Facility: HOSPITAL | Age: 88
End: 2023-06-09
Payer: MEDICARE

## 2023-06-09 VITALS
HEIGHT: 70 IN | BODY MASS INDEX: 19.71 KG/M2 | HEART RATE: 71 BPM | TEMPERATURE: 98 F | OXYGEN SATURATION: 98 % | SYSTOLIC BLOOD PRESSURE: 142 MMHG | DIASTOLIC BLOOD PRESSURE: 74 MMHG | WEIGHT: 137.7 LBS | RESPIRATION RATE: 20 BRPM

## 2023-06-09 DIAGNOSIS — D72.829 LEUKOCYTOSIS, UNSPECIFIED TYPE: Primary | ICD-10-CM

## 2023-06-09 DIAGNOSIS — D72.829 LEUKOCYTOSIS, UNSPECIFIED TYPE: ICD-10-CM

## 2023-06-09 LAB
BASOPHILS # BLD AUTO: 0.05 10*3/MM3 (ref 0–0.2)
BASOPHILS NFR BLD AUTO: 0.4 % (ref 0–1.5)
DEPRECATED RDW RBC AUTO: 42.7 FL (ref 37–54)
EOSINOPHIL # BLD AUTO: 0.29 10*3/MM3 (ref 0–0.4)
EOSINOPHIL NFR BLD AUTO: 2.4 % (ref 0.3–6.2)
ERYTHROCYTE [DISTWIDTH] IN BLOOD BY AUTOMATED COUNT: 13 % (ref 12.3–15.4)
HCT VFR BLD AUTO: 36 % (ref 37.5–51)
HGB BLD-MCNC: 12.7 G/DL (ref 13–17.7)
IMM GRANULOCYTES # BLD AUTO: 0.06 10*3/MM3 (ref 0–0.05)
IMM GRANULOCYTES NFR BLD AUTO: 0.5 % (ref 0–0.5)
LYMPHOCYTES # BLD AUTO: 1.54 10*3/MM3 (ref 0.7–3.1)
LYMPHOCYTES NFR BLD AUTO: 12.6 % (ref 19.6–45.3)
MCH RBC QN AUTO: 32.1 PG (ref 26.6–33)
MCHC RBC AUTO-ENTMCNC: 35.3 G/DL (ref 31.5–35.7)
MCV RBC AUTO: 90.9 FL (ref 79–97)
MONOCYTES # BLD AUTO: 1.03 10*3/MM3 (ref 0.1–0.9)
MONOCYTES NFR BLD AUTO: 8.4 % (ref 5–12)
NEUTROPHILS NFR BLD AUTO: 75.7 % (ref 42.7–76)
NEUTROPHILS NFR BLD AUTO: 9.23 10*3/MM3 (ref 1.7–7)
NRBC BLD AUTO-RTO: 0 /100 WBC (ref 0–0.2)
PLATELET # BLD AUTO: 212 10*3/MM3 (ref 140–450)
PMV BLD AUTO: 10.7 FL (ref 6–12)
RBC # BLD AUTO: 3.96 10*6/MM3 (ref 4.14–5.8)
WBC NRBC COR # BLD: 12.2 10*3/MM3 (ref 3.4–10.8)

## 2023-06-09 PROCEDURE — 99204 OFFICE O/P NEW MOD 45 MIN: CPT | Performed by: INTERNAL MEDICINE

## 2023-06-09 PROCEDURE — 1159F MED LIST DOCD IN RCRD: CPT | Performed by: INTERNAL MEDICINE

## 2023-06-09 PROCEDURE — 1160F RVW MEDS BY RX/DR IN RCRD: CPT | Performed by: INTERNAL MEDICINE

## 2023-06-09 PROCEDURE — 36415 COLL VENOUS BLD VENIPUNCTURE: CPT

## 2023-06-09 PROCEDURE — 1126F AMNT PAIN NOTED NONE PRSNT: CPT | Performed by: INTERNAL MEDICINE

## 2023-06-09 PROCEDURE — 85025 COMPLETE CBC W/AUTO DIFF WBC: CPT | Performed by: INTERNAL MEDICINE

## 2023-06-09 NOTE — LETTER
June 9, 2023     Holly Schultz MD  [address] [address]    Patient: Jasson Gonzalez   YOB: 1935   Date of Visit: 6/9/2023       Dear Dr. Antoine MD:    Thank you for referring Jasson Gonzalez to me for evaluation. Below are the relevant portions of my assessment and plan of care.    If you have questions, please do not hesitate to call me. I look forward to following Jasson along with you.         Sincerely,        Jose Roberto Álvarez MD        CC: No Recipients    Jose Roberto Álvarez MD  06/09/23 1518  Sign when Signing Visit  REFERRING PROVIDER:    Holly Schultz MD  [address] [place] [address]    REASON FOR CONSULTATION:    Mild leukocytosis with neutrophilia  Mild normocytic anemia    HISTORY OF PRESENT ILLNESS:  Jasson Gonzalez is a 88 y.o. male who is referred today for further evaluation of mild leukocytosis with neutrophilia and mild normocytic anemia.    Recent labs have indicated a white blood cell count of 11.58 with hemoglobin 12.7 on 10/14/2022.  Differential showed 81% neutrophils and 10% lymphocytes.  On 4/14/2023 the white blood cell count was very mildly elevated at 10.87 with a hemoglobin of 11.4.  Again, there was neutrophilia with 79% neutrophils and 11% lymphocytes.  Platelets have been normal.  The MCV has been normal.  He was referred for further evaluation.        He has some mild dementia.  His wife states that he drinks a lot of Pepsi's.  No tobacco use.  He denies any chronic pain issues.  He does have a decreased appetite and has lost some weight.  No fevers chills or other infectious symptoms.  No obvious inflammation.    Past Medical History:   Diagnosis Date   • Arthritis    • Detached retina    • Hyperlipidemia    • Hypertension    • Leukocytosis, unspecified type    • Permanent atrial fibrillation        Past Surgical History:   Procedure Laterality Date   • CATARACT EXTRACTION Bilateral    • EYE SURGERY     • REPLACEMENT TOTAL KNEE    "   both       SOCIAL HISTORY:   reports that he has never smoked. He has never used smokeless tobacco. He reports that he does not drink alcohol. No history on file for drug use.    FAMILY HISTORY:  family history includes Cancer in his mother.    ALLERGIES:  No Known Allergies    MEDICATIONS:  The medication list has been reviewed with the patient by the medical assistant, and the list has been updated in the electronic medical record, which I reviewed.  Medication dosages and frequencies were confirmed to be accurate.    Review of Systems   Constitutional:  Positive for appetite change and unexpected weight change.   Psychiatric/Behavioral:  Positive for confusion.    All other systems reviewed and are negative.    PHYSICAL EXAMINATION:  Vitals:    06/09/23 1016   BP: 142/74   Pulse: 71   Resp: 20   Temp: 98 °F (36.7 °C)   TempSrc: Temporal   SpO2: 98%   Weight: 62.5 kg (137 lb 11.2 oz)   Height: 177.8 cm (70\")   PainSc: 0-No pain       Physical Exam  Vitals and nursing note reviewed.   Constitutional:       General: He is not in acute distress.     Appearance: He is well-developed and underweight.   HENT:      Head: Normocephalic and atraumatic. Hair is normal.   Eyes:      General: Lids are normal.      Conjunctiva/sclera: Conjunctivae normal.      Pupils: Pupils are equal.   Neck:      Thyroid: No thyroid mass or thyromegaly.      Vascular: No JVD.   Cardiovascular:      Rate and Rhythm: Normal rate and regular rhythm.      Heart sounds: No murmur heard.    No friction rub. No gallop.   Pulmonary:      Effort: Pulmonary effort is normal. No respiratory distress.      Breath sounds: Normal breath sounds. No wheezing or rales.   Abdominal:      General: There is no distension.      Palpations: Abdomen is soft. There is no hepatomegaly, splenomegaly or mass.      Tenderness: There is no abdominal tenderness. There is no guarding.   Musculoskeletal:         General: No tenderness or deformity. Normal range of " motion.      Cervical back: Neck supple.   Lymphadenopathy:      Cervical: No cervical adenopathy.      Upper Body:      Right upper body: No supraclavicular adenopathy.      Left upper body: No supraclavicular adenopathy.   Skin:     General: Skin is warm and dry.      Findings: No rash.   Neurological:      Mental Status: He is alert and oriented to person, place, and time.   Psychiatric:         Behavior: Behavior normal.         Thought Content: Thought content normal.         Judgment: Judgment normal.       DIAGNOSTIC DATA:  CBC & Differential (06/09/2023 09:59)     IMAGING:    None reviewed    ASSESSMENT:  This is a 88 y.o. male with:    *Mild leukocytosis with neutrophilia  Recent labs have indicated a white blood cell count of 11.58 with hemoglobin 12.7 on 10/14/2022.  Differential showed 81% neutrophils and 10% lymphocytes.  On 4/14/2023 the white blood cell count was very mildly elevated at 10.87 with a hemoglobin of 11.4.  Again, there was neutrophilia with 79% neutrophils and 11% lymphocytes.  Platelets have been normal.  The MCV has been normal.  He was referred for further evaluation.      6/9/2023: Initial evaluation.  White blood cell count 12.2 with 76% neutrophils and 13% lymphocytes    *Mild normocytic anemia  6/9/2023: Hemoglobin 12.7, hematocrit 36.0%, MCV 90.9    *Mild dementia    PLAN:   His blood counts are just a little off.  I do not think they are anything to worry about at this time.  There is no evidence for leukemia or lymphoma.  Today the patient, his wife, and his daughter elected not to proceed with any further evaluation.  He can follow-up with primary care and see us back as needed.    ORDERS PLACED DURING THIS ENCOUNTER  Orders Placed This Encounter   Procedures   • CBC & Differential     Standing Status:   Future     Number of Occurrences:   1     Standing Expiration Date:   6/8/2024     Order Specific Question:   Manual Differential     Answer:   No     Order Specific Question:    Release to patient     Answer:   Routine Release

## 2023-08-21 DIAGNOSIS — I48.21 PERMANENT ATRIAL FIBRILLATION: ICD-10-CM

## 2023-08-21 RX ORDER — ATENOLOL 25 MG/1
TABLET ORAL
Qty: 90 TABLET | Refills: 1 | Status: SHIPPED | OUTPATIENT
Start: 2023-08-21

## 2023-09-02 DIAGNOSIS — F02.80 LATE ONSET ALZHEIMER'S DEMENTIA WITHOUT BEHAVIORAL DISTURBANCE: ICD-10-CM

## 2023-09-02 DIAGNOSIS — G30.1 LATE ONSET ALZHEIMER'S DEMENTIA WITHOUT BEHAVIORAL DISTURBANCE: ICD-10-CM

## 2023-09-05 RX ORDER — DONEPEZIL HYDROCHLORIDE 10 MG/1
TABLET, FILM COATED ORAL
Qty: 90 TABLET | Refills: 1 | Status: SHIPPED | OUTPATIENT
Start: 2023-09-05

## 2023-09-10 DIAGNOSIS — E78.5 HYPERLIPIDEMIA, UNSPECIFIED HYPERLIPIDEMIA TYPE: ICD-10-CM

## 2023-09-11 RX ORDER — ATORVASTATIN CALCIUM 20 MG/1
TABLET, FILM COATED ORAL
Qty: 90 TABLET | Refills: 1 | Status: SHIPPED | OUTPATIENT
Start: 2023-09-11

## 2023-10-11 DIAGNOSIS — D72.829 LEUKOCYTOSIS, UNSPECIFIED TYPE: ICD-10-CM

## 2023-10-11 DIAGNOSIS — E78.5 HYPERLIPIDEMIA, UNSPECIFIED HYPERLIPIDEMIA TYPE: ICD-10-CM

## 2023-10-11 DIAGNOSIS — I48.21 PERMANENT ATRIAL FIBRILLATION: ICD-10-CM

## 2023-10-11 DIAGNOSIS — I10 HYPERTENSION, ESSENTIAL: Primary | ICD-10-CM

## 2023-10-11 DIAGNOSIS — I63.511 CEREBROVASCULAR ACCIDENT (CVA) DUE TO OCCLUSION OF RIGHT MIDDLE CEREBRAL ARTERY: ICD-10-CM

## 2023-10-11 DIAGNOSIS — I10 HYPERTENSION, ESSENTIAL: ICD-10-CM

## 2023-10-11 RX ORDER — RAMIPRIL 10 MG/1
10 CAPSULE ORAL DAILY
Qty: 90 CAPSULE | Refills: 1 | Status: SHIPPED | OUTPATIENT
Start: 2023-10-11

## 2023-11-03 ENCOUNTER — OFFICE VISIT (OUTPATIENT)
Dept: FAMILY MEDICINE CLINIC | Facility: CLINIC | Age: 88
End: 2023-11-03
Payer: MEDICARE

## 2023-11-03 VITALS
BODY MASS INDEX: 19.79 KG/M2 | WEIGHT: 138.2 LBS | OXYGEN SATURATION: 98 % | HEART RATE: 54 BPM | HEIGHT: 70 IN | TEMPERATURE: 97.5 F

## 2023-11-03 DIAGNOSIS — I48.21 PERMANENT ATRIAL FIBRILLATION: ICD-10-CM
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
## 2023-11-03 DIAGNOSIS — R53.1 GENERALIZED WEAKNESS: ICD-10-CM

## 2023-11-03 DIAGNOSIS — Z00.00 MEDICARE ANNUAL WELLNESS VISIT, INITIAL: Primary | ICD-10-CM

## 2023-11-03 DIAGNOSIS — Z23 FLU VACCINE NEED: ICD-10-CM

## 2023-11-03 DIAGNOSIS — F02.80 LATE ONSET ALZHEIMER'S DEMENTIA WITHOUT BEHAVIORAL DISTURBANCE: ICD-10-CM

## 2023-11-03 DIAGNOSIS — G30.1 LATE ONSET ALZHEIMER'S DEMENTIA WITHOUT BEHAVIORAL DISTURBANCE: ICD-10-CM
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
## 2023-11-03 NOTE — PROGRESS NOTES
The ABCs of the Annual Wellness Visit  Subsequent Medicare Wellness Visit    Subjective      Jasson Gonzalez is a 88 y.o. male who presents for a Subsequent Medicare Wellness Visit.    Fell last night when going to the bathroom.     The following portions of the patient's history were reviewed and   updated as appropriate: allergies, current medications, past family history, past medical history, past social history, past surgical history, and problem list.    Compared to one year ago, the patient feels his physical   health is the same.    Compared to one year ago, the patient feels his mental   health is the same.    Recent Hospitalizations:  He was not admitted to the hospital during the last year.       Current Medical Providers:  Patient Care Team:  Holly Schultz MD as PCP - General (Family Medicine)  Jose Roberto Álvarez MD as Consulting Physician (Hematology and Oncology)  Jose Roberto Álvarez MD as Referring Physician (Hematology and Oncology)  Holly Schultz MD as Referring Physician (Family Medicine)    Outpatient Medications Prior to Visit   Medication Sig Dispense Refill    atenolol (TENORMIN) 25 MG tablet TAKE ONE TABLET BY MOUTH DAILY 90 tablet 1    atorvastatin (LIPITOR) 20 MG tablet TAKE ONE TABLET BY MOUTH DAILY 90 tablet 1    azelastine (ASTELIN) 0.1 % nasal spray 2 sprays into the nostril(s) as directed by provider 2 (Two) Times a Day. Use in each nostril as directed 1 each 12    donepezil (ARICEPT) 10 MG tablet TAKE ONE TABLET BY MOUTH ONCE NIGHTLY 90 tablet 1    montelukast (SINGULAIR) 10 MG tablet TAKE ONE TABLET BY MOUTH DAILY 90 tablet 3    multivitamin with minerals tablet tablet Take 1 tablet by mouth Daily.      ramipril (ALTACE) 10 MG capsule TAKE ONE CAPSULE BY MOUTH DAILY 90 capsule 1    apixaban (Eliquis) 5 MG tablet tablet Take 1 tablet by mouth Every 12 (Twelve) Hours. 60 tablet 11     No facility-administered medications prior to visit.       No opioid medication identified on active  "medication list. I have reviewed chart for other potential  high risk medication/s and harmful drug interactions in the elderly.        Aspirin is not on active medication list.  Aspirin use is not indicated based on review of current medical condition/s. Risk of harm outweighs potential benefits.  .    Patient Active Problem List   Diagnosis    Cellulitis of left upper extremity    Open wound    Permanent atrial fibrillation    Hyperlipidemia    Hypertension, essential    Late onset Alzheimer's dementia without behavioral disturbance    Cerebrovascular accident (CVA) due to occlusion of right middle cerebral artery    Nonexudative age-related macular degeneration    Pseudophakia    Secondary cataract of right eye    Vitreous degeneration    Generalized weakness    Fall at home     Advance Care Planning   Advance Care Planning     Advance Directive is not on file.  ACP discussion was held with the patient during this visit. Patient has an advance directive (not in EMR), copy requested.     Objective    Vitals:    11/03/23 1254   Pulse: 54   Temp: 97.5 °F (36.4 °C)   SpO2: 98%   Weight: 62.7 kg (138 lb 3.2 oz)   Height: 177.8 cm (70\")   PainSc: 0-No pain     Estimated body mass index is 19.83 kg/m² as calculated from the following:    Height as of this encounter: 177.8 cm (70\").    Weight as of this encounter: 62.7 kg (138 lb 3.2 oz).    BMI is within normal parameters. No other follow-up for BMI required.    Physical Exam  Vitals and nursing note reviewed.   Constitutional:       General: He is not in acute distress.     Appearance: He is well-developed.   HENT:      Head: Normocephalic.      Nose: Nose normal.   Cardiovascular:      Rate and Rhythm: Normal rate and regular rhythm.      Heart sounds: Normal heart sounds. No murmur heard.  Pulmonary:      Effort: Pulmonary effort is normal. No respiratory distress.      Breath sounds: Normal breath sounds.   Musculoskeletal:         General: Normal range of motion. "      Comments: Atrophy of the muscles in the legs   Skin:     General: Skin is warm and dry.      Findings: No rash.   Neurological:      Mental Status: He is alert and oriented to person, place, and time.   Psychiatric:         Behavior: Behavior normal.         Thought Content: Thought content normal.         Judgment: Judgment normal.      Comments: Memory poor, does not remember conversations from within the same office visit.           Does the patient have evidence of cognitive impairment?   Yes: He has already seen neurology, been started on Aricept and seems to be gradually worsening and becoming more sedentary.  We discussed this in more detail today    Lab Results   Component Value Date    CHLPL 99 10/25/2023    TRIG 96 10/25/2023    HDL 37 (L) 10/25/2023    LDL 43 10/25/2023    VLDL 19 10/25/2023          HEALTH RISK ASSESSMENT    Smoking Status:  Social History     Tobacco Use   Smoking Status Never   Smokeless Tobacco Never     Alcohol Consumption:  Social History     Substance and Sexual Activity   Alcohol Use No     Fall Risk Screen:    STEADI Fall Risk Assessment was completed, and patient is at MODERATE risk for falls. Assessment completed on:11/3/2023    Depression Screenin/3/2023    12:58 PM   PHQ-2/PHQ-9 Depression Screening   Little Interest or Pleasure in Doing Things 0-->not at all   Feeling Down, Depressed or Hopeless 0-->not at all   PHQ-9: Brief Depression Severity Measure Score 0       Health Habits and Functional and Cognitive Screenin/3/2023    12:56 PM   Functional & Cognitive Status   Do you have difficulty preparing food and eating? No   Do you have difficulty bathing yourself, getting dressed or grooming yourself? No   Do you have difficulty using the toilet? No   Do you have difficulty moving around from place to place? No   Do you have trouble with steps or getting out of a bed or a chair? Yes   Current Diet Well Balanced Diet   Dental Exam Up to date   Eye Exam  Up to date   Exercise (times per week) 0 times per week        Exercise Comment short walks up and down hallway   Do you need help using the phone?  No   Are you deaf or do you have serious difficulty hearing?  No   Do you need help to go to places out of walking distance? Yes   Do you need help preparing meals?  No   Do you need help with housework?  No   Do you need help with laundry? No   Do you need help taking your medications? Yes   Do you need help managing money? Yes   Do you ever drive or ride in a car without wearing a seat belt? No       Age-appropriate Screening Schedule:  Refer to the list below for future screening recommendations based on patient's age, sex and/or medical conditions. Orders for these recommended tests are listed in the plan section. The patient has been provided with a written plan.    Health Maintenance   Topic Date Due    Pneumococcal Vaccine 65+ (1 - PCV) Never done    COVID-19 Vaccine (4 - 2023-24 season) 09/01/2023    LIPID PANEL  10/25/2024    ANNUAL WELLNESS VISIT  11/03/2024    TDAP/TD VACCINES (2 - Tdap) 11/08/2030    INFLUENZA VACCINE  Completed    ZOSTER VACCINE  Completed                  CMS Preventative Services Quick Reference  Risk Factors Identified During Encounter:    Immunizations Discussed/Encouraged: Prevnar 20 (Pneumococcal 20-valent conjugate)  Inactivity/Sedentary:  Starting physical therapy again, trying to stand with assistance,   as he build strength to once again doing stand and sit exercises  Dental Screening Recommended  Vision Screening Recommended    The above risks/problems have been discussed with the patient.  Pertinent information has been shared with the patient in the After Visit Summary.    Diagnoses and all orders for this visit:    1. Medicare annual wellness visit, initial (Primary)    2. Flu vaccine need  -     Fluzone High-Dose 65+yrs (3100-4592)    3. Generalized weakness  -     Ambulatory Referral to Physical Therapy Evaluate and  treat    4. Late onset Alzheimer's dementia without behavioral disturbance  -     Ambulatory Referral to Physical Therapy Evaluate and treat    5. Permanent atrial fibrillation  -     apixaban (Eliquis) 5 MG tablet tablet; Take 1 tablet by mouth Every 12 (Twelve) Hours.  Dispense: 60 tablet; Refill: 11    6. Fall in home, subsequent encounter      Pleasant 88-year-old male here to follow-up for Medicare wellness visit, overall his health maintenance is up-to-date, he may need a pneumonia 20 vaccine but its possible that he got it at the pharmacy-they would.    He is a higher fall risk, he did fall yesterday but did not injure himself, normally he is grateful about this but has become gradually more sedentary over the past couple years.  Along with this his memory has been decreasing as well.  We discussed that it is imperative for him to rebuild his strength.  Particularly so that he can maintain his ADLs.  I am concerned that he may be at a place where he will not be able to be independent at home soon.  We will start with PT and his daughter will let me know if she thinks he needs to be seen sooner.  She is a pharmacist and good insight as to his abilities to stay safe at home.  We discussed that may be helpful to look into some long-term care facilities but not quite ready for placement yet.    Follow Up:   Next Medicare Wellness visit to be scheduled in 1 year.    Return in about 6 months (around 5/3/2024), or if symptoms worsen or fail to improve, for Recheck generalized weakness.    An After Visit Summary and PPPS were made available to the patient.    Holly Schultz MD

## 2023-12-29 ENCOUNTER — TELEPHONE (OUTPATIENT)
Dept: FAMILY MEDICINE CLINIC | Facility: CLINIC | Age: 88
End: 2023-12-29
Payer: MEDICARE

## 2023-12-29 NOTE — TELEPHONE ENCOUNTER
Name: Christiane Matthew    Relationship: Emergency Contact    Best Callback Number: [phone]    HUB PROVIDED THE RELAY MESSAGE FROM THE OFFICE   PATIENT VOICED UNDERSTANDING AND HAS NO FURTHER QUESTIONS AT THIS TIME    ADDITIONAL INFORMATION:PATIENT'S DAUGHTER STATED THAT THEY PREFER THEM TO BE MAILED.SHE STATED THAT IN THERE THERE WAS  A SELF ADDRESSED ENVELOPE FROM AN [place] ADDRESS AND IT NEEDS TO GO THERE WHICH IS PATIENT'S SON HOME ADDRESS.

## 2023-12-29 NOTE — TELEPHONE ENCOUNTER
EUGENE cueva to transfer patient to [facility]. Per clinical, FMLA forms are complete, and paid for. Need verification if FMLA forms should be mailed or faxed.

## 2024-01-02 RX ORDER — MONTELUKAST SODIUM 10 MG/1
TABLET ORAL
Qty: 90 TABLET | Refills: 3 | Status: SHIPPED | OUTPATIENT
Start: 2024-01-02 | End: 2024-04-01

## 2024-01-16 ENCOUNTER — TELEPHONE (OUTPATIENT)
Dept: FAMILY MEDICINE CLINIC | Facility: CLINIC | Age: 89
End: 2024-01-16

## 2024-03-07 DIAGNOSIS — G30.1 LATE ONSET ALZHEIMER'S DEMENTIA WITHOUT BEHAVIORAL DISTURBANCE: ICD-10-CM

## 2024-03-07 DIAGNOSIS — F02.80 LATE ONSET ALZHEIMER'S DEMENTIA WITHOUT BEHAVIORAL DISTURBANCE: ICD-10-CM

## 2024-03-07 RX ORDER — DONEPEZIL HYDROCHLORIDE 10 MG/1
TABLET, FILM COATED ORAL
Qty: 90 TABLET | Refills: 1 | Status: SHIPPED | OUTPATIENT
Start: 2024-03-07

## 2024-03-15 DIAGNOSIS — E78.5 HYPERLIPIDEMIA, UNSPECIFIED HYPERLIPIDEMIA TYPE: ICD-10-CM

## 2024-03-15 RX ORDER — ATORVASTATIN CALCIUM 20 MG/1
20 TABLET, FILM COATED ORAL DAILY
Qty: 90 TABLET | Refills: 1 | Status: SHIPPED | OUTPATIENT
Start: 2024-03-15

## 2024-04-20 DIAGNOSIS — I10 HYPERTENSION, ESSENTIAL: ICD-10-CM

## 2024-04-22 RX ORDER — RAMIPRIL 10 MG/1
10 CAPSULE ORAL DAILY
Qty: 90 CAPSULE | Refills: 1 | Status: SHIPPED | OUTPATIENT
Start: 2024-04-22

## 2024-04-22 NOTE — TELEPHONE ENCOUNTER
Rx Refill Note  Requested Prescriptions     Pending Prescriptions Disp Refills    ramipril (ALTACE) 10 MG capsule [Pharmacy Med Name: RAMIPRIL 10 MG CAPSULE] 90 capsule 1     Sig: TAKE 1 CAPSULE BY MOUTH DAILY      Last office visit with prescribing clinician: 11/3/2023   Last telemedicine visit with prescribing clinician: Visit date not found   Next office visit with prescribing clinician: 6/20/2024                         Would you like a call back once the refill request has been completed: [] Yes [] No    If the office needs to give you a call back, can they leave a voicemail: [] Yes [] No    Leora Dorsey MA  04/22/24, 07:46 EDT

## 2024-06-20 ENCOUNTER — OFFICE VISIT (OUTPATIENT)
Dept: FAMILY MEDICINE CLINIC | Facility: CLINIC | Age: 89
End: 2024-06-20
Payer: MEDICARE

## 2024-06-20 VITALS
OXYGEN SATURATION: 96 % | BODY MASS INDEX: 19.61 KG/M2 | DIASTOLIC BLOOD PRESSURE: 50 MMHG | SYSTOLIC BLOOD PRESSURE: 118 MMHG | HEART RATE: 52 BPM | WEIGHT: 137 LBS | HEIGHT: 70 IN | TEMPERATURE: 97.8 F

## 2024-06-20 DIAGNOSIS — G30.1 LATE ONSET ALZHEIMER'S DEMENTIA WITHOUT BEHAVIORAL DISTURBANCE: Primary | ICD-10-CM

## 2024-06-20 DIAGNOSIS — I48.21 PERMANENT ATRIAL FIBRILLATION: ICD-10-CM

## 2024-06-20 DIAGNOSIS — I10 HYPERTENSION, ESSENTIAL: ICD-10-CM

## 2024-06-20 DIAGNOSIS — E46 PROTEIN-CALORIE MALNUTRITION, UNSPECIFIED SEVERITY: ICD-10-CM

## 2024-06-20 DIAGNOSIS — F02.80 LATE ONSET ALZHEIMER'S DEMENTIA WITHOUT BEHAVIORAL DISTURBANCE: Primary | ICD-10-CM

## 2024-06-20 DIAGNOSIS — Z23 NEED FOR VACCINATION: ICD-10-CM

## 2024-06-20 DIAGNOSIS — R53.1 GENERALIZED WEAKNESS: ICD-10-CM

## 2024-06-20 DIAGNOSIS — Z91.09 ENVIRONMENTAL ALLERGIES: ICD-10-CM

## 2024-06-20 PROCEDURE — 1159F MED LIST DOCD IN RCRD: CPT | Performed by: FAMILY MEDICINE

## 2024-06-20 PROCEDURE — 1126F AMNT PAIN NOTED NONE PRSNT: CPT | Performed by: FAMILY MEDICINE

## 2024-06-20 PROCEDURE — G0009 ADMIN PNEUMOCOCCAL VACCINE: HCPCS | Performed by: FAMILY MEDICINE

## 2024-06-20 PROCEDURE — 99214 OFFICE O/P EST MOD 30 MIN: CPT | Performed by: FAMILY MEDICINE

## 2024-06-20 PROCEDURE — 90677 PCV20 VACCINE IM: CPT | Performed by: FAMILY MEDICINE

## 2024-06-20 PROCEDURE — 1160F RVW MEDS BY RX/DR IN RCRD: CPT | Performed by: FAMILY MEDICINE

## 2024-06-20 RX ORDER — MONTELUKAST SODIUM 10 MG/1
10 TABLET ORAL DAILY
Qty: 90 TABLET | Refills: 3 | Status: SHIPPED | OUTPATIENT
Start: 2024-06-20 | End: 2025-06-15

## 2024-06-20 RX ORDER — AZELASTINE 1 MG/ML
2 SPRAY, METERED NASAL 2 TIMES DAILY
Qty: 1 EACH | Refills: 12 | Status: SHIPPED | OUTPATIENT
Start: 2024-06-20

## 2024-06-20 NOTE — PROGRESS NOTES
"Chief Complaint  Extremity Weakness (Doing ok no complains ,stay same ) and Allergies (Would like refill on montelukast and azelastine if possible ) dementia    Subjective        Jasson Gonzalez presents to [facility]  History of Present Illness  Pleasant 89 YOM Here with his wife and daughter Tia.  For dementia that is progressing, currently on donepezil 10 mg daily without adverse effects, weakness that is stable but present, difficult for him to stand from a seated position, his wife was wondering if it would be safe for him to walk around the neighborhood but not realizing the degree of weakness that he has not already.  He thankfully does enjoy his meals and eats regularly, has a good appetite no depression no insomnia, no behavioral outburst.    Objective   Vital Signs:  /50   Pulse 52   Temp 97.8 °F (36.6 °C)   Ht 177.8 cm (70\")   Wt 62.1 kg (137 lb)   SpO2 96%   BMI 19.66 kg/m²   Estimated body mass index is 19.66 kg/m² as calculated from the following:    Height as of this encounter: 177.8 cm (70\").    Weight as of this encounter: 62.1 kg (137 lb).       BMI is within normal parameters. No other follow-up for BMI required.      Physical Exam  Vitals and nursing note reviewed.   Constitutional:       General: He is not in acute distress.     Appearance: He is well-developed.   HENT:      Head: Normocephalic.      Nose: Nose normal.   Cardiovascular:      Rate and Rhythm: Normal rate and regular rhythm.      Heart sounds: Normal heart sounds. No murmur heard.  Pulmonary:      Effort: Pulmonary effort is normal. No respiratory distress.      Breath sounds: Normal breath sounds.   Musculoskeletal:         General: Normal range of motion.   Skin:     General: Skin is warm and dry.      Findings: No rash.   Neurological:      Mental Status: He is alert and oriented to person, place, and time.   Psychiatric:         Behavior: Behavior normal.         Thought Content: " Thought content normal.         Judgment: Judgment normal.        Result Review :                     Assessment and Plan     Diagnoses and all orders for this visit:    1. Late onset Alzheimer's dementia without behavioral disturbance (Primary)    2. Need for vaccination  -     Pneumococcal Conjugate Vaccine 20-Valent (PCV20)    3. Environmental allergies  -     azelastine (ASTELIN) 0.1 % nasal spray; 2 sprays into the nostril(s) as directed by provider 2 (Two) Times a Day. Use in each nostril as directed  Dispense: 1 each; Refill: 12  -     montelukast (SINGULAIR) 10 MG tablet; Take 1 tablet by mouth Daily for 360 days.  Dispense: 90 tablet; Refill: 3    4. Hypertension, essential  -     Comprehensive Metabolic Panel; Future  -     CBC & Differential; Future  -     Lipid Panel; Future  -     TSH Rfx On Abnormal To Free T4; Future    5. Generalized weakness    6. Protein-calorie malnutrition, unspecified severity    7. Permanent atrial fibrillation    Pleasant 89-year-old male here for a few concerns.  From a dementia standpoint he does seem to be progressing slowly, no behavior changes/outbursts , he is remembering to eat his meals, but is generally getting weaker.    Weight stable    From a weakness standpoint he is able to ambulate with his rollator, shower dress himself.  But there is a gradual change.  We discussed considering home physical therapy which they do not think is necessary at the moment.  I told his wife and daughter they are welcome to call for a referral if they think he would be ready.    Blood pressure well-controlled, okay to continue atenolol and Eliquis for now although this may be medications we need to discontinue in the future depending on his fall risk.    Will do CMP, CBC, TSH and lipids with next appointment       Follow Up     Return in about 6 months (around 12/20/2024), or if symptoms worsen or fail to improve, for Medicare Wellness-with labs.  Patient was given instructions and  counseling regarding his condition or for health maintenance advice. Please see specific information pulled into the AVS if appropriate.

## 2024-07-09 DIAGNOSIS — F02.80 LATE ONSET ALZHEIMER'S DEMENTIA WITHOUT BEHAVIORAL DISTURBANCE: ICD-10-CM

## 2024-07-09 DIAGNOSIS — G30.1 LATE ONSET ALZHEIMER'S DEMENTIA WITHOUT BEHAVIORAL DISTURBANCE: ICD-10-CM

## 2024-07-09 RX ORDER — DONEPEZIL HYDROCHLORIDE 10 MG/1
TABLET, FILM COATED ORAL
Qty: 90 TABLET | Refills: 1 | Status: SHIPPED | OUTPATIENT
Start: 2024-07-09

## 2024-08-16 DIAGNOSIS — I48.21 PERMANENT ATRIAL FIBRILLATION: ICD-10-CM

## 2024-08-16 RX ORDER — ATENOLOL 25 MG/1
25 TABLET ORAL DAILY
Qty: 90 TABLET | Refills: 1 | Status: SHIPPED | OUTPATIENT
Start: 2024-08-16

## 2024-09-04 ENCOUNTER — TELEPHONE (OUTPATIENT)
Dept: FAMILY MEDICINE CLINIC | Facility: CLINIC | Age: 89
End: 2024-09-04
Payer: MEDICARE

## 2024-09-04 NOTE — TELEPHONE ENCOUNTER
PATIENT'S SON ALICIA CALLED IN REGARDS TO [facility] PAPERWORK.THAT WAS MAILED TO OFFICE ON 8/12/24     WANTS TO KNOW IF RECEIVED AND FILLED OUT.     PLEASE CALL [phone]

## 2024-09-05 NOTE — TELEPHONE ENCOUNTER
So far I have not receive the paperwork yet I left a voice  message on [place] phone to let him know

## 2024-09-06 NOTE — TELEPHONE ENCOUNTER
I called his son yesterday twice, I have not  received any [facility] papers ,would you mind to call him today and  let him know  ,thank you

## 2024-09-10 ENCOUNTER — TELEPHONE (OUTPATIENT)
Dept: FAMILY MEDICINE CLINIC | Facility: CLINIC | Age: 89
End: 2024-09-10
Payer: MEDICARE

## 2024-09-10 NOTE — TELEPHONE ENCOUNTER
HUB OKAY TO RELAY    Left VM to CB    [place] paperwork has been completed and is ready to be picked up. Form payment is $20. The office is open M-F 8:30AM-4PM

## 2024-09-19 DIAGNOSIS — E78.5 HYPERLIPIDEMIA, UNSPECIFIED HYPERLIPIDEMIA TYPE: ICD-10-CM

## 2024-09-19 RX ORDER — ATORVASTATIN CALCIUM 20 MG/1
20 TABLET, FILM COATED ORAL DAILY
Qty: 90 TABLET | Refills: 1 | Status: SHIPPED | OUTPATIENT
Start: 2024-09-19

## 2024-10-18 DIAGNOSIS — I10 HYPERTENSION, ESSENTIAL: ICD-10-CM

## 2024-10-18 RX ORDER — RAMIPRIL 10 MG/1
10 CAPSULE ORAL DAILY
Qty: 90 CAPSULE | Refills: 1 | Status: SHIPPED | OUTPATIENT
Start: 2024-10-18

## 2024-11-19 DIAGNOSIS — I48.21 PERMANENT ATRIAL FIBRILLATION: ICD-10-CM

## 2024-12-19 ENCOUNTER — OFFICE VISIT (OUTPATIENT)
Dept: FAMILY MEDICINE CLINIC | Facility: CLINIC | Age: 89
End: 2024-12-19
Payer: MEDICARE

## 2024-12-19 VITALS
BODY MASS INDEX: 19.33 KG/M2 | HEART RATE: 57 BPM | DIASTOLIC BLOOD PRESSURE: 58 MMHG | TEMPERATURE: 97.6 F | WEIGHT: 135 LBS | OXYGEN SATURATION: 97 % | SYSTOLIC BLOOD PRESSURE: 126 MMHG | HEIGHT: 70 IN

## 2024-12-19 DIAGNOSIS — Z23 FLU VACCINE NEED: ICD-10-CM

## 2024-12-19 DIAGNOSIS — R35.0 BENIGN PROSTATIC HYPERPLASIA WITH URINARY FREQUENCY: ICD-10-CM

## 2024-12-19 DIAGNOSIS — N40.1 BENIGN PROSTATIC HYPERPLASIA WITH URINARY FREQUENCY: ICD-10-CM

## 2024-12-19 DIAGNOSIS — Z00.00 MEDICARE ANNUAL WELLNESS VISIT, SUBSEQUENT: Primary | ICD-10-CM

## 2024-12-19 DIAGNOSIS — G30.1 LATE ONSET ALZHEIMER'S DEMENTIA WITHOUT BEHAVIORAL DISTURBANCE: ICD-10-CM

## 2024-12-19 DIAGNOSIS — F02.80 LATE ONSET ALZHEIMER'S DEMENTIA WITHOUT BEHAVIORAL DISTURBANCE: ICD-10-CM

## 2024-12-19 LAB
BILIRUB BLD-MCNC: NEGATIVE MG/DL
CLARITY, POC: CLEAR
COLOR UR: YELLOW
EXPIRATION DATE: ABNORMAL
GLUCOSE UR STRIP-MCNC: NEGATIVE MG/DL
KETONES UR QL: NEGATIVE
LEUKOCYTE EST, POC: NEGATIVE
Lab: 678
NITRITE UR-MCNC: NEGATIVE MG/ML
PH UR: 6 [PH] (ref 5–8)
PROT UR STRIP-MCNC: ABNORMAL MG/DL
RBC # UR STRIP: NEGATIVE /UL
SP GR UR: 1.02 (ref 1–1.03)
UROBILINOGEN UR QL: NORMAL

## 2024-12-19 RX ORDER — TAMSULOSIN HYDROCHLORIDE 0.4 MG/1
1 CAPSULE ORAL DAILY
Qty: 180 CAPSULE | Refills: 1 | Status: SHIPPED | OUTPATIENT
Start: 2024-12-19

## 2024-12-19 NOTE — PROGRESS NOTES
Subjective   The ABCs of the Annual Wellness Visit  Medicare Wellness Visit      Jsason Gonzalez is a 89 y.o. patient who presents for a Medicare Wellness Visit.    The following portions of the patient's history were reviewed and   updated as appropriate: allergies, current medications, past family history, past medical history, past social history, past surgical history, and problem list.    Compared to one year ago, the patient's physical   health is the same.  Compared to one year ago, the patient's mental   health is the same.    Recent Hospitalizations:  He was not admitted to the hospital during the last year.     Current Medical Providers:  Patient Care Team:  Holly Schultz MD as PCP - General (Family Medicine)  Jose Roberto Álvarez MD as Consulting Physician (Hematology and Oncology)  Jose Roberto Álvarez MD as Referring Physician (Hematology and Oncology)  Holly Schultz MD as Referring Physician (Family Medicine)    Outpatient Medications Prior to Visit   Medication Sig Dispense Refill    apixaban (Eliquis) 5 MG tablet tablet Take 1 tablet by mouth Every 12 (Twelve) Hours. 60 tablet 11    atenolol (TENORMIN) 25 MG tablet TAKE 1 TABLET BY MOUTH DAILY 90 tablet 1    atorvastatin (LIPITOR) 20 MG tablet TAKE 1 TABLET BY MOUTH DAILY 90 tablet 1    donepezil (ARICEPT) 10 MG tablet TAKE ONE TABLET BY MOUTH ONCE NIGHTLY 90 tablet 1    montelukast (SINGULAIR) 10 MG tablet Take 1 tablet by mouth Daily for 360 days. 90 tablet 3    multivitamin with minerals tablet tablet Take 1 tablet by mouth Daily.      ramipril (ALTACE) 10 MG capsule TAKE 1 CAPSULE BY MOUTH DAILY 90 capsule 1    azelastine (ASTELIN) 0.1 % nasal spray 2 sprays into the nostril(s) as directed by provider 2 (Two) Times a Day. Use in each nostril as directed (Patient not taking: Reported on 12/19/2024) 1 each 12     No facility-administered medications prior to visit.     No opioid medication identified on active medication list. I have reviewed chart for  "other potential  high risk medication/s and harmful drug interactions in the elderly.      Aspirin is not on active medication list.  Aspirin use is contraindicated for this patient due to: current use of Eliquis.  .    Patient Active Problem List   Diagnosis    Cellulitis of left upper extremity    Open wound    Permanent atrial fibrillation    Hyperlipidemia    Hypertension, essential    Late onset Alzheimer's dementia without behavioral disturbance    Cerebrovascular accident (CVA) due to occlusion of right middle cerebral artery    Nonexudative age-related macular degeneration    Pseudophakia    Secondary cataract of right eye    Vitreous degeneration    Generalized weakness    Fall at home     Advance Care Planning Advance Directive is not on file.  ACP discussion was held with the patient during this visit. Patient has an advance directive (not in EMR), copy requested.            Objective   Vitals:    12/19/24 1445   BP: 126/58   Pulse: 57   Temp: 97.6 °F (36.4 °C)   SpO2: 97%   Weight: 61.2 kg (135 lb)   Height: 177.8 cm (70\")   PainSc: 0-No pain       Estimated body mass index is 19.37 kg/m² as calculated from the following:    Height as of this encounter: 177.8 cm (70\").    Weight as of this encounter: 61.2 kg (135 lb).    BMI is within normal parameters. No other follow-up for BMI required.           Does the patient have evidence of cognitive impairment? Yes                                                                                                Health  Risk Assessment    Smoking Status:  Social History     Tobacco Use   Smoking Status Never   Smokeless Tobacco Never     Alcohol Consumption:  Social History     Substance and Sexual Activity   Alcohol Use No       Fall Risk Screen  STEADI Fall Risk Assessment was completed, and patient is at HIGH risk for falls. Assessment completed on:12/19/2024    Depression Screening   Little interest or pleasure in doing things? Not at all   Feeling down, " depressed, or hopeless? Not at all   PHQ-2 Total Score 0      Health Habits and Functional and Cognitive Screenin/19/2024     2:47 PM   Functional & Cognitive Status   Do you have difficulty preparing food and eating? No   Do you have difficulty bathing yourself, getting dressed or grooming yourself? No   Do you have difficulty using the toilet? No   Do you have difficulty moving around from place to place? Yes   Do you have trouble with steps or getting out of a bed or a chair? Yes   Current Diet Well Balanced Diet   Dental Exam Up to date   Eye Exam Up to date   Exercise (times per week) 2 times per week   Current Exercises Include Walking   Do you need help using the phone?  No   Are you deaf or do you have serious difficulty hearing?  No   Do you need help to go to places out of walking distance? Yes   Do you need help shopping? Yes   Do you need help preparing meals?  Yes   Do you need help with housework?  Yes   Do you need help with laundry? Yes   Do you need help taking your medications? No   Do you need help managing money? Yes   Do you ever drive or ride in a car without wearing a seat belt? No   Have you felt unusual stress, anger or loneliness in the last month? No   Who do you live with? Spouse   If you need help, do you have trouble finding someone available to you? No   Have you been bothered in the last four weeks by sexual problems? No   Do you have difficulty concentrating, remembering or making decisions? Yes           Age-appropriate Screening Schedule:  Refer to the list below for future screening recommendations based on patient's age, sex and/or medical conditions. Orders for these recommended tests are listed in the plan section. The patient has been provided with a written plan.    Health Maintenance List  Health Maintenance   Topic Date Due    RSV Vaccine - Adults (1 - 1-dose 75+ series) Never done    COVID-19 Vaccine (2024- season) 2024    LIPID PANEL  10/25/2024     "ANNUAL WELLNESS VISIT  12/19/2025    TDAP/TD VACCINES (2 - Tdap) 11/08/2030    INFLUENZA VACCINE  Completed    Pneumococcal Vaccine 65+  Completed    ZOSTER VACCINE  Completed                                                                                                                                                CMS Preventative Services Quick Reference  Risk Factors Identified During Encounter  Fall Risk-High or Moderate: Discussed Fall Prevention in the home  Dementia    The above risks/problems have been discussed with the patient.  Pertinent information has been shared with the patient in the After Visit Summary.  An After Visit Summary and PPPS were made available to the patient.    Follow Up:   Next Medicare Wellness visit to be scheduled in 1 year.         Additional E&M Note during same encounter follows:  Patient has additional, significant, and separately identifiable condition(s)/problem(s) that require work above and beyond the Medicare Wellness Visit     Chief Complaint  Medicare Wellness-subsequent (Pt has no complain )    Subjective   HPI  Jasson is also being seen today for an annual adult preventative physical exam.  and Jasson is also being seen today for additional medical problem/s.            Increased frequency of urination at home, no concerns for UTI.  Some nocturia, his daughter wonders if there could be a component of benign prostate hyperplasia.    Objective   Vital Signs:  /58   Pulse 57   Temp 97.6 °F (36.4 °C)   Ht 177.8 cm (70\")   Wt 61.2 kg (135 lb)   SpO2 97%   BMI 19.37 kg/m²   Physical Exam  Vitals and nursing note reviewed.   Constitutional:       General: He is not in acute distress.     Appearance: Normal appearance. He is well-developed.   HENT:      Head: Normocephalic.      Right Ear: Tympanic membrane and ear canal normal.      Left Ear: Tympanic membrane and ear canal normal.      Nose: Nose normal.   Cardiovascular:      Rate and Rhythm: Normal rate and " regular rhythm.      Heart sounds: Normal heart sounds. No murmur heard.  Pulmonary:      Effort: Pulmonary effort is normal. No respiratory distress.      Breath sounds: Normal breath sounds.   Abdominal:      General: Abdomen is flat. There is no distension.      Palpations: Abdomen is soft.      Tenderness: There is no abdominal tenderness.   Musculoskeletal:         General: Normal range of motion.   Skin:     General: Skin is warm and dry.      Findings: No rash.   Neurological:      Mental Status: He is alert and oriented to person, place, and time.   Psychiatric:         Mood and Affect: Mood normal.         Behavior: Behavior normal.         Thought Content: Thought content normal.         Judgment: Judgment normal.         The following data was reviewed by: Holly Schultz MD on 12/19/2024:      Office Visit on 12/19/2024   Component Date Value Ref Range Status    Color 12/19/2024 Yellow  Yellow, Straw, Dark Yellow, Alyssa Final    Clarity, UA 12/19/2024 Clear  Clear Final    Specific Gravity  12/19/2024 1.020  1.005 - 1.030 Final    pH, Urine 12/19/2024 6.0  5.0 - 8.0 Final    Leukocytes 12/19/2024 Negative  Negative Final    Nitrite, UA 12/19/2024 Negative  Negative Final    Protein, POC 12/19/2024 Trace (A)  Negative mg/dL Final    Glucose, UA 12/19/2024 Negative  Negative mg/dL Final    Ketones, UA 12/19/2024 Negative  Negative Final    Urobilinogen, UA 12/19/2024 Normal  Normal, 0.2 E.U./dL Final    Bilirubin 12/19/2024 Negative  Negative Final    Blood, UA 12/19/2024 Negative  Negative Final    Lot Number 12/19/2024 [id number]   Final    Expiration Date 12/19/2024 2/26   Final             Assessment and Plan            Medicare annual wellness visit, subsequent         Flu vaccine need    Orders:    Fluzone High-Dose 65+yrs ([id number])    Benign prostatic hyperplasia with urinary frequency    Orders:    tamsulosin (FLOMAX) 0.4 MG capsule 24 hr capsule; Take 1 capsule by mouth Daily.    POC Urinalysis  Dipstick, Automated  No evidence of UTI on urine, treat for BPH with Flomax, follow-up at next appointment.  Okay to discontinue if any hypotensive episodes.  Late onset Alzheimer's dementia without behavioral disturbance    Alzheimer type dementia stable, no behavior concerns, discussed reminding him to eat to making sure that he is being consistent with mealtimes.               Follow Up   Return in about 6 months (around 6/19/2025), or if symptoms worsen or fail to improve, for Recheck HTN with labs prior .  Patient was given instructions and counseling regarding his condition or for health maintenance advice. Please see specific information pulled into the AVS if appropriate.

## 2025-02-12 DIAGNOSIS — F02.80 LATE ONSET ALZHEIMER'S DEMENTIA WITHOUT BEHAVIORAL DISTURBANCE: ICD-10-CM

## 2025-02-12 DIAGNOSIS — I48.21 PERMANENT ATRIAL FIBRILLATION: ICD-10-CM

## 2025-02-12 DIAGNOSIS — G30.1 LATE ONSET ALZHEIMER'S DEMENTIA WITHOUT BEHAVIORAL DISTURBANCE: ICD-10-CM

## 2025-02-12 RX ORDER — ATENOLOL 25 MG/1
25 TABLET ORAL DAILY
Qty: 90 TABLET | Refills: 1 | Status: SHIPPED | OUTPATIENT
Start: 2025-02-12

## 2025-02-12 RX ORDER — DONEPEZIL HYDROCHLORIDE 10 MG/1
TABLET, FILM COATED ORAL
Qty: 90 TABLET | Refills: 1 | Status: SHIPPED | OUTPATIENT
Start: 2025-02-12

## 2025-03-20 DIAGNOSIS — E78.5 HYPERLIPIDEMIA, UNSPECIFIED HYPERLIPIDEMIA TYPE: ICD-10-CM

## 2025-03-20 RX ORDER — ATORVASTATIN CALCIUM 20 MG/1
20 TABLET, FILM COATED ORAL DAILY
Qty: 90 TABLET | Refills: 1 | Status: SHIPPED | OUTPATIENT
Start: 2025-03-20

## 2025-04-19 DIAGNOSIS — I10 HYPERTENSION, ESSENTIAL: ICD-10-CM

## 2025-04-21 RX ORDER — RAMIPRIL 10 MG/1
10 CAPSULE ORAL DAILY
Qty: 90 CAPSULE | Refills: 1 | Status: SHIPPED | OUTPATIENT
Start: 2025-04-21

## 2025-06-17 DIAGNOSIS — Z91.09 ENVIRONMENTAL ALLERGIES: ICD-10-CM

## 2025-06-17 RX ORDER — MONTELUKAST SODIUM 10 MG/1
10 TABLET ORAL DAILY
Qty: 90 TABLET | Refills: 3 | Status: SHIPPED | OUTPATIENT
Start: 2025-06-17

## 2025-06-19 ENCOUNTER — OFFICE VISIT (OUTPATIENT)
Dept: FAMILY MEDICINE CLINIC | Facility: CLINIC | Age: 89
End: 2025-06-19
Payer: MEDICARE

## 2025-06-19 VITALS
SYSTOLIC BLOOD PRESSURE: 130 MMHG | OXYGEN SATURATION: 97 % | DIASTOLIC BLOOD PRESSURE: 64 MMHG | WEIGHT: 133 LBS | HEIGHT: 70 IN | BODY MASS INDEX: 19.04 KG/M2 | HEART RATE: 66 BPM | TEMPERATURE: 97.6 F

## 2025-06-19 DIAGNOSIS — R53.1 GENERALIZED WEAKNESS: ICD-10-CM

## 2025-06-19 DIAGNOSIS — R26.2 DIFFICULTY WALKING: ICD-10-CM

## 2025-06-19 DIAGNOSIS — Z91.81 AT HIGH RISK FOR FALLS: ICD-10-CM

## 2025-06-19 DIAGNOSIS — G30.1 LATE ONSET ALZHEIMER'S DEMENTIA WITHOUT BEHAVIORAL DISTURBANCE: ICD-10-CM

## 2025-06-19 DIAGNOSIS — M62.81 MUSCLE WEAKNESS (GENERALIZED): ICD-10-CM

## 2025-06-19 DIAGNOSIS — R26.9 ABNORMALITY OF GAIT: ICD-10-CM

## 2025-06-19 DIAGNOSIS — I10 PRIMARY HYPERTENSION: Primary | ICD-10-CM

## 2025-06-19 DIAGNOSIS — F02.80 LATE ONSET ALZHEIMER'S DEMENTIA WITHOUT BEHAVIORAL DISTURBANCE: ICD-10-CM

## 2025-06-19 DIAGNOSIS — I48.21 PERMANENT ATRIAL FIBRILLATION: ICD-10-CM

## 2025-06-19 NOTE — PATIENT INSTRUCTIONS
Fall Prevention in the Home, Adult  Falls can cause injuries and affect people of all ages. There are many simple things that you can do to make your home safe and to help prevent falls.  If you need it, ask for help making these changes.  What actions can I take to prevent falls?  General information  Use good lighting in all rooms. Make sure to:  Replace any light bulbs that burn out.  Turn on lights if it is dark and use night-lights.  Keep items that you use often in easy-to-reach places. Lower the shelves around your home if needed.  Move furniture so that there are clear paths around it.  Do not keep throw rugs or other things on the floor that can make you trip.  If any of your floors are uneven, fix them.  Add color or contrast paint or tape to clearly jessie and help you see:  Grab bars or handrails.  First and last steps of staircases.  Where the edge of each step is.  If you use a ladder or stepladder:  Make sure that it is fully opened. Do not climb a closed ladder.  Make sure the sides of the ladder are locked in place.  Have someone hold the ladder while you use it.  Know where your pets are as you move through your home.  What can I do in the bathroom?         Keep the floor dry. Clean up any water that is on the floor right away.  Remove soap buildup in the bathtub or shower. Buildup makes bathtubs and showers slippery.  Use non-skid mats or decals on the floor of the bathtub or shower.  Attach bath mats securely with double-sided, non-slip rug tape.  If you need to sit down while you are in the shower, use a non-slip stool.  Install grab bars by the toilet and in the bathtub and shower. Do not use towel bars as grab bars.  What can I do in the bedroom?  Make sure that you have a light by your bed that is easy to reach.  Do not use any sheets or blankets on your bed that hang to the floor.  Have a firm bench or chair with side arms that you can use for support when you get dressed.  What can I do in  the kitchen?  Clean up any spills right away.  If you need to reach something above you, use a sturdy step stool that has a grab bar.  Keep electrical cables out of the way.  Do not use floor polish or wax that makes floors slippery.  What can I do with my stairs?  Do not leave anything on the stairs.  Make sure that you have a light switch at the top and the bottom of the stairs. Have them installed if you do not have them.  Make sure that there are handrails on both sides of the stairs. Fix handrails that are broken or loose. Make sure that handrails are as long as the staircases.  Install non-slip stair treads on all stairs in your home if they do not have carpet.  Avoid having throw rugs at the top or bottom of stairs, or secure the rugs with carpet tape to prevent them from moving.  Choose a carpet design that does not hide the edge of steps on the stairs. Make sure that carpet is firmly attached to the stairs. Fix any carpet that is loose or worn.  What can I do on the outside of my home?  Use bright outdoor lighting.  Repair the edges of walkways and driveways and fix any cracks. Clear paths of anything that can make you trip, such as tools or rocks.  Add color or contrast paint or tape to clearly jessie and help you see high doorway thresholds.  Trim any bushes or trees on the main path into your home.  Check that handrails are securely fastened and in good repair. Both sides of all steps should have handrails.  Install guardrails along the edges of any raised decks or porches.  Have leaves, snow, and ice cleared regularly. Use sand, salt, or ice melt on walkways during winter months if you live where there is ice and snow.  In the garage, clean up any spills right away, including grease or oil spills.  What other actions can I take?  Review your medicines with your health care provider. Some medicines can make you confused or feel dizzy. This can increase your chance of falling.  Wear closed-toe shoes that  fit well and support your feet. Wear shoes that have rubber soles and low heels.  Use a cane, walker, scooter, or crutches that help you move around if needed.  Talk with your provider about other ways that you can decrease your risk of falls. This may include seeing a physical therapist to learn to do exercises to improve movement and strength.  Where to find more information  Centers for Disease Control and Prevention, [place]: cdc.gov  National [place] on Aging: lili.nih.gov  National [place] on Aging: lili.nih.gov  Contact a health care provider if:  You are afraid of falling at home.  You feel weak, drowsy, or dizzy at home.  You fall at home.  Get help right away if you:  Lose consciousness or have trouble moving after a fall.  Have a fall that causes a head injury.  These symptoms may be an emergency. Get help right away. Call 911.  Do not wait to see if the symptoms will go away.  Do not drive yourself to the hospital.  This information is not intended to replace advice given to you by your health care provider. Make sure you discuss any questions you have with your health care provider.  Document Revised: 08/21/2023 Document Reviewed: 08/21/2023  Elsevier Patient Education © 2024 [employer]

## 2025-06-19 NOTE — PROGRESS NOTES
"Chief Complaint  Hypertension (Follow up ,no complains )    Subjective        Jasson Gonzalez presents to [facility]  Hypertension      History of Present Illness  The patient presents for follow-up of blood pressure and memory issues, accompanied by his wife.    Blood Pressure and Memory Issues  He reports no chest pain, shortness of breath, or palpitations.    Balance Issues and Falls  His wife notes balance issues leading to falls, especially at night when getting out of bed. He collides with objects but reports no head injuries. He is willing to consider home-based physical therapy. He has installed grab bars and uses a shower chair but typically stands during showers. His wife is concerned about his gait, which sometimes appears abnormal, raising concerns about potential stroke symptoms. He uses a walker more frequently and walks in the hallway for exercise. He has previously attended physical therapy.  - Onset: Balance issues noted by his wife.  - Location: Falls occur especially at night when getting out of bed.  - Character: Collides with objects but no head injuries; abnormal gait.  - Alleviating/Aggravating Factors: Installed grab bars, uses a shower chair, willing to consider home-based physical therapy, uses a walker more frequently.  - Timing: Falls occur especially at night.  - Severity: Concerns about potential stroke symptoms.    Sore Throat and Allergist Injections  He wakes up with a sore throat and receives injections from his allergist if needed. He was seeing Dr. Leslie.       Objective   Vital Signs:  /64   Pulse 66   Temp 97.6 °F (36.4 °C)   Ht 177.8 cm (70\")   Wt 60.3 kg (133 lb)   SpO2 97%   BMI 19.08 kg/m²   Estimated body mass index is 19.08 kg/m² as calculated from the following:    Height as of this encounter: 177.8 cm (70\").    Weight as of this encounter: 60.3 kg (133 lb).    BMI is within normal parameters. No other follow-up for BMI " required.      Physical Exam  Vitals and nursing note reviewed.   Constitutional:       General: He is not in acute distress.     Appearance: He is well-developed.   HENT:      Head: Normocephalic.      Nose: Nose normal.   Cardiovascular:      Rate and Rhythm: Normal rate and regular rhythm.      Heart sounds: Normal heart sounds. No murmur heard.  Pulmonary:      Effort: Pulmonary effort is normal. No respiratory distress.      Breath sounds: Normal breath sounds.   Musculoskeletal:         General: Normal range of motion.   Skin:     General: Skin is warm and dry.      Findings: No rash.   Neurological:      Mental Status: He is alert and oriented to person, place, and time.      Motor: Weakness present.      Coordination: Coordination abnormal.      Gait: Gait abnormal.      Comments: Unsteady gait, needs multiple attempts to stand from a seated position with assistance of his hands, walks with a widened stance, needs to hold onto other objects for stability, unable to stand up straight   Psychiatric:         Behavior: Behavior normal.         Thought Content: Thought content normal.         Judgment: Judgment normal.            Result Review :                  Assessment and Plan   Diagnoses and all orders for this visit:    1. Primary hypertension (Primary)  -     TSH Rfx On Abnormal To Free T4  -     Lipid Panel  -     CBC & Differential  -     Comprehensive Metabolic Panel    2. Generalized weakness    3. Late onset Alzheimer's dementia without behavioral disturbance  -     Ambulatory Referral to Home Health    4. Permanent atrial fibrillation  -     Ambulatory Referral to Home Health    5. At high risk for falls  -     Ambulatory Referral to Home Health    6. Difficulty walking  -     Ambulatory Referral to Home Health    7. Muscle weakness (generalized)  -     Ambulatory Referral to Home Health    8. Abnormality of gait  -     Ambulatory Referral to Home Health      Assessment & Plan  1.  Hypertension  well-controlled  - Blood pressure readings are normal  - Ordered comprehensive blood work panel to assess kidney function, liver function, cholesterol levels, and thyroid function    2.  Dementia with gradual worsening but no behavioral outburst, however he is becoming more unstable  - Experienced unbalanced episodes, especially at night  - Advised to use shower stool more frequently to prevent falls  - Initiated home health order for safety assessment and strength training  - Discuss discontinuing anticoagulant therapy due to balance issues with Tia  - Seek immediate medical attention if experiencing weakness, numbness, or tingling in one leg    3.  Atrial fibrillation, rate controlled with beta-blocker and on anticoagulation with Eliquis.  We discussed that my concern with his worsening balance is that he is at higher bleeding risk.  His has bled score is considered high at 3,, but this does not account for his balance and risk for fall.  It is challenging because his risk for an embolic stroke is high as well with A-fib.  He is 89 years old, so there could be complications that go either direction.  His daughter is a pharmacist so I offered to speak with her when she is available, I will reach out to see if there is a time that would work for her scheduled for us to chat about goals with the anticoagulation.    HAS-BLED SCORE   Total Risk Score (>5 Very High, 3-5 High, 2 Moderate, 1 Low): 3 (6/19/2025  5:04 PM)          Follow Up   Return in about 7 months (around 1/19/2026), or if symptoms worsen or fail to improve, for Medicare Wellness with fasting labs prior.  Patient was given instructions and counseling regarding his condition or for health maintenance advice. Please see specific information pulled into the AVS if appropriate.     Holly Schultz MD      Patient or patient representative verbalized consent for the use of Ambient Listening during the visit with  Holly Schultz MD for chart documentation.  6/19/2025  14:11 EDT

## 2025-06-19 NOTE — Clinical Note
Please reach out to patient's daughter Tia, I would like to talk to her about potentially stopping her dad's Eliquis but I am not completely sure about this.  When would be a good time when she would be available for us to talk about this?

## 2025-06-20 LAB
ALBUMIN SERPL-MCNC: 4 G/DL (ref 3.7–4.7)
ALP SERPL-CCNC: 98 IU/L (ref 44–121)
ALT SERPL-CCNC: 17 IU/L (ref 0–44)
AST SERPL-CCNC: 22 IU/L (ref 0–40)
BASOPHILS # BLD AUTO: 0.1 X10E3/UL (ref 0–0.2)
BASOPHILS NFR BLD AUTO: 0 %
BILIRUB SERPL-MCNC: 0.4 MG/DL (ref 0–1.2)
BUN SERPL-MCNC: 24 MG/DL (ref 8–27)
BUN/CREAT SERPL: 20 (ref 10–24)
CALCIUM SERPL-MCNC: 9.1 MG/DL (ref 8.6–10.2)
CHLORIDE SERPL-SCNC: 103 MMOL/L (ref 96–106)
CHOLEST SERPL-MCNC: 90 MG/DL (ref 100–199)
CO2 SERPL-SCNC: 21 MMOL/L (ref 20–29)
CREAT SERPL-MCNC: 1.22 MG/DL (ref 0.76–1.27)
EGFRCR SERPLBLD CKD-EPI 2021: 57 ML/MIN/1.73
EOSINOPHIL # BLD AUTO: 0.3 X10E3/UL (ref 0–0.4)
EOSINOPHIL NFR BLD AUTO: 2 %
ERYTHROCYTE [DISTWIDTH] IN BLOOD BY AUTOMATED COUNT: 13.9 % (ref 11.6–15.4)
GLOBULIN SER CALC-MCNC: 2.1 G/DL (ref 1.5–4.5)
GLUCOSE SERPL-MCNC: 103 MG/DL (ref 70–99)
HCT VFR BLD AUTO: 34 % (ref 37.5–51)
HDLC SERPL-MCNC: 35 MG/DL
HGB BLD-MCNC: 11 G/DL (ref 13–17.7)
IMM GRANULOCYTES # BLD AUTO: 0 X10E3/UL (ref 0–0.1)
IMM GRANULOCYTES NFR BLD AUTO: 0 %
LDLC SERPL CALC-MCNC: 36 MG/DL (ref 0–99)
LYMPHOCYTES # BLD AUTO: 1 X10E3/UL (ref 0.7–3.1)
LYMPHOCYTES NFR BLD AUTO: 9 %
MCH RBC QN AUTO: 30.6 PG (ref 26.6–33)
MCHC RBC AUTO-ENTMCNC: 32.4 G/DL (ref 31.5–35.7)
MCV RBC AUTO: 94 FL (ref 79–97)
MONOCYTES # BLD AUTO: 1 X10E3/UL (ref 0.1–0.9)
MONOCYTES NFR BLD AUTO: 8 %
NEUTROPHILS # BLD AUTO: 9.9 X10E3/UL (ref 1.4–7)
NEUTROPHILS NFR BLD AUTO: 81 %
PLATELET # BLD AUTO: 227 X10E3/UL (ref 150–450)
POTASSIUM SERPL-SCNC: 5 MMOL/L (ref 3.5–5.2)
PROT SERPL-MCNC: 6.1 G/DL (ref 6–8.5)
RBC # BLD AUTO: 3.6 X10E6/UL (ref 4.14–5.8)
SODIUM SERPL-SCNC: 139 MMOL/L (ref 134–144)
TRIGL SERPL-MCNC: 101 MG/DL (ref 0–149)
TSH SERPL DL<=0.005 MIU/L-ACNC: 2.8 UIU/ML (ref 0.45–4.5)
VLDLC SERPL CALC-MCNC: 19 MG/DL (ref 5–40)
WBC # BLD AUTO: 12.3 X10E3/UL (ref 3.4–10.8)

## 2025-06-20 NOTE — PROGRESS NOTES
Tia Wilkinson has to work today until 8:45 pm but she has availability on Monday from 8:00-11:30 am.

## 2025-08-17 DIAGNOSIS — I48.21 PERMANENT ATRIAL FIBRILLATION: ICD-10-CM

## 2025-08-17 DIAGNOSIS — G30.1 LATE ONSET ALZHEIMER'S DEMENTIA WITHOUT BEHAVIORAL DISTURBANCE: ICD-10-CM

## 2025-08-17 DIAGNOSIS — F02.80 LATE ONSET ALZHEIMER'S DEMENTIA WITHOUT BEHAVIORAL DISTURBANCE: ICD-10-CM

## 2025-08-18 RX ORDER — DONEPEZIL HYDROCHLORIDE 10 MG/1
10 TABLET, FILM COATED ORAL NIGHTLY
Qty: 90 TABLET | Refills: 1 | Status: ON HOLD | OUTPATIENT
Start: 2025-08-18

## 2025-08-18 RX ORDER — ATENOLOL 25 MG/1
25 TABLET ORAL DAILY
Qty: 90 TABLET | Refills: 1 | Status: ON HOLD | OUTPATIENT
Start: 2025-08-18

## 2025-08-24 ENCOUNTER — APPOINTMENT (OUTPATIENT)
Dept: GENERAL RADIOLOGY | Facility: HOSPITAL | Age: 89
End: 2025-08-24
Payer: MEDICARE

## 2025-08-24 ENCOUNTER — HOSPITAL ENCOUNTER (EMERGENCY)
Facility: HOSPITAL | Age: 89
Discharge: HOME | End: 2025-08-24
Attending: STUDENT IN AN ORGANIZED HEALTH CARE EDUCATION/TRAINING PROGRAM | Admitting: STUDENT IN AN ORGANIZED HEALTH CARE EDUCATION/TRAINING PROGRAM
Payer: MEDICARE

## 2025-08-24 ENCOUNTER — APPOINTMENT (OUTPATIENT)
Dept: CT IMAGING | Facility: HOSPITAL | Age: 89
End: 2025-08-24
Payer: MEDICARE

## 2025-08-24 VITALS
SYSTOLIC BLOOD PRESSURE: 120 MMHG | HEART RATE: 92 BPM | DIASTOLIC BLOOD PRESSURE: 75 MMHG | RESPIRATION RATE: 16 BRPM | OXYGEN SATURATION: 98 % | TEMPERATURE: 97.6 F

## 2025-08-24 DIAGNOSIS — S09.90XA ACUTE HEAD INJURY, INITIAL ENCOUNTER: ICD-10-CM

## 2025-08-24 DIAGNOSIS — S42.202A CLOSED FRACTURE OF PROXIMAL END OF LEFT HUMERUS, UNSPECIFIED FRACTURE MORPHOLOGY, INITIAL ENCOUNTER: Primary | ICD-10-CM

## 2025-08-24 PROCEDURE — 70450 CT HEAD/BRAIN W/O DYE: CPT

## 2025-08-24 PROCEDURE — 73030 X-RAY EXAM OF SHOULDER: CPT

## 2025-08-24 PROCEDURE — 72125 CT NECK SPINE W/O DYE: CPT

## 2025-08-24 PROCEDURE — 99284 EMERGENCY DEPT VISIT MOD MDM: CPT

## 2025-08-25 PROBLEM — N17.9 ACUTE KIDNEY INJURY: Status: ACTIVE | Noted: 2025-08-25

## 2025-08-26 PROBLEM — D64.9 ANEMIA: Status: ACTIVE | Noted: 2025-08-26

## 2025-08-28 PROBLEM — E43 SEVERE PROTEIN-CALORIE MALNUTRITION: Status: ACTIVE | Noted: 2025-08-28

## 2025-09-01 PROBLEM — S42.202A CLOSED FRACTURE OF LEFT PROXIMAL HUMERUS: Status: ACTIVE | Noted: 2025-09-01
